# Patient Record
Sex: FEMALE | Race: WHITE | NOT HISPANIC OR LATINO | Employment: UNEMPLOYED | URBAN - METROPOLITAN AREA
[De-identification: names, ages, dates, MRNs, and addresses within clinical notes are randomized per-mention and may not be internally consistent; named-entity substitution may affect disease eponyms.]

---

## 2017-04-11 DIAGNOSIS — Z01.812 PRE-OPERATIVE LABORATORY EXAMINATION: ICD-10-CM

## 2017-04-11 LAB
ANION GAP SERPL CALC-SCNC: 9 MMOL/L (ref 0–11.9)
BASOPHILS # BLD AUTO: 0.4 % (ref 0–1.8)
BASOPHILS # BLD: 0.03 K/UL (ref 0–0.12)
BUN SERPL-MCNC: 14 MG/DL (ref 8–22)
CALCIUM SERPL-MCNC: 9.9 MG/DL (ref 8.5–10.5)
CHLORIDE SERPL-SCNC: 103 MMOL/L (ref 96–112)
CO2 SERPL-SCNC: 24 MMOL/L (ref 20–33)
CREAT SERPL-MCNC: 0.6 MG/DL (ref 0.5–1.4)
EOSINOPHIL # BLD AUTO: 0.1 K/UL (ref 0–0.51)
EOSINOPHIL NFR BLD: 1.2 % (ref 0–6.9)
ERYTHROCYTE [DISTWIDTH] IN BLOOD BY AUTOMATED COUNT: 39 FL (ref 35.9–50)
GFR SERPL CREATININE-BSD FRML MDRD: >60 ML/MIN/1.73 M 2
GLUCOSE SERPL-MCNC: 91 MG/DL (ref 65–99)
HCG SERPL QL: NEGATIVE
HCT VFR BLD AUTO: 45.3 % (ref 37–47)
HGB BLD-MCNC: 15.5 G/DL (ref 12–16)
IMM GRANULOCYTES # BLD AUTO: 0.02 K/UL (ref 0–0.11)
IMM GRANULOCYTES NFR BLD AUTO: 0.2 % (ref 0–0.9)
LYMPHOCYTES # BLD AUTO: 1.9 K/UL (ref 1–4.8)
LYMPHOCYTES NFR BLD: 22.9 % (ref 22–41)
MCH RBC QN AUTO: 29 PG (ref 27–33)
MCHC RBC AUTO-ENTMCNC: 34.2 G/DL (ref 33.6–35)
MCV RBC AUTO: 84.7 FL (ref 81.4–97.8)
MONOCYTES # BLD AUTO: 0.42 K/UL (ref 0–0.85)
MONOCYTES NFR BLD AUTO: 5.1 % (ref 0–13.4)
NEUTROPHILS # BLD AUTO: 5.82 K/UL (ref 2–7.15)
NEUTROPHILS NFR BLD: 70.2 % (ref 44–72)
NRBC # BLD AUTO: 0 K/UL
NRBC BLD AUTO-RTO: 0 /100 WBC
PLATELET # BLD AUTO: 284 K/UL (ref 164–446)
PMV BLD AUTO: 10.4 FL (ref 9–12.9)
POTASSIUM SERPL-SCNC: 3.9 MMOL/L (ref 3.6–5.5)
RBC # BLD AUTO: 5.35 M/UL (ref 4.2–5.4)
SODIUM SERPL-SCNC: 136 MMOL/L (ref 135–145)
WBC # BLD AUTO: 8.3 K/UL (ref 4.8–10.8)

## 2017-04-11 PROCEDURE — 80048 BASIC METABOLIC PNL TOTAL CA: CPT

## 2017-04-11 PROCEDURE — 36415 COLL VENOUS BLD VENIPUNCTURE: CPT

## 2017-04-11 PROCEDURE — 84703 CHORIONIC GONADOTROPIN ASSAY: CPT

## 2017-04-11 PROCEDURE — 85025 COMPLETE CBC W/AUTO DIFF WBC: CPT

## 2017-04-11 RX ORDER — IBUPROFEN 200 MG
600 TABLET ORAL EVERY 6 HOURS PRN
COMMUNITY
End: 2018-12-21

## 2017-04-11 RX ORDER — ACETAMINOPHEN 500 MG
1500 TABLET ORAL 3 TIMES DAILY PRN
COMMUNITY
End: 2018-12-21
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
## 2017-04-21 ENCOUNTER — HOSPITAL ENCOUNTER (OUTPATIENT)
Facility: MEDICAL CENTER | Age: 27
End: 2017-04-22
Attending: OBSTETRICS & GYNECOLOGY | Admitting: OBSTETRICS & GYNECOLOGY
Payer: MEDICAID

## 2017-04-21 PROBLEM — R10.2 ADNEXAL TENDERNESS, RIGHT: Status: ACTIVE | Noted: 2017-04-21

## 2017-04-21 LAB — HCG SERPL QL: NEGATIVE

## 2017-04-21 PROCEDURE — 501838 HCHG SUTURE GENERAL: Performed by: OBSTETRICS & GYNECOLOGY

## 2017-04-21 PROCEDURE — G0378 HOSPITAL OBSERVATION PER HR: HCPCS

## 2017-04-21 PROCEDURE — 700101 HCHG RX REV CODE 250

## 2017-04-21 PROCEDURE — 500888 HCHG PACK, MINOR BASIN: Performed by: OBSTETRICS & GYNECOLOGY

## 2017-04-21 PROCEDURE — 500854 HCHG NEEDLE, INSUFFLATION FOR STEP: Performed by: OBSTETRICS & GYNECOLOGY

## 2017-04-21 PROCEDURE — 502704 HCHG DEVICE, LIGASURE IMPACT: Performed by: OBSTETRICS & GYNECOLOGY

## 2017-04-21 PROCEDURE — 501579 HCHG TROCAR, STEP 5MM: Performed by: OBSTETRICS & GYNECOLOGY

## 2017-04-21 PROCEDURE — 700111 HCHG RX REV CODE 636 W/ 250 OVERRIDE (IP): Performed by: OBSTETRICS & GYNECOLOGY

## 2017-04-21 PROCEDURE — 160009 HCHG ANES TIME/MIN: Performed by: OBSTETRICS & GYNECOLOGY

## 2017-04-21 PROCEDURE — 110382 HCHG SHELL REV 271: Performed by: OBSTETRICS & GYNECOLOGY

## 2017-04-21 PROCEDURE — 160002 HCHG RECOVERY MINUTES (STAT): Performed by: OBSTETRICS & GYNECOLOGY

## 2017-04-21 PROCEDURE — 502647 HCHG SEALANT-FIBRIN EVICEL 5 ML: Performed by: OBSTETRICS & GYNECOLOGY

## 2017-04-21 PROCEDURE — 160029 HCHG SURGERY MINUTES - 1ST 30 MINS LEVEL 4: Performed by: OBSTETRICS & GYNECOLOGY

## 2017-04-21 PROCEDURE — 501330 HCHG SET, CYSTO IRRIG TUBING: Performed by: OBSTETRICS & GYNECOLOGY

## 2017-04-21 PROCEDURE — 160036 HCHG PACU - EA ADDL 30 MINS PHASE I: Performed by: OBSTETRICS & GYNECOLOGY

## 2017-04-21 PROCEDURE — 700102 HCHG RX REV CODE 250 W/ 637 OVERRIDE(OP)

## 2017-04-21 PROCEDURE — 160035 HCHG PACU - 1ST 60 MINS PHASE I: Performed by: OBSTETRICS & GYNECOLOGY

## 2017-04-21 PROCEDURE — 700102 HCHG RX REV CODE 250 W/ 637 OVERRIDE(OP): Performed by: OBSTETRICS & GYNECOLOGY

## 2017-04-21 PROCEDURE — 502648 HCHG APPLICATOR, EVICEL: Performed by: OBSTETRICS & GYNECOLOGY

## 2017-04-21 PROCEDURE — 502240 HCHG MISC OR SUPPLY RC 0272: Performed by: OBSTETRICS & GYNECOLOGY

## 2017-04-21 PROCEDURE — A4338 INDWELLING CATHETER LATEX: HCPCS | Performed by: OBSTETRICS & GYNECOLOGY

## 2017-04-21 PROCEDURE — 84703 CHORIONIC GONADOTROPIN ASSAY: CPT

## 2017-04-21 PROCEDURE — 700111 HCHG RX REV CODE 636 W/ 250 OVERRIDE (IP)

## 2017-04-21 PROCEDURE — 160048 HCHG OR STATISTICAL LEVEL 1-5: Performed by: OBSTETRICS & GYNECOLOGY

## 2017-04-21 PROCEDURE — 501688 HCHG UTERINE MANIPULATOR RUMI: Performed by: OBSTETRICS & GYNECOLOGY

## 2017-04-21 PROCEDURE — 88307 TISSUE EXAM BY PATHOLOGIST: CPT

## 2017-04-21 PROCEDURE — 500886 HCHG PACK, LAPAROSCOPY: Performed by: OBSTETRICS & GYNECOLOGY

## 2017-04-21 PROCEDURE — 160041 HCHG SURGERY MINUTES - EA ADDL 1 MIN LEVEL 4: Performed by: OBSTETRICS & GYNECOLOGY

## 2017-04-21 PROCEDURE — 501577 HCHG TROCAR, STEP 11MM: Performed by: OBSTETRICS & GYNECOLOGY

## 2017-04-21 PROCEDURE — A4606 OXYGEN PROBE USED W OXIMETER: HCPCS | Performed by: OBSTETRICS & GYNECOLOGY

## 2017-04-21 PROCEDURE — 501411 HCHG SPONGE, BABY LAP W/O RINGS: Performed by: OBSTETRICS & GYNECOLOGY

## 2017-04-21 PROCEDURE — A9270 NON-COVERED ITEM OR SERVICE: HCPCS

## 2017-04-21 PROCEDURE — A9270 NON-COVERED ITEM OR SERVICE: HCPCS | Performed by: OBSTETRICS & GYNECOLOGY

## 2017-04-21 PROCEDURE — 110371 HCHG SHELL REV 272: Performed by: OBSTETRICS & GYNECOLOGY

## 2017-04-21 RX ORDER — ONDANSETRON 2 MG/ML
INJECTION INTRAMUSCULAR; INTRAVENOUS
Status: COMPLETED
Start: 2017-04-21 | End: 2017-04-21

## 2017-04-21 RX ORDER — SIMETHICONE 80 MG
80 TABLET,CHEWABLE ORAL EVERY 8 HOURS PRN
Status: DISCONTINUED | OUTPATIENT
Start: 2017-04-21 | End: 2017-04-22 | Stop reason: HOSPADM

## 2017-04-21 RX ORDER — MIDAZOLAM HYDROCHLORIDE 1 MG/ML
INJECTION INTRAMUSCULAR; INTRAVENOUS
Status: COMPLETED
Start: 2017-04-21 | End: 2017-04-21

## 2017-04-21 RX ORDER — OXYCODONE HYDROCHLORIDE 5 MG/1
2.5 TABLET ORAL
Status: DISCONTINUED | OUTPATIENT
Start: 2017-04-21 | End: 2017-04-22 | Stop reason: HOSPADM

## 2017-04-21 RX ORDER — MORPHINE SULFATE 4 MG/ML
2 INJECTION, SOLUTION INTRAMUSCULAR; INTRAVENOUS
Status: DISCONTINUED | OUTPATIENT
Start: 2017-04-21 | End: 2017-04-22 | Stop reason: HOSPADM

## 2017-04-21 RX ORDER — ACETAMINOPHEN 650 MG/1
975 SUPPOSITORY RECTAL EVERY 6 HOURS
Status: DISCONTINUED | OUTPATIENT
Start: 2017-04-21 | End: 2017-04-21

## 2017-04-21 RX ORDER — BUPIVACAINE HYDROCHLORIDE AND EPINEPHRINE 2.5; 5 MG/ML; UG/ML
INJECTION, SOLUTION EPIDURAL; INFILTRATION; INTRACAUDAL; PERINEURAL
Status: DISCONTINUED | OUTPATIENT
Start: 2017-04-21 | End: 2017-04-21 | Stop reason: HOSPADM

## 2017-04-21 RX ORDER — OXYCODONE HYDROCHLORIDE 5 MG/1
5 TABLET ORAL
Status: DISCONTINUED | OUTPATIENT
Start: 2017-04-21 | End: 2017-04-22 | Stop reason: HOSPADM

## 2017-04-21 RX ORDER — LIDOCAINE HYDROCHLORIDE 10 MG/ML
INJECTION, SOLUTION INFILTRATION; PERINEURAL
Status: DISPENSED
Start: 2017-04-21 | End: 2017-04-21

## 2017-04-21 RX ORDER — SODIUM CHLORIDE, SODIUM LACTATE, POTASSIUM CHLORIDE, CALCIUM CHLORIDE 600; 310; 30; 20 MG/100ML; MG/100ML; MG/100ML; MG/100ML
1000 INJECTION, SOLUTION INTRAVENOUS ONCE
Status: COMPLETED | OUTPATIENT
Start: 2017-04-21 | End: 2017-04-21

## 2017-04-21 RX ORDER — ONDANSETRON 2 MG/ML
4 INJECTION INTRAMUSCULAR; INTRAVENOUS EVERY 6 HOURS PRN
Status: DISCONTINUED | OUTPATIENT
Start: 2017-04-21 | End: 2017-04-22 | Stop reason: HOSPADM

## 2017-04-21 RX ORDER — OXYCODONE HCL 5 MG/5 ML
SOLUTION, ORAL ORAL
Status: COMPLETED
Start: 2017-04-21 | End: 2017-04-21

## 2017-04-21 RX ORDER — BUPIVACAINE HYDROCHLORIDE AND EPINEPHRINE 2.5; 5 MG/ML; UG/ML
INJECTION, SOLUTION EPIDURAL; INFILTRATION; INTRACAUDAL; PERINEURAL
Status: DISPENSED
Start: 2017-04-21 | End: 2017-04-21

## 2017-04-21 RX ORDER — OXYCODONE AND ACETAMINOPHEN 7.5; 325 MG/1; MG/1
1 TABLET ORAL EVERY 4 HOURS PRN
Status: DISCONTINUED | OUTPATIENT
Start: 2017-04-21 | End: 2017-04-22 | Stop reason: HOSPADM

## 2017-04-21 RX ADMIN — FENTANYL CITRATE 25 MCG: 50 INJECTION, SOLUTION INTRAMUSCULAR; INTRAVENOUS at 14:20

## 2017-04-21 RX ADMIN — MIDAZOLAM 1 MG: 1 INJECTION INTRAMUSCULAR; INTRAVENOUS at 13:00

## 2017-04-21 RX ADMIN — ONDANSETRON 4 MG: 2 INJECTION, SOLUTION INTRAMUSCULAR; INTRAVENOUS at 21:18

## 2017-04-21 RX ADMIN — FENTANYL CITRATE 25 MCG: 50 INJECTION, SOLUTION INTRAMUSCULAR; INTRAVENOUS at 13:31

## 2017-04-21 RX ADMIN — SODIUM CHLORIDE, SODIUM LACTATE, POTASSIUM CHLORIDE, CALCIUM CHLORIDE 1000 ML: 600; 310; 30; 20 INJECTION, SOLUTION INTRAVENOUS at 09:15

## 2017-04-21 RX ADMIN — OXYCODONE HYDROCHLORIDE 10 MG: 5 SOLUTION ORAL at 13:32

## 2017-04-21 RX ADMIN — MORPHINE SULFATE 2 MG: 4 INJECTION INTRAVENOUS at 21:17

## 2017-04-21 RX ADMIN — FENTANYL CITRATE 50 MCG: 50 INJECTION, SOLUTION INTRAMUSCULAR; INTRAVENOUS at 14:50

## 2017-04-21 RX ADMIN — FENTANYL CITRATE 25 MCG: 50 INJECTION, SOLUTION INTRAMUSCULAR; INTRAVENOUS at 15:48

## 2017-04-21 RX ADMIN — ONDANSETRON 4 MG: 2 INJECTION, SOLUTION INTRAMUSCULAR; INTRAVENOUS at 12:50

## 2017-04-21 RX ADMIN — MORPHINE SULFATE 2 MG: 4 INJECTION INTRAVENOUS at 17:01

## 2017-04-21 RX ADMIN — MIDAZOLAM 1 MG: 1 INJECTION INTRAMUSCULAR; INTRAVENOUS at 14:50

## 2017-04-21 ASSESSMENT — PAIN SCALES - GENERAL
PAINLEVEL_OUTOF10: 8
PAINLEVEL_OUTOF10: 6
PAINLEVEL_OUTOF10: 7
PAINLEVEL_OUTOF10: 0
PAINLEVEL_OUTOF10: 6
PAINLEVEL_OUTOF10: 8
PAINLEVEL_OUTOF10: 7
PAINLEVEL_OUTOF10: 5

## 2017-04-21 ASSESSMENT — LIFESTYLE VARIABLES
ALCOHOL_USE: NO
EVER_SMOKED: NEVER
DO YOU DRINK ALCOHOL: NO

## 2017-04-21 NOTE — OR NURSING
1310 received report from Vahid WOLF, plan of care discussed. Pt sleeping, VS stable  1331 pt stating abdomen and throat hurting. Pt medicated for pain - see MAR   1350  at bedside.  1400 handoff to Vahid WOLF

## 2017-04-21 NOTE — PROGRESS NOTES
1400-report from Stefany WOLF, reassumed care of pt.  Pt drowsy, awakens to voice, rates pain 7/10, back to sleep.  1420-pt rates pain 8/10, medicated with fentanyl IV.   1425-pt nauseated 2nd time.  Emesis bag within reach. HOB elevated.  Dr Larry updated regarding pts status, admission order received.  1450-pt moaning, restless, anxious, states she has pain. Medicated with fentanyl IV, midazolam IV  1520-pt sleeping soundly with  at side, no distress noted.  1540-room assignment received, report to Lyn WOLF.     1601-transport at side to take pt to room. , belongings up to room with pt

## 2017-04-21 NOTE — PROGRESS NOTES
1221-received pt from OR with report from Dr Valentin. VSS. Oral airway in place, respirations unlabored.  Abdomen flat, soft. 2 lap incisions noted, covered with bandaids. CDI. Saab catheter in place, draining bright yellow urine.  1240-pt opening eyes, airway dc'd, pt nauseated, vomited approx 50 cc.  HOB elevated. Pt orally suctioned. Medicated with zofran IV.   1300-pt awake, restless, c/o 'have to pee,' reassured she has catheter, does not need to pee. medicated with versed IV.  1310-pt sleeping handoff to emily WOLF

## 2017-04-22 VITALS
HEIGHT: 64 IN | DIASTOLIC BLOOD PRESSURE: 78 MMHG | RESPIRATION RATE: 16 BRPM | SYSTOLIC BLOOD PRESSURE: 114 MMHG | WEIGHT: 156.53 LBS | HEART RATE: 120 BPM | TEMPERATURE: 97.3 F | BODY MASS INDEX: 26.72 KG/M2 | OXYGEN SATURATION: 98 %

## 2017-04-22 PROCEDURE — 700111 HCHG RX REV CODE 636 W/ 250 OVERRIDE (IP): Performed by: OBSTETRICS & GYNECOLOGY

## 2017-04-22 PROCEDURE — 700102 HCHG RX REV CODE 250 W/ 637 OVERRIDE(OP): Performed by: OBSTETRICS & GYNECOLOGY

## 2017-04-22 PROCEDURE — G0378 HOSPITAL OBSERVATION PER HR: HCPCS

## 2017-04-22 PROCEDURE — 700111 HCHG RX REV CODE 636 W/ 250 OVERRIDE (IP)

## 2017-04-22 PROCEDURE — A9270 NON-COVERED ITEM OR SERVICE: HCPCS | Performed by: OBSTETRICS & GYNECOLOGY

## 2017-04-22 RX ORDER — SODIUM CHLORIDE, SODIUM LACTATE, POTASSIUM CHLORIDE, CALCIUM CHLORIDE 600; 310; 30; 20 MG/100ML; MG/100ML; MG/100ML; MG/100ML
INJECTION, SOLUTION INTRAVENOUS
Status: COMPLETED
Start: 2017-04-22 | End: 2017-04-22

## 2017-04-22 RX ORDER — ONDANSETRON 4 MG/1
TABLET, ORALLY DISINTEGRATING ORAL
Status: COMPLETED
Start: 2017-04-22 | End: 2017-04-22

## 2017-04-22 RX ADMIN — ONDANSETRON 4 MG: 4 TABLET, ORALLY DISINTEGRATING ORAL at 12:21

## 2017-04-22 RX ADMIN — OXYCODONE AND ACETAMINOPHEN 1 TABLET: 7.5; 325 TABLET ORAL at 08:46

## 2017-04-22 RX ADMIN — OXYCODONE HYDROCHLORIDE 5 MG: 5 TABLET ORAL at 12:16

## 2017-04-22 RX ADMIN — OXYCODONE AND ACETAMINOPHEN 1 TABLET: 7.5; 325 TABLET ORAL at 04:37

## 2017-04-22 RX ADMIN — SODIUM CHLORIDE, POTASSIUM CHLORIDE, SODIUM LACTATE AND CALCIUM CHLORIDE 1000 ML: 600; 310; 30; 20 INJECTION, SOLUTION INTRAVENOUS at 00:33

## 2017-04-22 RX ADMIN — MORPHINE SULFATE 2 MG: 4 INJECTION INTRAVENOUS at 01:29

## 2017-04-22 ASSESSMENT — LIFESTYLE VARIABLES: EVER_SMOKED: NEVER

## 2017-04-22 ASSESSMENT — PAIN SCALES - GENERAL
PAINLEVEL_OUTOF10: 3
PAINLEVEL_OUTOF10: 2
PAINLEVEL_OUTOF10: 4
PAINLEVEL_OUTOF10: 8

## 2017-04-22 NOTE — PROGRESS NOTES
Called MD Laron Dennis to clarify order of tylenol suppository 975 mg every 6 hours . MD ordered to discontinue Tylenol suppository 975 mg every 6 hours. MD ordered percocet 7.5/325 mg 1 tab every 4-5 hours prn for moderate pain. MD ordered to take pt's  I/O every 4 hours.

## 2017-04-22 NOTE — PROGRESS NOTES
Patient resting quietly in bed, no S/S of distress, AA&Ox4. Abdominal dressing CDI, IV fluids running, 2L O2 NC, medicated for pain per MAR. Denies SOB, chest pain, dizziness. Call light within reach,  pt calls appropriately and does not get out of bed. Bed in lowest position, bed locked, RN and CNA numbers provided, no further needs at this time. No changes from EPIC. Hourly rounding in place.

## 2017-04-22 NOTE — OP REPORT
DATE OF SERVICE:  04/21/2017    PREOPERATIVE DIAGNOSIS:  Chronic pelvic pain.    POSTOPERATIVE DIAGNOSES:  Chronic pelvic pain, adenomyosis, and fibroid   uterus.    OPERATION:  Laparoscopically assisted vaginal hysterectomy, bilateral   salpingectomy with preservation of the ovaries, cystoscopy.    SURGEON:  Sonny Larry MD.    ASSISTANT:  ySbil Lira MD.    ANESTHESIA:  General endotracheal.    ANESTHESIOLOGIST:  Bong Valentin MD.    ESTIMATED BLOOD LOSS:  200 mL.    DRAINS:  Saab to gravity.    ANTIBIOTICS:  Ancef 2 g IV piggyback.    PROPHYLAXIS:  Sequential stockings in place.    FINDINGS:  Exam under anesthesia revealed the uterus to be approximately 8   weeks in size, somewhat boggy with anterior fibroids.  Diagnostic laparoscopy   revealed anterior fibroids.  The right upper quadrant was without gross   pathology.  The appendix was normal.  Cystoscopy revealed bilateral jetting of   fluorescein from the right ureter and the left ureter.  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    DESCRIPTION OF PROCEDURE:  Patient was taken to the operating room and placed   on the operating room table where general endotracheal anesthesia was   administered.  Patient was placed in modified dorsal lithotomy position,   prepped and draped in the usual fashion.  Exam under anesthesia was performed.    A Saab catheter had been placed.  A time-out was called and the patient,   the operation, allergies and her birthdate were identified and confirmed.    Following the time-out, a weighted speculum was placed in the vagina.  The   anterior lip of the cervix was grasped with a single tooth tenaculum and a   Hulka uterine manipulator was placed.  The single tooth tenaculum was removed   and the weighted speculum was removed.  The legs were lowered.  The   subumbilical region was injected.  The umbilicus was extremely dirty and had   been cleansed by the circulating nurse.   Below the umbilicus 0.25% Marcaine   with epinephrine 8 mL was instilled and a semilunar incision was made.    Through the incision, a Veress step needle was inserted into the peritoneal   cavity.  The drop test was positive.  The peritoneum was insufflated with 3   liters of CO2.  The step needle was removed and a #11 step trocar was   introduced.  5 cm above the symphysis pubis in the midline, 5 mL of 0.25%   Marcaine was instilled.  A 5 mm transverse incision was made.  Through this   incision, a step needle was introduced.  The needle was removed and the 5 mm   step trocar was introduced and a Prestige instrument was delivered through the   5 mm trocar.  The right tube and ovary were grasped and elevated and the   right ureter was identified.  The patient had a tubal ligation and the distal   fallopian tube was excised.  The mesosalpinx was cauterized and the right   fallopian tube was delivered through the operating scope.  Attention was   focused on the left fallopian tube.  The mesosalpinx was identified.  The left   ureter was identified.  The left mesosalpinx was then cauterized and the   distal fallopian tube was removed.  The right uterine ovarian ligament was   cauterized and lysed.  The right round ligament was cauterized and lysed.    Hugging the uterus, tissue was cauterized and lysed to the level below the   right uterine vessels.  The same procedure was carried out on the left side.    The left uterine ovarian ligament was cauterized and lysed.  The left round   ligament was cauterized and lysed; hugging the tissue at the corpus tissue was   cauterized and lysed to below the left uterine vessels.  Using sharp   scissors, a bladder flap was dissected.  Separate bleeders were coagulated   with the LigaSure cautery instrument.  The bladder was advanced using sharp   and blunt dissection.  After advancing the bladder, all instruments were   removed and draped across the abdomen with a sterile towel.   The legs were   elevated.  Weighted speculum was placed in the vagina and the manipulator was   removed and the anterior and posterior lips of the cervix were grasped with   Daniel tenaculums.  The cervix was injected with 0.25% Marcaine in a   paracervical block, total of 10 mL.  Using the Bard Nasim #10 blade, the   cervix was circumscribed.  The vagina was advanced using sharp and blunt   dissection.  The posterior cul-de-sac was entered using sharp dissection.  A   long weighted Polo Auvard weighted speculum was placed.  The left   uterosacral ligament was grasped, divided and ligated with 0 Vicryl.  This   suture was kept long.  The right uterosacral ligament was grasped, divided and   ligated with 0 Vicryl.  This suture was kept long.  The left cardinal   ligament was grasped, divided and ligated.  This suture was cut short.  The   same procedure was carried out on the right side.  The left cardinal   uterosacral ligament remaining tissue was grasped, divided and ligated with 0   Vicryl.  The cervix was then circumscribed and excised.  The uterus was   morcellated, the inner corpus _____ decreasing the volume of the uterus.  A   curved Nicki clamp was placed across the remaining tissue on the left side,   this tissue was divided and ligated with 0 Vicryl.  The same procedure was   carried out on the right side.  The right side was delivered in the operative   field.  A curved Nicki clamp was placed across the remaining tissue on the   left side, which was divided and doubly ligated.  There was good hemostasis.    Moist lap tapes x2 were placed in the peritoneal cavity.  They were removed.    The pubocervical tissue was then approximated with a suture of 0 Vicryl   running right to left.  The vagina was closed with 0 Vicryl running left to   right.  The uterosacral sutures were cut.  A sponge stick was placed in the   vagina.  Diagnostic laparoscopy was performed.  There was bleeding from the   cuff, which  was cauterized using the LigaSure instrument.  Evicel was then   placed.  There was good hemostasis.  Under direct vision, the 5 mm trocar was   removed as was the 12 mm trocar after CO2 had been allowed to escape from the   peritoneal cavity.  The rectus fascia was approximated in the midline with 0   Vicryl.  The subumbilical skin incision was closed with 4-0 Vicryl.  The   suprapubic incision was closed with 4-0 Vicryl.  Attention was then focused on   the cystoscopy.  Fluoroscein had been given by Dr. Valentin.  Fluoroscein was   seen to eject from the left ureteral orifice and the right ureteral orifice.    The Saab, which had been removed, was replaced.  The needle and pack count   were correct.  The patient was taken to the recovery room in satisfactory   condition.  Prior to the operation in my private office, we had discussed the   risks, benefits and alternatives to surgery.  We discussed infection,   bleeding, injury to the bowel, bladder, ureter, or pelvic vessels.  Informed   consent had been received.  All the patient's questions had been answered   prior to the surgery.  The patient had stated that she wished to preserve her   ovaries, but to remove her fallopian tubes.  This was performed.  All the   patient's questions had been answered to her satisfaction.  Informed consent   form had been read, discussed and signed in my private office.       ____________________________________     MD KATE GROVE / MAGGIE    DD:  04/21/2017 12:24:37  DT:  04/21/2017 15:05:48    D#:  [id number]  Job#:  [id number]    cc: MEHRAN MCGILL MD

## 2017-04-22 NOTE — PROGRESS NOTES
Pt wanting to go home now, apparently Dr Larry not able to discharge Pt properly, needing for Md to co sign PACU orders.  Placed call to Dr Larry and no words yet.  Awaiting to finalized discharge.

## 2017-04-22 NOTE — PROGRESS NOTES
Corine Baugh, 26 yrs, 1990  MRN: [id number]      Follow up Information  1. Follow up with Sonny Larry MD in 2 weeks  Specialty: Gynecology  Contact info   [address] [facility].   [place] [phone]    Safekeeping Location:    Safekeeping Number:                                                  Discharge Instructions    Discharged to home by car with relative. Discharged via wheelchair, hospital escort: Yes.  Special equipment needed: Not Applicable    Be sure to schedule a follow-up appointment with your primary care doctor or any specialists as instructed.     Discharge Plan:   Diet Plan: Discussed  Activity Level: Discussed  Confirmed Follow up Appointment: Patient to Call and Schedule Appointment  Confirmed Symptoms Management: Discussed  Medication Reconciliation Updated: No (Comments)  Influenza Vaccine Indication: Patient Refuses    I understand that a diet low in cholesterol, fat, and sodium is recommended for good health. Unless I have been given specific instructions below for another diet, I accept this instruction as my diet prescription.   Other diet: Regular diet    Special Instructions: None    · Is patient discharged on Warfarin / Coumadin?   No     · Is patient Post Blood Transfusion?  No    Depression / Suicide Risk    As you are discharged from this [facility] facility, it is important to learn how to keep safe from harming yourself.    Recognize the warning signs:  · Abrupt changes in personality, positive or negative- including increase in energy   · Giving away possessions  · Change in eating patterns- significant weight changes-  positive or negative  · Change in sleeping patterns- unable to sleep or sleeping all the time   · Unwillingness or inability to communicate  · Depression  · Unusual sadness, discouragement and loneliness  · Talk of wanting to die  · Neglect of personal appearance   · Rebelliousness- reckless behavior  · Withdrawal from people/activities they  love  · Confusion- inability to concentrate     If you or a loved one observes any of these behaviors or has concerns about self-harm, here's what you can do:  · Talk about it- your feelings and reasons for harming yourself  · Remove any means that you might use to hurt yourself (examples: pills, rope, extension cords, firearm)  · Get professional help from the community (Mental Health, Substance Abuse, psychological counseling)  · Do not be alone:Call your Safe Contact- someone whom you trust who will be there for you.  · Call your local CRISIS HOTLINE 995-4404 or 671-890-3588  · Call your local Children's Mobile Crisis Response Team [place] (585) 351-2649 or www.[employer]  · Call the toll free National Suicide Prevention Hotlines   · National Suicide Prevention Lifeline 175-523-SKEU (2124)  · National Hope Line Network 800-SUICIDE (152-8817)

## 2017-04-22 NOTE — PROGRESS NOTES
"Dr Larry called back and I informed him not signing the order he did prior to surgery.  Dr Larry stated \" I will not be able to come back there not until Monday\" and reminded him to what document he needs to sign. Will leave message to his office to remind him to sign and discharge order for the Patient.  Able to print out the discharge Instruction through Progress note  Given Home Instructions and advises Pt to call Md's office if she has questions.  Home with all belongings accompanied by her family in stable condition.  "

## 2017-04-22 NOTE — PROGRESS NOTES
Activity Rest and take it easy for the first 24 hrs. A responsible adult is recommended to remain with you during that time. It is normal to feel sleepy. We encouraged you to not to do anything that requires balance, judgement or coordination.    MILD FLU LIKE SYMPTOMS ARE NORMAL. YOU MAY EXPERIENCE GENERALIZED MUSCLE ACHES,THROAT IRRITATIONS, HEADACHE AND /OR SOME NAUSEA.  FOR 24 HOURS DO NOT:      Drive operate machinery or run household appliances.       Drink beer or alcoholic beverages.        Make important decisions or sign legal documents  Diet: To avoid Nausea, may slowly advance diet as tolerated, avoid spicy or greasy foods.    May increase fluids and fibers to avoid constipation  Surgical Dressings/Bathing: Showers only until approved.    FOLLOW UP APPOINTMENT IN 2 WEEKS  NOTHING IN YOUR VAGINA FOR 6 WEEKS  CALL MD WHEN WITH TEMP GREATER THAN 101F  PAIN NOT RELEIVED BY MEDICATIONS. OR PERSISTENT NAUSEA OR VOMITING   CALL 911 IF YOU DEVELOP PROBLEMS WITH BREATHING OR CHEST PAIN  IF UNABLE TO CONTACT YOUR DOCTOR OR SURGICAL CENTER YOU SHOULD GO TO THE NEAREST EMERGENCY ROOM OR URGENT CLINIC.

## 2017-04-22 NOTE — DISCHARGE INSTRUCTIONS
ACTIVITY: Rest and take it easy for the first 24 hours.  A responsible adult is recommended to remain with you during that time.  It is normal to feel sleepy.  We encourage you to not do anything that requires balance, judgment or coordination.    MILD FLU-LIKE SYMPTOMS ARE NORMAL. YOU MAY EXPERIENCE GENERALIZED MUSCLE ACHES, THROAT IRRITATION, HEADACHE AND/OR SOME NAUSEA.    FOR 24 HOURS DO NOT:  Drive, operate machinery or run household appliances.  Drink beer or alcoholic beverages.   Make important decisions or sign legal documents.    SPECIAL INSTRUCTIONS: SEE HANDOUT    DIET: To avoid nausea, slowly advance diet as tolerated, avoiding spicy or greasy foods for the first day.  Add more substantial food to your diet according to your physician's instructions.  Babies can be fed formula or breast milk as soon as they are hungry.  INCREASE FLUIDS AND FIBER TO AVOID CONSTIPATION.    SURGICAL DRESSING/BATHING: SHOWERS ONLY UNTIL MD APPROVES    FOLLOW-UP APPOINTMENT:  A follow-up appointment should be arranged with your doctor in 2 weeks; call to schedule.    You should CALL YOUR PHYSICIAN if you develop:  Fever greater than 101 degrees F.  Pain not relieved by medication, or persistent nausea or vomiting.  Excessive bleeding (blood soaking through dressing) or unexpected drainage from the wound.  Extreme redness or swelling around the incision site, drainage of pus or foul smelling drainage.  Inability to urinate or empty your bladder within 8 hours.  Problems with breathing or chest pain.    You should call 911 if you develop problems with breathing or chest pain.  If you are unable to contact your doctor or surgical center, you should go to the nearest emergency room or urgent care center.  Physician's telephone #: [phone]    If any questions arise, call your doctor.  If your doctor is not available, please feel free to call the Surgical Center at [phone].  The Center is open Monday through Friday from  7AM to 7PM.  You can also call the HEALTH HOTLINE open 24 hours/day, 7 days/week and speak to a nurse at [phone], or toll free at [phone].    A registered nurse may call you a few days after your surgery to see how you are doing after your procedure.    MEDICATIONS: Resume taking daily medication.  Take prescribed pain medication with food.  If no medication is prescribed, you may take non-aspirin pain medication if needed.  PAIN MEDICATION CAN BE VERY CONSTIPATING.  Take a stool softener or laxative such as senokot, pericolace, or milk of magnesia if needed.    Prescription given for NORCO.  Last pain medication given at 0820.    Your physician has prescribed pain medication that includes Acetaminophen (Tylenol), do not take additional Acetaminophen (Tylenol) while taking the prescribed medication.    Depression / Suicide Risk    As you are discharged from this [facility] facility, it is important to learn how to keep safe from harming yourself.    Recognize the warning signs:  · Abrupt changes in personality, positive or negative- including increase in energy   · Giving away possessions  · Change in eating patterns- significant weight changes-  positive or negative  · Change in sleeping patterns- unable to sleep or sleeping all the time   · Unwillingness or inability to communicate  · Depression  · Unusual sadness, discouragement and loneliness  · Talk of wanting to die  · Neglect of personal appearance   · Rebelliousness- reckless behavior  · Withdrawal from people/activities they love  · Confusion- inability to concentrate     If you or a loved one observes any of these behaviors or has concerns about self-harm, here's what you can do:  · Talk about it- your feelings and reasons for harming yourself  · Remove any means that you might use to hurt yourself (examples: pills, rope, extension cords, firearm)  · Get professional help from the community (Mental Health, Substance Abuse, psychological  counseling)  · Do not be alone:Call your Safe Contact- someone whom you trust who will be there for you.  · Call your local CRISIS HOTLINE 178-2338 or 177-444-9328  · Call your local Children's Mobile Crisis Response Team [place] (317) 386-6695 or www.[employer]  · Call the toll free National Suicide Prevention Hotlines   · National Suicide Prevention Lifeline 879-396-WVUV (5920)  National Hope Line Network 800-SUICIDE (433-5034)Discharge Instructions    Discharged to home by car with relative. Discharged via wheelchair, hospital escort: Yes.  Special equipment needed: Not Applicable    Be sure to schedule a follow-up appointment with your primary care doctor or any specialists as instructed.     Discharge Plan:   Diet Plan: Discussed  Activity Level: Discussed  Confirmed Follow up Appointment: Patient to Call and Schedule Appointment  Confirmed Symptoms Management: Discussed  Medication Reconciliation Updated: No (Comments)  Influenza Vaccine Indication: Patient Refuses    I understand that a diet low in cholesterol, fat, and sodium is recommended for good health. Unless I have been given specific instructions below for another diet, I accept this instruction as my diet prescription.   Other diet: regular    Special Instructions: None    Is patient discharged on Warfarin / Coumadin?   No     Is patient Post Blood Transfusion?  No    Depression / Suicide Risk    As you are discharged from this [facility] facility, it is important to learn how to keep safe from harming yourself.    Recognize the warning signs:  Abrupt changes in personality, positive or negative- including increase in energy   Giving away possessions  Change in eating patterns- significant weight changes-  positive or negative  Change in sleeping patterns- unable to sleep or sleeping all the time   Unwillingness or inability to communicate  Depression  Unusual sadness, discouragement and loneliness  Talk of wanting to die  Neglect of  personal appearance   Rebelliousness- reckless behavior  Withdrawal from people/activities they love  Confusion- inability to concentrate     If you or a loved one observes any of these behaviors or has concerns about self-harm, here's what you can do:  Talk about it- your feelings and reasons for harming yourself  Remove any means that you might use to hurt yourself (examples: pills, rope, extension cords, firearm)  Get professional help from the community (Mental Health, Substance Abuse, psychological counseling)  Do not be alone:Call your Safe Contact- someone whom you trust who will be there for you.  Call your local CRISIS HOTLINE 664-1735 or 106-277-8143  Call your local Children's Mobile Crisis Response Team [place] (283) 532-7954 or www.[employer]  Call the toll free National Suicide Prevention Hotlines   National Suicide Prevention Lifeline 833-258-WFMY (2180)  National Hope Line Network 800-SUICIDE (951-6485)    ·

## 2017-04-22 NOTE — PROGRESS NOTES
Seen by Dr Larry with Ok to dc home today,  Given verbal home Instructions by Md and handed Rx to Patient  IVF to [facility].  Will discharge today as soon as ride is here.

## 2017-04-22 NOTE — PROGRESS NOTES
Assessment completed. WDL. Vital signs stable. AA&O4. Abdominal dressing in place. IV fluids running. Call light within reach. Pt denies any other issues at this time. Pt encouraged to call for any needs.

## 2017-04-22 NOTE — CARE PLAN
Problem: Safety  Goal: Will remain free from falls  Outcome: PROGRESSING AS EXPECTED  Fall precautions in place: treaded slipper socks on, mobility signs posted, hourly rounding, bed in lowest position, belongings and call light are within reach, family at bedside, and near nurses station.    Problem: Pain Management  Goal: Pain level will decrease to patient’s comfort goal  Outcome: PROGRESSING AS EXPECTED  Pain managed well with PRN medication at this time.

## 2017-04-22 NOTE — CARE PLAN
Problem: Safety  Goal: Will remain free from injury  Outcome: PROGRESSING AS EXPECTED  Treaded slipper socks on, bed in lowest position.    Problem: Infection  Goal: Will remain free from infection  Outcome: PROGRESSING AS EXPECTED  Patient is afebrile. Incision approximated,dressing in place. No drainage noted. No signs and symptoms of infection noted.

## 2017-05-19 NOTE — PROGRESS NOTES
Post op day 1 4/23/17 The patient is doing well post op day 1 LAVH/Bilateral salpingectomy.  Her vital signs are stable.  The incisions are healing well.  Her abdoment is soft and she is passing flatus.  Homes's sign is negative.    Post op day 1 -  LAVH/BS&O  Discharge home on Norco 7.5 mg/325 mg orally every 4-6 hrs. Prn pain.  Sonny Larry M.D.
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
## 2017-11-30 ENCOUNTER — HOSPITAL ENCOUNTER (EMERGENCY)
Facility: MEDICAL CENTER | Age: 27
End: 2017-11-30
Attending: EMERGENCY MEDICINE
Payer: MEDICAID

## 2017-11-30 VITALS
HEART RATE: 85 BPM | BODY MASS INDEX: 26.87 KG/M2 | DIASTOLIC BLOOD PRESSURE: 85 MMHG | TEMPERATURE: 97.3 F | RESPIRATION RATE: 16 BRPM | HEIGHT: 64 IN | OXYGEN SATURATION: 97 % | SYSTOLIC BLOOD PRESSURE: 115 MMHG | WEIGHT: 157.41 LBS

## 2017-11-30 DIAGNOSIS — R11.11 VOMITING WITHOUT NAUSEA, INTRACTABILITY OF VOMITING NOT SPECIFIED, UNSPECIFIED VOMITING TYPE: ICD-10-CM

## 2017-11-30 DIAGNOSIS — R19.7 DIARRHEA, UNSPECIFIED TYPE: ICD-10-CM

## 2017-11-30 DIAGNOSIS — R10.9 ABDOMINAL PAIN, UNSPECIFIED ABDOMINAL LOCATION: ICD-10-CM

## 2017-11-30 LAB
ALBUMIN SERPL BCP-MCNC: 4.5 G/DL (ref 3.2–4.9)
ALBUMIN/GLOB SERPL: 1.5 G/DL
ALP SERPL-CCNC: 91 U/L (ref 30–99)
ALT SERPL-CCNC: 12 U/L (ref 2–50)
ANION GAP SERPL CALC-SCNC: 7 MMOL/L (ref 0–11.9)
AST SERPL-CCNC: 13 U/L (ref 12–45)
BASOPHILS # BLD AUTO: 0.2 % (ref 0–1.8)
BASOPHILS # BLD: 0.02 K/UL (ref 0–0.12)
BILIRUB SERPL-MCNC: 0.6 MG/DL (ref 0.1–1.5)
BUN SERPL-MCNC: 13 MG/DL (ref 8–22)
CALCIUM SERPL-MCNC: 9.7 MG/DL (ref 8.5–10.5)
CHLORIDE SERPL-SCNC: 110 MMOL/L (ref 96–112)
CO2 SERPL-SCNC: 22 MMOL/L (ref 20–33)
CREAT SERPL-MCNC: 0.56 MG/DL (ref 0.5–1.4)
EOSINOPHIL # BLD AUTO: 0.05 K/UL (ref 0–0.51)
EOSINOPHIL NFR BLD: 0.6 % (ref 0–6.9)
ERYTHROCYTE [DISTWIDTH] IN BLOOD BY AUTOMATED COUNT: 38.3 FL (ref 35.9–50)
GFR SERPL CREATININE-BSD FRML MDRD: >60 ML/MIN/1.73 M 2
GLOBULIN SER CALC-MCNC: 3.1 G/DL (ref 1.9–3.5)
GLUCOSE SERPL-MCNC: 94 MG/DL (ref 65–99)
HCT VFR BLD AUTO: 44 % (ref 37–47)
HGB BLD-MCNC: 15.2 G/DL (ref 12–16)
IMM GRANULOCYTES # BLD AUTO: 0.02 K/UL (ref 0–0.11)
IMM GRANULOCYTES NFR BLD AUTO: 0.2 % (ref 0–0.9)
LIPASE SERPL-CCNC: 14 U/L (ref 11–82)
LYMPHOCYTES # BLD AUTO: 1.27 K/UL (ref 1–4.8)
LYMPHOCYTES NFR BLD: 15.4 % (ref 22–41)
MCH RBC QN AUTO: 28.4 PG (ref 27–33)
MCHC RBC AUTO-ENTMCNC: 34.5 G/DL (ref 33.6–35)
MCV RBC AUTO: 82.1 FL (ref 81.4–97.8)
MONOCYTES # BLD AUTO: 0.43 K/UL (ref 0–0.85)
MONOCYTES NFR BLD AUTO: 5.2 % (ref 0–13.4)
NEUTROPHILS # BLD AUTO: 6.45 K/UL (ref 2–7.15)
NEUTROPHILS NFR BLD: 78.4 % (ref 44–72)
NRBC # BLD AUTO: 0 K/UL
NRBC BLD AUTO-RTO: 0 /100 WBC
PLATELET # BLD AUTO: 242 K/UL (ref 164–446)
PMV BLD AUTO: 9.9 FL (ref 9–12.9)
POTASSIUM SERPL-SCNC: 3.4 MMOL/L (ref 3.6–5.5)
PROT SERPL-MCNC: 7.6 G/DL (ref 6–8.2)
RBC # BLD AUTO: 5.36 M/UL (ref 4.2–5.4)
SODIUM SERPL-SCNC: 139 MMOL/L (ref 135–145)
WBC # BLD AUTO: 8.2 K/UL (ref 4.8–10.8)

## 2017-11-30 PROCEDURE — 700105 HCHG RX REV CODE 258: Performed by: EMERGENCY MEDICINE

## 2017-11-30 PROCEDURE — 700111 HCHG RX REV CODE 636 W/ 250 OVERRIDE (IP): Performed by: EMERGENCY MEDICINE

## 2017-11-30 PROCEDURE — 36415 COLL VENOUS BLD VENIPUNCTURE: CPT

## 2017-11-30 PROCEDURE — 99285 EMERGENCY DEPT VISIT HI MDM: CPT

## 2017-11-30 PROCEDURE — 96374 THER/PROPH/DIAG INJ IV PUSH: CPT

## 2017-11-30 PROCEDURE — 80053 COMPREHEN METABOLIC PANEL: CPT

## 2017-11-30 PROCEDURE — 83690 ASSAY OF LIPASE: CPT

## 2017-11-30 PROCEDURE — 85025 COMPLETE CBC W/AUTO DIFF WBC: CPT

## 2017-11-30 RX ORDER — ONDANSETRON 2 MG/ML
4 INJECTION INTRAMUSCULAR; INTRAVENOUS ONCE
Status: COMPLETED | OUTPATIENT
Start: 2017-11-30 | End: 2017-11-30

## 2017-11-30 RX ORDER — DICYCLOMINE HCL 20 MG
20 TABLET ORAL 4 TIMES DAILY PRN
Qty: 20 TAB | Refills: 0 | Status: SHIPPED | OUTPATIENT
Start: 2017-11-30 | End: 2018-06-25

## 2017-11-30 RX ORDER — ONDANSETRON 4 MG/1
4 TABLET, ORALLY DISINTEGRATING ORAL EVERY 8 HOURS PRN
Qty: 20 TAB | Refills: 0 | Status: SHIPPED | OUTPATIENT
Start: 2017-11-30 | End: 2018-06-25

## 2017-11-30 RX ORDER — SODIUM CHLORIDE 9 MG/ML
1000 INJECTION, SOLUTION INTRAVENOUS ONCE
Status: COMPLETED | OUTPATIENT
Start: 2017-11-30 | End: 2017-11-30

## 2017-11-30 RX ORDER — CIPROFLOXACIN 500 MG/1
500 TABLET, FILM COATED ORAL 2 TIMES DAILY
Qty: 6 TAB | Refills: 0 | Status: SHIPPED | OUTPATIENT
Start: 2017-11-30 | End: 2017-12-03

## 2017-11-30 RX ADMIN — ONDANSETRON 4 MG: 2 INJECTION INTRAMUSCULAR; INTRAVENOUS at 09:45

## 2017-11-30 RX ADMIN — SODIUM CHLORIDE 1000 ML: 9 INJECTION, SOLUTION INTRAVENOUS at 09:45

## 2017-11-30 ASSESSMENT — PAIN SCALES - GENERAL: PAINLEVEL_OUTOF10: 8

## 2017-11-30 ASSESSMENT — ENCOUNTER SYMPTOMS
BACK PAIN: 1
ABDOMINAL PAIN: 1
FEVER: 0
VOMITING: 1
DIARRHEA: 1
CHILLS: 0
NAUSEA: 1

## 2017-11-30 NOTE — ED NOTES
Chief Complaint   Patient presents with   • Abdominal Pain     Pt reports since Sat/ family all had similar symptoms but pt not getting better. pt reports night sweats/chills.    • Diarrhea   • N/V   • Chills     Explained to pt triage process, made pt aware to tell this RN of any changes/concerns, pt verbalized understanding of process and instructions given. Pt to ER lobby.

## 2017-11-30 NOTE — ED PROVIDER NOTES
ED Provider Note    Scribed for Sergio Jones M.D. by Kaleb Murdock. 11/30/2017, 9:12 AM.    Primary care provider: Uri Lomas M.D.  Means of arrival: walk in  History obtained from: patient  History limited by: none    CHIEF COMPLAINT  Chief Complaint   Patient presents with   • Abdominal Pain     Pt reports since Sat/ family all had similar symptoms but pt not getting better. pt reports night sweats/chills.    • Diarrhea   • N/V   • Chills       HPI  Corine Baugh is a 27 y.o. female who presents to the Emergency Department complaining of constant and worsening abdominal pain for the last 5 days. Patient describes her pain as 8/10 severity. She reports associated nausea, vomiting, and diarrhea, dark stool, back pain. Patient states that her family was sick with similar symptoms, but recovered 2 days ago. She presents today because her vomiting suddenly increased in frequency and is now only vomiting stomach acid. Patient repost a medical history of endometriosis, hysterectomy. She denies fever, chills, cigarette use.    REVIEW OF SYSTEMS  Review of Systems   Constitutional: Negative for chills and fever.   Gastrointestinal: Positive for abdominal pain, diarrhea, nausea and vomiting.        Positive dark stool,    Musculoskeletal: Positive for back pain.   All other systems reviewed and are negative.  C.    PAST MEDICAL HISTORY   has a past medical history of Asthma; Bowel habit changes; Depression; Gynecological disorder (2017); Heart burn; Hemorrhagic disorder (CMS-HCC); Herpes genitalia; Migraine aura without headache; Pain (2017); Seizure disorder (CMS-HCC); and Snoring.    SURGICAL HISTORY   has a past surgical history that includes gyn surgery (2014); vaginal hysterectomy scope total (Bilateral, 4/21/2017); and cystoscopy (4/21/2017).    SOCIAL HISTORY  Social History   Substance Use Topics   • Smoking status: Former Smoker     Packs/day: 0.10     Years: 0.50   • Smokeless tobacco: None noted  "  • Alcohol use Yes      Comment: 0-1 per month      History   Drug Use No     Comment: denies       FAMILY HISTORY  None noted    CURRENT MEDICATIONS  No current facility-administered medications for this encounter.     Current Outpatient Prescriptions:   •  ibuprofen (MOTRIN) 200 MG Tab, Take 600 mg by mouth every 6 hours as needed., Disp: , Rfl:   •  acetaminophen (TYLENOL) 500 MG Tab, Take 1,500 mg by mouth 3 times a day as needed., Disp: , Rfl:   •  Multiple Vitamins-Minerals (MULTI-VITAMIN GUMMIES PO), Take  by mouth as needed., Disp: , Rfl:   •  sumatriptan (IMITREX) 100 MG tablet, Take 100 mg by mouth Once PRN for Migraine., Disp: , Rfl:   •  ranitidine (ZANTAC) 75 MG tablet, Take 75 mg by mouth 2 times a day., Disp: , Rfl:   •  acetaminophen/caffeine/butalbital 325-40-50 mg (FIORICET) -40 MG Tab, Take 1 Tab by mouth every four hours as needed for Headache., Disp: , Rfl:   •  acyclovir (ZOVIRAX) 800 MG Tab, Take 800 mg by mouth 2 times a day as needed., Disp: , Rfl:   •  acyclovir (ZOVIRAX) 400 MG tablet, Take 400 mg by mouth 2 times a day., Disp: , Rfl:   •  mometasone (ASMANEX) 220 MCG/INH inhaler, Inhale 2 Puffs by mouth every day., Disp: , Rfl:   •  albuterol (VENTOLIN HFA) 108 (90 BASE) MCG/ACT Aero Soln inhalation aerosol, Inhale 2 Puffs by mouth every 6 hours as needed for Shortness of Breath., Disp: , Rfl:     ALLERGIES  Allergies   Allergen Reactions   • Demerol      \"I go crazy.\"   • Biaxin [Clarithromycin] Hives   • Bee    • Betadine [Povidone Iodine]      Told by MD not to use   • Food Nausea and Swelling     Avocado, banana, eggplant, mushroom, lavander  Lip swelling, mouth tingling itchy throat    • Iodine      Told by MD not to use   • Latex Rash     Spreads beyond contact site   • Macrobid [Kdc:Red Dye+Yellow Dye+Nitrofurantoin+Brilliant Blue Fcf] Swelling   • Shellfish Allergy Anaphylaxis   • Singulair      Felt \"mean\"       PHYSICAL EXAM  VITAL SIGNS: /88   Pulse 90   Temp " "36.2 °C (97.2 °F)   Resp 16   Ht 1.626 m (5' 4\")   Wt 71.4 kg (157 lb 6.5 oz)   LMP 04/19/2011   SpO2 95%   BMI 27.02 kg/m²     Constitutional:  No acute distress  HENT:  Dry mucous membranes  Eyes: No conjunctivitis or icterus  Neck:  trachea is midline, no palpable thyroid  Lymphatic:  No cervical lymphadenopathy  Cardiovascular:  Regular rate and rhythm, no murmurs  Thorax & Lungs:  Normal breath sounds, no rhonchi  Abdomen:  Soft, Diffuse tenderness.   Skin:.  no rash  Back:  Non-tender, no CVA tenderness  Extremities:   no edema  Vascular:  symmetric radial pulse  Neurologic:  Normal gross motor    LABS  Labs Reviewed   CBC WITH DIFFERENTIAL - Abnormal; Notable for the following:        Result Value    Neutrophils-Polys 78.40 (*)     Lymphocytes 15.40 (*)     All other components within normal limits   COMP METABOLIC PANEL - Abnormal; Notable for the following:     Potassium 3.4 (*)     All other components within normal limits   LIPASE   ESTIMATED GFR   All labs reviewed by me.    COURSE & MEDICAL DECISION MAKING  Pertinent Labs & Imaging studies reviewed. (See chart for details)    9:12 AM - Patient seen and examined at bedside. Patient will be treated with NS 1000 ml for dehydration, Zofran 4 mg. Ordered Estimated GFR, CBC with differential, CMP, Lipase to evaluate her symptoms. The differential diagnoses include but are not limited to: possible colitis and will evaluate for dehydration.     10:19 AM - Recheck: Patient re-evaluated at beside. Patient reports feeling improved with only a headache currently. Patient's lab results discussed. Discussed patient's condition and treatment plan. Patient will be discharged with instructions and provided with strict return precautions. Patient will be sent home with a prescription for Zofran, Bentyl, Cipro. Advised to follow up with a primary care physician that I will provide information for. Instructed to return to Emergency Department immediately if any new " or worsening symptoms.    Medical Decision Making:  Patient's workup is negative she feels improved. She is discharged with medications and return precautions    The patient will return for new or worsening symptoms and is stable at the time of discharge.      DISPOSITION:  Patient will be discharged home in stable condition.    FOLLOW UP:  KENISHA HA  [address] [address]  [phone]          OUTPATIENT MEDICATIONS:  New Prescriptions    CIPROFLOXACIN (CIPRO) 500 MG TAB    Take 1 Tab by mouth 2 times a day for 3 days.    DICYCLOMINE (BENTYL) 20 MG TAB    Take 1 Tab by mouth 4 times a day as needed (abdominal pain).    ONDANSETRON (ZOFRAN ODT) 4 MG TABLET DISPERSIBLE    Take 1 Tab by mouth every 8 hours as needed.         FINAL IMPRESSION  1. Abdominal pain, unspecified abdominal location    2. Vomiting without nausea, intractability of vomiting not specified, unspecified vomiting type    3. Diarrhea, unspecified type          I, Kaleb Murdock (Scribe), am scribing for, and in the presence of, Sergio Jones M.D..    Electronically signed by: Kaleb Murdock (Scribe), 11/30/2017    ISergio M.D. personally performed the services described in this documentation, as scribed by Kaleb Murdock in my presence, and it is both accurate and complete.    The note accurately reflects work and decisions made by me.  Sergio Jones  11/30/2017  3:01 PM

## 2017-11-30 NOTE — DISCHARGE INSTRUCTIONS
Abdominal Pain, Women  Unclear what causing her abdominal pain today. Return if any worsening pain. I have given a referral follow-up call next week for appointment  Abdominal (stomach, pelvic, or belly) pain can be caused by many things. It is important to tell your doctor:  · The location of the pain.  · Does it come and go or is it present all the time?  · Are there things that start the pain (eating certain foods, exercise)?  · Are there other symptoms associated with the pain (fever, nausea, vomiting, diarrhea)?  All of this is helpful to know when trying to find the cause of the pain.  CAUSES   · Stomach: virus or bacteria infection, or ulcer.  · Intestine: appendicitis (inflamed appendix), regional ileitis (Crohn's disease), ulcerative colitis (inflamed colon), irritable bowel syndrome, diverticulitis (inflamed diverticulum of the colon), or cancer of the stomach or intestine.  · Gallbladder disease or stones in the gallbladder.  · Kidney disease, kidney stones, or infection.  · Pancreas infection or cancer.  · Fibromyalgia (pain disorder).  · Diseases of the female organs:  · Uterus: fibroid (non-cancerous) tumors or infection.  · Fallopian tubes: infection or tubal pregnancy.  · Ovary: cysts or tumors.  · Pelvic adhesions (scar tissue).  · Endometriosis (uterus lining tissue growing in the pelvis and on the pelvic organs).  · Pelvic congestion syndrome (female organs filling up with blood just before the menstrual period).  · Pain with the menstrual period.  · Pain with ovulation (producing an egg).  · Pain with an IUD (intrauterine device, birth control) in the uterus.  · Cancer of the female organs.  · Functional pain (pain not caused by a disease, may improve without treatment).  · Psychological pain.  · Depression.  DIAGNOSIS   Your doctor will decide the seriousness of your pain by doing an examination.  · Blood tests.  · X-rays.  · Ultrasound.  · CT scan (computed tomography, special type of  X-ray).  · MRI (magnetic resonance imaging).  · Cultures, for infection.  · Barium enema (dye inserted in the large intestine, to better view it with X-rays).  · Colonoscopy (looking in intestine with a lighted tube).  · Laparoscopy (minor surgery, looking in abdomen with a lighted tube).  · Major abdominal exploratory surgery (looking in abdomen with a large incision).  TREATMENT   The treatment will depend on the cause of the pain.   · Many cases can be observed and treated at home.  · Over-the-counter medicines recommended by your caregiver.  · Prescription medicine.  · Antibiotics, for infection.  · Birth control pills, for painful periods or for ovulation pain.  · Hormone treatment, for endometriosis.  · Nerve blocking injections.  · Physical therapy.  · Antidepressants.  · Counseling with a psychologist or psychiatrist.  · Minor or major surgery.  HOME CARE INSTRUCTIONS   · Do not take laxatives, unless directed by your caregiver.  · Take over-the-counter pain medicine only if ordered by your caregiver. Do not take aspirin because it can cause an upset stomach or bleeding.  · Try a clear liquid diet (broth or water) as ordered by your caregiver. Slowly move to a bland diet, as tolerated, if the pain is related to the stomach or intestine.  · Have a thermometer and take your temperature several times a day, and record it.  · Bed rest and sleep, if it helps the pain.  · Avoid sexual intercourse, if it causes pain.  · Avoid stressful situations.  · Keep your follow-up appointments and tests, as your caregiver orders.  · If the pain does not go away with medicine or surgery, you may try:  · Acupuncture.  · Relaxation exercises (yoga, meditation).  · Group therapy.  · Counseling.  SEEK MEDICAL CARE IF:   · You notice certain foods cause stomach pain.  · Your home care treatment is not helping your pain.  · You need stronger pain medicine.  · You want your IUD removed.  · You feel faint or lightheaded.  · You  develop nausea and vomiting.  · You develop a rash.  · You are having side effects or an allergy to your medicine.  SEEK IMMEDIATE MEDICAL CARE IF:   · Your pain does not go away or gets worse.  · You have a fever.  · Your pain is felt only in portions of the abdomen. The right side could possibly be appendicitis. The left lower portion of the abdomen could be colitis or diverticulitis.  · You are passing blood in your stools (bright red or black tarry stools, with or without vomiting).  · You have blood in your urine.  · You develop chills, with or without a fever.  · You pass out.  MAKE SURE YOU:   · Understand these instructions.  · Will watch your condition.  · Will get help right away if you are not doing well or get worse.  Document Released: 10/14/2008 Document Revised: 03/11/2013 Document Reviewed: 11/04/2010  [employer]® Patient Information ©2014 [employer].

## 2018-01-22 ENCOUNTER — OFFICE VISIT (OUTPATIENT)
Dept: MEDICAL GROUP | Facility: MEDICAL CENTER | Age: 28
End: 2018-01-22
Attending: NURSE PRACTITIONER
Payer: MEDICAID

## 2018-01-22 VITALS
OXYGEN SATURATION: 97 % | WEIGHT: 157.2 LBS | DIASTOLIC BLOOD PRESSURE: 62 MMHG | TEMPERATURE: 97.1 F | BODY MASS INDEX: 26.19 KG/M2 | SYSTOLIC BLOOD PRESSURE: 112 MMHG | HEART RATE: 90 BPM | HEIGHT: 65 IN | RESPIRATION RATE: 16 BRPM

## 2018-01-22 DIAGNOSIS — J35.1 HYPERTROPHY OF TONSILS: ICD-10-CM

## 2018-01-22 DIAGNOSIS — G43.109 MIGRAINE WITH AURA AND WITHOUT STATUS MIGRAINOSUS, NOT INTRACTABLE: ICD-10-CM

## 2018-01-22 DIAGNOSIS — Z23 NEED FOR VACCINATION: ICD-10-CM

## 2018-01-22 DIAGNOSIS — Z87.892 HISTORY OF ANAPHYLAXIS: ICD-10-CM

## 2018-01-22 DIAGNOSIS — R06.83 SNORING: ICD-10-CM

## 2018-01-22 DIAGNOSIS — K52.9 CHRONIC DIARRHEA OF UNKNOWN ORIGIN: ICD-10-CM

## 2018-01-22 DIAGNOSIS — F31.9 BIPOLAR DEPRESSION (HCC): ICD-10-CM

## 2018-01-22 DIAGNOSIS — R10.2 PELVIC PAIN: ICD-10-CM

## 2018-01-22 DIAGNOSIS — E66.3 OVERWEIGHT (BMI 25.0-29.9): ICD-10-CM

## 2018-01-22 PROCEDURE — 99204 OFFICE O/P NEW MOD 45 MIN: CPT | Performed by: NURSE PRACTITIONER

## 2018-01-22 PROCEDURE — 90715 TDAP VACCINE 7 YRS/> IM: CPT

## 2018-01-22 PROCEDURE — 90471 IMMUNIZATION ADMIN: CPT

## 2018-01-22 PROCEDURE — 90686 IIV4 VACC NO PRSV 0.5 ML IM: CPT

## 2018-01-22 PROCEDURE — 99203 OFFICE O/P NEW LOW 30 MIN: CPT | Mod: 25 | Performed by: NURSE PRACTITIONER

## 2018-01-22 RX ORDER — SUMATRIPTAN 100 MG/1
100 TABLET, FILM COATED ORAL
Qty: 10 TAB | Refills: 3 | Status: SHIPPED | OUTPATIENT
Start: 2018-01-22 | End: 2018-06-25

## 2018-01-22 RX ORDER — EPINEPHRINE 0.3 MG/.3ML
0.3 INJECTION SUBCUTANEOUS ONCE
Qty: 0.3 ML | Refills: 1 | Status: SHIPPED | OUTPATIENT
Start: 2018-01-22 | End: 2018-01-22

## 2018-01-22 NOTE — PROGRESS NOTES
"  Chief Complaint   Patient presents with   • Establish Care     Corine Baugh is a 27 y.o. female here to establish care  HPI:    No problem-specific Assessment & Plan notes found for this encounter.    New-onset pelvic pain s/p hysterectomy. She had the hysterectomy in May for endometriosis, adenomyosis and fibroids and was pain-free for two months. However, the pain returned in July and is getting progressively worse. It is now happening daily. The pain is located in her lower back and lower abdomen. It is a 6/10 cramping, worse at night. No fevers.     She also diarrhea, multiple times per day since the surgery. It worsens with fried food, fast food and spicy food. Not taking any meds. This was never a problem in the past. Has never seen GI    H/o migraines. Takes imitrex about 1-3 times per month when she \"can't see\" and knows her migraines will be severe. Otherwise she takes fioricet daily. She requests refills for both. Has never seen pain specialist. Have never had injections for the migraines    She also has a h/o bipolar depression. States she has been under the care of a psychiatrist since she was 5. Was previously taking lamictal 100mg BID but stopped taking it a few years ago. Does not currently see psychiatry nor a therapist.     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 Her epipen  in .     Current medicines (including changes today)  Current Outpatient Prescriptions   Medication Sig Dispense Refill   • EPINEPHrine (EPIPEN) 0.3 MG/0.3ML Solution Auto-injector solution for injection 0.3 mL by Intramuscular route Once for 1 dose. 0.3 mL 1   • sumatriptan (IMITREX) 100 MG tablet Take 1 Tab by mouth Once PRN for Migraine for up to 1 dose. 10 Tab 3   • dicyclomine (BENTYL) 20 MG Tab Take 1 Tab by mouth 4 times a day as needed (abdominal pain). 20 Tab 0   • ondansetron " (ZOFRAN ODT) 4 MG TABLET DISPERSIBLE Take 1 Tab by mouth every 8 hours as needed. 20 Tab 0   • ibuprofen (MOTRIN) 200 MG Tab Take 600 mg by mouth every 6 hours as needed.     • acetaminophen (TYLENOL) 500 MG Tab Take 1,500 mg by mouth 3 times a day as needed.     • Multiple Vitamins-Minerals (MULTI-VITAMIN GUMMIES PO) Take  by mouth as needed.     • ranitidine (ZANTAC) 75 MG tablet Take 75 mg by mouth 2 times a day.     • acetaminophen/caffeine/butalbital 325-40-50 mg (FIORICET) -40 MG Tab Take 1 Tab by mouth every four hours as needed for Headache.     • acyclovir (ZOVIRAX) 800 MG Tab Take 800 mg by mouth 2 times a day as needed.     • acyclovir (ZOVIRAX) 400 MG tablet Take 400 mg by mouth 2 times a day.     • mometasone (ASMANEX) 220 MCG/INH inhaler Inhale 2 Puffs by mouth every day.     • albuterol (VENTOLIN HFA) 108 (90 BASE) MCG/ACT Aero Soln inhalation aerosol Inhale 2 Puffs by mouth every 6 hours as needed for Shortness of Breath.       No current facility-administered medications for this visit.      She  has a past medical history of Asthma; Bowel habit changes; Depression; Gynecological disorder (2017); Heart burn; Herpes genitalia; Migraine aura without headache; Pain (2017); Seizure disorder (CMS-HCC); and Snoring. She also has no past medical history of Anxiety; Arthritis; Blood transfusion; Cancer (CMS-HCC); Clotting disorder (CMS-HCC); Diabetes; Heart attack; Hypertension; Kidney disease; Seizure (CMS-HCC); Thyroid disease; or Tuberculosis.  She  has a past surgical history that includes gyn surgery (2014); vaginal hysterectomy scope total (Bilateral, 4/21/2017); and cystoscopy (4/21/2017).  Social History   Substance Use Topics   • Smoking status: Former Smoker     Packs/day: 0.10     Years: 0.50   • Smokeless tobacco: Never Used   • Alcohol use Yes      Comment: 1 glass wine every 2 weeks     Social History     Social History Narrative   • No narrative on file     History reviewed. No  "pertinent family history.  Family Status   Relation Status   • Mother Alive    pt was adopted does not know any thing about her birth parents.   • Father    • Sister    • Brother Alive         ROS:   As above in HPI. All other systems reviewed and are negative      Objective:     Blood pressure 112/62, pulse 90, temperature 36.2 °C (97.1 °F), resp. rate 16, height 1.638 m (5' 4.5\"), weight 71.3 kg (157 lb 3.2 oz), last menstrual period 2011, SpO2 97 %. Body mass index is 26.57 kg/m².  Physical Exam:    Alert, oriented in no acute distress.  Eye contact is good, speech goal directed, affect bright.  HEENT: EOMI, PERRL, conjunctiva non-injected, sclera non-icteric.  Erica pinnae, external auditory canals, TM pearly gray with normal light reflex bilaterally.  Oral mucous membranes pink and moist with no lesions.  Neck supple with no cervical lymphadenopathy, JVD, palpable thyroid nodules   Lungs: clear to auscultation bilaterally with good excursion.  CV: regular rate and rhythm.  Abdomen soft, non-distended, with normal bowel sounds. Tenderness to deep palpation in all quadrants. No CVAT, no rebound tenderness  Lower extremities color normal, vascularity normal, no edema, temperature normal  Skin: no rashes or lesions in visible areas.  MSK: full ROM in 4 extremities. Normal gait. No joint swelling/deformity.       Assessment and Plan:   The following treatment plan was discussed   1. Chronic diarrhea of unknown origin  REFERRAL TO GASTROENTEROLOGY   2. Snoring  REFERRAL TO ENT   3. Hypertrophy of tonsils  REFERRAL TO ENT   4. Pelvic pain  US-ABDOMEN COMPLETE SURVEY    AMYLASE    LIPASE    COMP METABOLIC PANEL   5. Bipolar depression (CMS-HCC)  REFERRAL TO PSYCHIATRY   6. Overweight (BMI 25.0-29.9)  CBC WITH DIFFERENTIAL    LIPID PANEL    HEMOGLOBIN A1C    TSH WITH REFLEX TO FT4   7. History of anaphylaxis     8. Need for vaccination  Flu Quad Inj >3 Year Pre-Filled PF    TDAP VACCINE =>8YO IM    " Quantiferon Gold TB (PPD)   9. Migraine with aura and without status migrainosus, not intractable  REFERRAL TO PAIN CLINIC         Records requested.  Followup: Return in about 2 weeks (around 2/5/2018) for lab f/u.

## 2018-01-29 ENCOUNTER — TELEPHONE (OUTPATIENT)
Dept: MEDICAL GROUP | Facility: MEDICAL CENTER | Age: 28
End: 2018-01-29

## 2018-01-29 DIAGNOSIS — R10.2 PELVIC PAIN: ICD-10-CM

## 2018-01-29 NOTE — TELEPHONE ENCOUNTER
Pt lvm stating that the pain management referral has her going to [facility]. She stated that she called medicaid and they told her [facility] takes her insurance for pain management and she called them to confirm that they do as well. She would like and updated referral placed in the system, her cb # is [phone] (home)

## 2018-02-07 ENCOUNTER — TELEPHONE (OUTPATIENT)
Dept: MEDICAL GROUP | Facility: MEDICAL CENTER | Age: 28
End: 2018-02-07

## 2018-02-07 NOTE — TELEPHONE ENCOUNTER
Called Varinder from radiology to confirm abdominal US - I am looking for gallstones. L/M with this information

## 2018-02-07 NOTE — TELEPHONE ENCOUNTER
Varinder from [facility] radiology called and needed clarification for the abdomen US that was ordered, he stated that according to chart notes pt is having pelvic pain and he wanted to know if it should be a pelvic US instead- he would like a cb @ [phone]

## 2018-02-12 ENCOUNTER — HOSPITAL ENCOUNTER (OUTPATIENT)
Dept: RADIOLOGY | Facility: MEDICAL CENTER | Age: 28
End: 2018-02-12
Attending: NURSE PRACTITIONER
Payer: MEDICAID

## 2018-02-12 ENCOUNTER — TELEPHONE (OUTPATIENT)
Dept: MEDICAL GROUP | Facility: MEDICAL CENTER | Age: 28
End: 2018-02-12

## 2018-02-12 DIAGNOSIS — R10.2 PELVIC PAIN: ICD-10-CM

## 2018-02-12 DIAGNOSIS — S03.00XD TMJ (DISLOCATION OF TEMPOROMANDIBULAR JOINT), SUBSEQUENT ENCOUNTER: ICD-10-CM

## 2018-02-12 PROCEDURE — 76700 US EXAM ABDOM COMPLETE: CPT

## 2018-02-12 NOTE — TELEPHONE ENCOUNTER
VOICEMAIL  1. Caller Name: Corine                       Call Back Number: [phone] (home)       2. Message: lv she would like her lab results     3. Patient approves office to leave a detailed voicemail/MyChart message: N\A

## 2018-02-12 NOTE — TELEPHONE ENCOUNTER
Please schedule Corine mjaor f/u to discuss her lab results (mildly elevated cholesterol). Discussed nutrition: decrease fatty food, fried foods, red meats and fast food. Questions answered.  She is also requesting a referral for maxillofacial surgeon for h/o severe TMJ. States she wore head gear for 9 years and urged by her orthodontist to have it done. She is finally ready and requests referral. Referral generated today.

## 2018-02-12 NOTE — TELEPHONE ENCOUNTER
Mom requests over the phone lab results, she stsarted a new job and is having a hard time getting time off work

## 2018-02-12 NOTE — TELEPHONE ENCOUNTER
Spoke with Corine. Her cholesterol is mildly elevated. Discussed nutrition eduction: decreasing fatty foods, fried foods, red meat, fast food. Questions answered.

## 2018-02-20 ENCOUNTER — TELEPHONE (OUTPATIENT)
Dept: MEDICAL GROUP | Facility: MEDICAL CENTER | Age: 28
End: 2018-02-20

## 2018-02-20 NOTE — TELEPHONE ENCOUNTER
Pt called and stated she would like to speak with Marilyn regarding a referral her cb number [phone] (home)

## 2018-05-02 ENCOUNTER — PATIENT MESSAGE (OUTPATIENT)
Dept: MEDICAL GROUP | Facility: MEDICAL CENTER | Age: 28
End: 2018-05-02

## 2018-05-02 RX ORDER — POLYMYXIN B SULFATE AND TRIMETHOPRIM 1; 10000 MG/ML; [USP'U]/ML
1 SOLUTION OPHTHALMIC EVERY 4 HOURS
Qty: 10 ML | Refills: 1 | Status: SHIPPED | OUTPATIENT
Start: 2018-05-02 | End: 2018-06-25

## 2018-06-25 DIAGNOSIS — Z01.812 PRE-OPERATIVE LABORATORY EXAMINATION: ICD-10-CM

## 2018-06-25 LAB
ANION GAP SERPL CALC-SCNC: 9 MMOL/L (ref 0–11.9)
BUN SERPL-MCNC: 14 MG/DL (ref 8–22)
CALCIUM SERPL-MCNC: 9.9 MG/DL (ref 8.5–10.5)
CHLORIDE SERPL-SCNC: 105 MMOL/L (ref 96–112)
CO2 SERPL-SCNC: 25 MMOL/L (ref 20–33)
CREAT SERPL-MCNC: 0.76 MG/DL (ref 0.5–1.4)
ERYTHROCYTE [DISTWIDTH] IN BLOOD BY AUTOMATED COUNT: 40.2 FL (ref 35.9–50)
GLUCOSE SERPL-MCNC: 89 MG/DL (ref 65–99)
HCT VFR BLD AUTO: 45.9 % (ref 37–47)
HGB BLD-MCNC: 15 G/DL (ref 12–16)
MCH RBC QN AUTO: 28.3 PG (ref 27–33)
MCHC RBC AUTO-ENTMCNC: 32.7 G/DL (ref 33.6–35)
MCV RBC AUTO: 86.6 FL (ref 81.4–97.8)
PLATELET # BLD AUTO: 278 K/UL (ref 164–446)
PMV BLD AUTO: 10 FL (ref 9–12.9)
POTASSIUM SERPL-SCNC: 3.7 MMOL/L (ref 3.6–5.5)
RBC # BLD AUTO: 5.3 M/UL (ref 4.2–5.4)
SODIUM SERPL-SCNC: 139 MMOL/L (ref 135–145)
WBC # BLD AUTO: 9.5 K/UL (ref 4.8–10.8)

## 2018-06-25 PROCEDURE — 85027 COMPLETE CBC AUTOMATED: CPT

## 2018-06-25 PROCEDURE — 36415 COLL VENOUS BLD VENIPUNCTURE: CPT

## 2018-06-25 PROCEDURE — 80048 BASIC METABOLIC PNL TOTAL CA: CPT

## 2018-06-27 ENCOUNTER — OFFICE VISIT (OUTPATIENT)
Dept: NEUROLOGY | Facility: MEDICAL CENTER | Age: 28
End: 2018-06-27
Payer: MEDICAID

## 2018-06-27 VITALS
HEIGHT: 64 IN | HEART RATE: 80 BPM | OXYGEN SATURATION: 96 % | BODY MASS INDEX: 26.65 KG/M2 | SYSTOLIC BLOOD PRESSURE: 114 MMHG | TEMPERATURE: 97 F | WEIGHT: 156.09 LBS | DIASTOLIC BLOOD PRESSURE: 70 MMHG | RESPIRATION RATE: 18 BRPM

## 2018-06-27 PROCEDURE — 99204 OFFICE O/P NEW MOD 45 MIN: CPT | Performed by: NURSE PRACTITIONER

## 2018-06-27 RX ORDER — ZOLMITRIPTAN 5 MG/1
TABLET, ORALLY DISINTEGRATING ORAL
Qty: 9 TAB | Refills: 5 | Status: SHIPPED | OUTPATIENT
Start: 2018-06-27 | End: 2018-12-21

## 2018-06-27 RX ORDER — TOPIRAMATE 25 MG/1
TABLET ORAL
Qty: 90 TAB | Refills: 2 | Status: SHIPPED | OUTPATIENT
Start: 2018-06-27 | End: 2018-12-21

## 2018-06-27 RX ORDER — SUMATRIPTAN 100 MG/1
100 TABLET, FILM COATED ORAL
Status: ON HOLD | COMMUNITY
End: 2018-07-09

## 2018-06-27 ASSESSMENT — ENCOUNTER SYMPTOMS
NAUSEA: 0
DOUBLE VISION: 0
DIARRHEA: 0
SEIZURES: 0
ABDOMINAL PAIN: 1
SINUS PAIN: 1
SORE THROAT: 0
CONSTITUTIONAL NEGATIVE: 1
HEADACHES: 1
NERVOUS/ANXIOUS: 0
COUGH: 0
DEPRESSION: 0
VOMITING: 1
MUSCULOSKELETAL NEGATIVE: 1
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
## 2018-07-09 ENCOUNTER — HOSPITAL ENCOUNTER (OUTPATIENT)
Facility: MEDICAL CENTER | Age: 28
End: 2018-07-09
Attending: SPECIALIST | Admitting: SPECIALIST
Payer: MEDICAID

## 2018-07-09 VITALS
HEART RATE: 85 BPM | OXYGEN SATURATION: 97 % | TEMPERATURE: 98.1 F | BODY MASS INDEX: 27.06 KG/M2 | HEIGHT: 64 IN | WEIGHT: 158.51 LBS | RESPIRATION RATE: 18 BRPM

## 2018-07-09 DIAGNOSIS — G89.18 POST-OP PAIN: Primary | ICD-10-CM

## 2018-07-09 PROBLEM — N94.10 FEMALE DYSPAREUNIA: Status: ACTIVE | Noted: 2018-07-09

## 2018-07-09 PROBLEM — R10.2 PELVIC PAIN IN FEMALE: Status: ACTIVE | Noted: 2018-07-09

## 2018-07-09 PROCEDURE — A9270 NON-COVERED ITEM OR SERVICE: HCPCS

## 2018-07-09 PROCEDURE — 160035 HCHG PACU - 1ST 60 MINS PHASE I: Performed by: SPECIALIST

## 2018-07-09 PROCEDURE — 500886 HCHG PACK, LAPAROSCOPY: Performed by: SPECIALIST

## 2018-07-09 PROCEDURE — 160041 HCHG SURGERY MINUTES - EA ADDL 1 MIN LEVEL 4: Performed by: SPECIALIST

## 2018-07-09 PROCEDURE — 160009 HCHG ANES TIME/MIN: Performed by: SPECIALIST

## 2018-07-09 PROCEDURE — A4338 INDWELLING CATHETER LATEX: HCPCS | Performed by: SPECIALIST

## 2018-07-09 PROCEDURE — 700111 HCHG RX REV CODE 636 W/ 250 OVERRIDE (IP)

## 2018-07-09 PROCEDURE — 160029 HCHG SURGERY MINUTES - 1ST 30 MINS LEVEL 4: Performed by: SPECIALIST

## 2018-07-09 PROCEDURE — 700102 HCHG RX REV CODE 250 W/ 637 OVERRIDE(OP)

## 2018-07-09 PROCEDURE — 500594 HCHG GELPORT: Performed by: SPECIALIST

## 2018-07-09 PROCEDURE — 160048 HCHG OR STATISTICAL LEVEL 1-5: Performed by: SPECIALIST

## 2018-07-09 PROCEDURE — 501579 HCHG TROCAR, STEP 5MM: Performed by: SPECIALIST

## 2018-07-09 PROCEDURE — 500854 HCHG NEEDLE, INSUFFLATION FOR STEP: Performed by: SPECIALIST

## 2018-07-09 PROCEDURE — 700101 HCHG RX REV CODE 250

## 2018-07-09 PROCEDURE — 160036 HCHG PACU - EA ADDL 30 MINS PHASE I: Performed by: SPECIALIST

## 2018-07-09 PROCEDURE — 160002 HCHG RECOVERY MINUTES (STAT): Performed by: SPECIALIST

## 2018-07-09 PROCEDURE — 501577 HCHG TROCAR, STEP 11MM: Performed by: SPECIALIST

## 2018-07-09 PROCEDURE — 501838 HCHG SUTURE GENERAL: Performed by: SPECIALIST

## 2018-07-09 PROCEDURE — 500002 HCHG ADHESIVE, DERMABOND: Performed by: SPECIALIST

## 2018-07-09 RX ORDER — ONDANSETRON 2 MG/ML
INJECTION INTRAMUSCULAR; INTRAVENOUS
Status: COMPLETED
Start: 2018-07-09 | End: 2018-07-09

## 2018-07-09 RX ORDER — OXYCODONE HYDROCHLORIDE AND ACETAMINOPHEN 5; 325 MG/1; MG/1
1 TABLET ORAL EVERY 6 HOURS PRN
Qty: 28 TAB | Refills: 0 | Status: SHIPPED | OUTPATIENT
Start: 2018-07-09 | End: 2018-07-16

## 2018-07-09 RX ORDER — IBUPROFEN 800 MG/1
800 TABLET ORAL EVERY 8 HOURS PRN
Qty: 30 TAB | Refills: 0 | Status: SHIPPED | OUTPATIENT
Start: 2018-07-09 | End: 2018-12-21 | Stop reason: SDUPTHER

## 2018-07-09 RX ORDER — ONDANSETRON 2 MG/ML
INJECTION INTRAMUSCULAR; INTRAVENOUS
Status: DISCONTINUED
Start: 2018-07-09 | End: 2018-07-09 | Stop reason: HOSPADM

## 2018-07-09 RX ORDER — SCOLOPAMINE TRANSDERMAL SYSTEM 1 MG/1
PATCH, EXTENDED RELEASE TRANSDERMAL
Status: DISCONTINUED
Start: 2018-07-09 | End: 2018-07-09 | Stop reason: HOSPADM

## 2018-07-09 RX ORDER — ONDANSETRON 2 MG/ML
4 INJECTION INTRAMUSCULAR; INTRAVENOUS EVERY 6 HOURS PRN
Status: DISCONTINUED | OUTPATIENT
Start: 2018-07-09 | End: 2018-07-09 | Stop reason: HOSPADM

## 2018-07-09 RX ORDER — IBUPROFEN 400 MG/1
800 TABLET ORAL EVERY 8 HOURS PRN
Status: DISCONTINUED | OUTPATIENT
Start: 2018-07-09 | End: 2018-07-09 | Stop reason: HOSPADM

## 2018-07-09 RX ORDER — SODIUM CHLORIDE, SODIUM LACTATE, POTASSIUM CHLORIDE, CALCIUM CHLORIDE 600; 310; 30; 20 MG/100ML; MG/100ML; MG/100ML; MG/100ML
INJECTION, SOLUTION INTRAVENOUS CONTINUOUS
Status: DISCONTINUED | OUTPATIENT
Start: 2018-07-09 | End: 2018-07-09 | Stop reason: HOSPADM

## 2018-07-09 RX ORDER — ONDANSETRON 2 MG/ML
4 INJECTION INTRAMUSCULAR; INTRAVENOUS ONCE
Status: COMPLETED | OUTPATIENT
Start: 2018-07-09 | End: 2018-07-09

## 2018-07-09 RX ORDER — SIMETHICONE 80 MG
80 TABLET,CHEWABLE ORAL EVERY 8 HOURS PRN
Status: DISCONTINUED | OUTPATIENT
Start: 2018-07-09 | End: 2018-07-09 | Stop reason: HOSPADM

## 2018-07-09 RX ORDER — OXYCODONE HCL 5 MG/5 ML
SOLUTION, ORAL ORAL
Status: COMPLETED
Start: 2018-07-09 | End: 2018-07-09

## 2018-07-09 RX ORDER — OXYCODONE HYDROCHLORIDE AND ACETAMINOPHEN 5; 325 MG/1; MG/1
1-2 TABLET ORAL EVERY 4 HOURS PRN
Status: DISCONTINUED | OUTPATIENT
Start: 2018-07-09 | End: 2018-07-09 | Stop reason: HOSPADM

## 2018-07-09 RX ORDER — SCOLOPAMINE TRANSDERMAL SYSTEM 1 MG/1
1 PATCH, EXTENDED RELEASE TRANSDERMAL ONCE
Status: DISCONTINUED | OUTPATIENT
Start: 2018-07-09 | End: 2018-07-09 | Stop reason: HOSPADM

## 2018-07-09 RX ADMIN — SCOPOLAMINE 1 PATCH: 1 PATCH, EXTENDED RELEASE TRANSDERMAL at 10:00

## 2018-07-09 RX ADMIN — ONDANSETRON 4 MG: 2 INJECTION INTRAMUSCULAR; INTRAVENOUS at 10:00

## 2018-07-09 RX ADMIN — SCOLOPAMINE TRANSDERMAL SYSTEM 1 PATCH: 1 PATCH, EXTENDED RELEASE TRANSDERMAL at 10:00

## 2018-07-09 RX ADMIN — SODIUM CHLORIDE, SODIUM LACTATE, POTASSIUM CHLORIDE, CALCIUM CHLORIDE: 600; 310; 30; 20 INJECTION, SOLUTION INTRAVENOUS at 09:40

## 2018-07-09 RX ADMIN — OXYCODONE HYDROCHLORIDE 10 MG: 5 SOLUTION ORAL at 13:18

## 2018-07-09 RX ADMIN — ONDANSETRON 4 MG: 2 INJECTION INTRAMUSCULAR; INTRAVENOUS at 13:28

## 2018-07-09 ASSESSMENT — PAIN SCALES - GENERAL
PAINLEVEL_OUTOF10: 3
PAINLEVEL_OUTOF10: 2
PAINLEVEL_OUTOF10: 0
PAINLEVEL_OUTOF10: 0
PAINLEVEL_OUTOF10: 4
PAINLEVEL_OUTOF10: 0

## 2018-07-09 NOTE — OP REPORT
DATE OF SERVICE:  07/09/2018    PREOPERATIVE DIAGNOSES:  Pelvic pain and dyspareunia following previous   laparoscopic assisted vaginal hysterectomy, performed last year, and   postoperative intraperitoneal pelvic adhesions following previous surgeries   suspected.    POSTOPERATIVE DIAGNOSES:  1.  Pelvic pain and dyspareunia following previous laparoscopic-assisted   vaginal hysterectomy.  2.  Intraperitoneal pelvic adhesions.    SURGEON:  Francisco Cordon MD    ANESTHESIA:  General endotracheal tube anesthesia.    ANESTHESIOLOGIST:  Riky Bundy MD    FINDINGS:  Speculum exam under anesthesia was performed and reveals absence of   the cervix and the vaginal cuff is well visualized during speculum exam under   anesthesia and was found to be intact and nicely healed.  There are no vulvar   or vaginal lesions and there is no vaginal discharge.  At laparoscopy,   absence of the uterus is noted.  Also, both fallopian tubes are found to be   absent.  There are adhesions of the omentum to the left side of the pelvis   just adjacent to the left aspect of the vagina.  Also, there are adhesions of   the omentum to the left side of the cul-de-sac as well.  The cul-de-sac other   than for this adhesion is normal.  Both ovaries are normal and both ovarian   fossae are normal.  The appendix is seen and is normal.  The liver edge is   seen and is normal.    SPECIMENS:  None.    COMPLICATIONS:  None.    ESTIMATED BLOOD LOSS:  Less than 20 mL.    DESCRIPTION OF PROCEDURE:  After appropriate consents have been obtained, the   patient was taken to the operating room and given general anesthesia.  She is   prepped and draped in the dorsal lithotomy position and the bladder is emptied   with a catheter.  Speculum exam is performed and reveals absence of the   cervix and the vaginal cuff was well visualized and found to be nicely healed   and intact.  There are no vulvar or vaginal lesions and there is no vaginal   discharge.  A  sponge stick was placed in the vaginal vault and left in place   and the speculum was then removed.  Next, the 's gloves were changed   and attention was directed to the abdomen where a small (approximately 1 cm)   horizontal infraumbilical incision made with a scalpel.  A Veress needle was   advanced through this incision into the peritoneal cavity and proper placement   in the peritoneal cavity was verified with palmar hanging drop technique.    The peritoneal cavity was then insufflated with approximately 2-3 liters of   carbon dioxide gas.  The Veress needle was removed and a 5 mm port was   introduced through the infraumbilical incision into the peritoneal cavity   utilizing the VersaStep trocar system.  The central portion of this port was   removed and a 5 mm 0-degree laparoscope was inserted through the remaining   sleeve and proper entry and the peritoneal cavity was verified visually with   laparoscope.  The patient was placed in Trendelenburg position.  A 5 mm   suprapubic port was placed under direct laparoscopic visualization, utilizing   the VersaStep trocar system.  A blunt probe was placed through this port.  The   patient was placed in Trendelenburg position.  The vaginal cuff is   manipulated with the vaginal sponge stick and the vaginal cuff is correctly   identified.  There are adhesions of the omentum to the left aspect of the   vaginal cuff (adhesions of the omentum to the left side of the vaginal cuff)   and these adhesions were thoroughly cauterized with the monopolar cautery and   cut with endoscopic scissors.  Also there is adhesion of the omentum to the   left side of the cul-de-sac and this adhesion was thoroughly cauterized with   monopolar cautery and lysed.  Findings are as noted above.  Excellent   hemostasis was observed.  The patient was taken out of Trendelenburg position.    Air is allowed to escape the peritoneal cavity as the laparoscope was   removed.  Both ports were  removed.  Skin incisions were reapproximated with   placement of multiple interrupted buried sutures of 4-0 Monocryl placed in the   dermis.  The vaginal sponge stick was removed.  The procedure was terminated   with final lap and needle counts reported to be correct x2 at the end of the   procedure.  Patient tolerated the procedure well and sent to postanesthesia   recovery in stable condition.       ____________________________________     LOULOU DAVILA MD    MED / N[facility]    DD:  07/09/2018 13:01:13  DT:  07/09/2018 13:12:45    D#:  [id number]  Job#:  [id number]    cc: AG JALLOH MD

## 2018-07-09 NOTE — DISCHARGE INSTRUCTIONS
ACTIVITY: Rest and take it easy for the first 24 hours.  A responsible adult is recommended to remain with you during that time.  It is normal to feel sleepy.  We encourage you to not do anything that requires balance, judgment or coordination.    MILD FLU-LIKE SYMPTOMS ARE NORMAL. YOU MAY EXPERIENCE GENERALIZED MUSCLE ACHES, THROAT IRRITATION, HEADACHE AND/OR SOME NAUSEA.    FOR 24 HOURS DO NOT:  Drive, operate machinery or run household appliances.  Drink beer or alcoholic beverages.   Make important decisions or sign legal documents.    SPECIAL INSTRUCTIONS: follow printed post-operative sheet    DIET: To avoid nausea, slowly advance diet as tolerated, avoiding spicy or greasy foods for the first day.  Add more substantial food to your diet according to your physician's instructions. INCREASE FLUIDS AND FIBER TO AVOID CONSTIPATION.      FOLLOW-UP APPOINTMENT:  A follow-up appointment should be arranged with your doctor in 2 weeks; call to schedule.    You should CALL YOUR PHYSICIAN if you develop:  Fever greater than 101 degrees F.  Pain not relieved by medication, or persistent nausea or vomiting.  Excessive bleeding (blood soaking through dressing) or unexpected drainage from the wound.  Extreme redness or swelling around the incision site, drainage of pus or foul smelling drainage.  Inability to urinate or empty your bladder within 8 hours.  Problems with breathing or chest pain.    You should call 911 if you develop problems with breathing or chest pain.  If you are unable to contact your doctor or surgical center, you should go to the nearest emergency room or urgent care center.  Physician's telephone #: [phone]    If any questions arise, call your doctor.  If your doctor is not available, please feel free to call the Surgical Center at [phone].  The Center is open Monday through Friday from 7AM to 7PM.  You can also call the HEALTH HOTLINE open 24 hours/day, 7 days/week and speak to a nurse at  [phone], or toll free at [phone].    A registered nurse may call you a few days after your surgery to see how you are doing after your procedure.    MEDICATIONS: Resume taking daily medication.  Take prescribed pain medication with food.  If no medication is prescribed, you may take non-aspirin pain medication if needed.  PAIN MEDICATION CAN BE VERY CONSTIPATING.  Take a stool softener or laxative such as senokot, pericolace, or milk of magnesia if needed.    Prescription given for Percocet and Motrin.  Last pain medication given at ____________.    If your physician has prescribed pain medication that includes Acetaminophen (Tylenol), do not take additional Acetaminophen (Tylenol) while taking the prescribed medication.    · Ensure safe storage of your medications in their original containers and out of the reach of others.  · Take unused medications to a Drug Enforcement Administration public disposal location (https://apps.Localocracyion.Walmoo.gov/pubdispsearch/)      Depression / Suicide Risk    As you are discharged from this [facility] facility, it is important to learn how to keep safe from harming yourself.    Recognize the warning signs:  · Abrupt changes in personality, positive or negative- including increase in energy   · Giving away possessions  · Change in eating patterns- significant weight changes-  positive or negative  · Change in sleeping patterns- unable to sleep or sleeping all the time   · Unwillingness or inability to communicate  · Depression  · Unusual sadness, discouragement and loneliness  · Talk of wanting to die  · Neglect of personal appearance   · Rebelliousness- reckless behavior  · Withdrawal from people/activities they love  · Confusion- inability to concentrate     If you or a loved one observes any of these behaviors or has concerns about self-harm, here's what you can do:  · Talk about it- your feelings and reasons for harming yourself  · Remove any means that you  might use to hurt yourself (examples: pills, rope, extension cords, firearm)  · Get professional help from the community (Mental Health, Substance Abuse, psychological counseling)  · Do not be alone:Call your Safe Contact- someone whom you trust who will be there for you.  · Call your local CRISIS HOTLINE 277-8425 or 008-098-4470  · Call your local Children's Mobile Crisis Response Team [place] (728) 049-3257 or www.Storypanda  · Call the toll free National Suicide Prevention Hotlines   · National Suicide Prevention Lifeline 622-436-ZQPN (5041)  · National Hope Line Network 800-SUICIDE (830-3136)

## 2018-07-09 NOTE — H&P
Corine Baugh          YOB: 1990  Date of today's surgery: Monday, July 9, 2018  Facility: [facility]    ID: The patient is a very pleasant 28-year-old multipara (para-2, and Corine has had 2 previous vaginal deliveries).    Chief Complaint: The patient complains of pelvic pain including dyspareunia.    History of Present Illness: The patient has been having complains of pelvic pain and dyspareunia.  She says she has a history of endometriosis.  She has had a hysterectomy.  I first saw her when she came to the office on May 21, 2018.  At that time she presented with complaints of pelvic pain and dyspareunia ever since her laparoscopic assisted vaginal hysterectomy.  Speculum exam at that time revealed that the vaginal cuff appeared normal and there was a small amount of vaginal discharge consistent with physiologic discharge and bimanual vaginal exam at that time revealed absence of the uterus and revealed that there was tenderness to palpation of the vaginal cuff and that there was diffuse pelvic tenderness.  Bimanual exam reveals no evidence of any adnexal masses either on the right or the left.  She is scheduled today to have laparoscopy, both diagnostic and if necessary and depending on findings at the time of laparoscopy, operative.  I have discussed with her and explained to her in detail and at length what diagnostic and operative laparoscopy is and what diagnostic and operative laparoscopy involves and I have discussed with her and explained to her in detail and at length the risks and benefits and alternatives of diagnostic and operative laparoscopy.  After our discussions and after answering her questions she told me that she very much wishes for us to proceed with laparoscopy, both diagnostic and if necessary and depending on findings at the time of laparoscopy, operative.    Past Medical History: The patient has chronic headaches and absence seizures and  asthma.    Past Surgical History: The patient had a laparoscopic assisted vaginal hysterectomy and 2017.  She had a laparoscopic bilateral tubal ligation previously.  She has had placement of PE tubes.  She has had tooth extraction.    Medications: The patient takes Topamax as treatment for her headaches.  She takes acyclovir.  She also takes Myrbetriq.     Allergies: The patient says that she is allergic to Betadine and that she is allergic to shellfish and that she is allergic to Demerol and that she is allergic to Biaxin and that she is allergic to latex and that she is allergic to Norco.  She says however that she has no problems with Percocet.    Social History: The patient denies smoking.  She occasionally consumes alcoholic beverages.  She denies the use of recreational drugs.    Review of Systems  General: The patient has occasional sweats chills.  Pulmonary: The patient denies any coughing, wheezing, chest pain, shortness of breath.  Cardiovascular: The patient denies any palpitations, dyspnea, chest pain.  Gastrointestinal: The patient denies any nausea, vomiting, diarrhea, constipation, hematochezia, melena, history of hepatitis, history of jaundice.  Genitourinary: The patient still has pelvic pain and dyspareunia.  Musculoskeletal: The patient says she has diffuse arthralgias and myalgias.   Neurological: The patient has daily headaches.      Physical Exam:   Vital Signs: The patient's vital signs are stable and she is afebrile.  General: The patient appears well developed and well nourished and relaxed and alert and comfortable and in no apparent distress.    HEENT :  Normo-cephalic, atraumatic, pupils equal, round, reactive to light and accommodation, extra ocular motions intact, pharynx clear; there is no thyromegaly. There is no cervical lymphadenopathy.  Chest: Heart regular rate and rhythm, with no murmurs or rubs or gallops; the lungs are clear to auscultation bilaterally.  Abdomen: The abdomen  is soft and flat and non-tender and non-distended. There is no hepatomegaly. There is no splenomegaly.   Pelvic: Speculum exam reveals absence of the cervix and reveals that the vaginal cuff appears normal and reveals no vulvar or vaginal lesions and bimanual exam reveals absence of the uterus and reveals that there is diffuse pelvic tenderness and reveals that there is tenderness to palpation of the vaginal cuff and reveals no evidence of any adnexal masses either on the right or the left.  Extremities: No clubbing or cyanosis or edema.   Neurological: non-focal.     Assessment:   Pelvic pain and dyspareunia following previous laparoscopic assisted vaginal hysterectomy, performed last year, and postoperative intraperitoneal pelvic adhesions following previous surgery is suspected.    Plan:   We will proceed today with laparoscopy, both diagnostic and if necessary and depending on findings at the time of laparoscopy, operative.  Please see above.            ________________________  Francisco Cordon M.D.

## 2018-07-09 NOTE — OR NURSING
1245 Arrives per cart to PACU, accompanied by OR staff. Patient is sleepy, oral airway in place, O2 on per nasal cannula, monitors applied.  Handoff report received from anesthesia and OR nursing personnel.     1257 Patient waking up some, oral airway removed intact. O2 remains on at 2 LPM.    1318 Oral pain medication given see mar. Pt states that she is ok taking oxycodone despite her allergy.  c/o being cold, Nikolas hugger warming system applied.    1323 Pt  brought to bedside, update given.  c/o some nausea, will give Zofran as ordered.  O2 DC'd, will continue to monitor SPO2 levels.    1350 Had 50cc emesis of slightly pink-tinged liquid.  Also, belched up a lot of air.    1357 Taking ice chips po without further nausea.  Up to bathroom with assist, voided good amount, dressing for discharge.    1418 Discharge instructions reviewed with patient and her . They verbalized understanding. Patient discharged per wheelchair with belongings and discharge instructions.  in accompaniment.

## 2018-07-10 RX ORDER — RIZATRIPTAN BENZOATE 10 MG/1
TABLET, ORALLY DISINTEGRATING ORAL
Qty: 9 TAB | Refills: 5 | Status: SHIPPED | OUTPATIENT
Start: 2018-07-10 | End: 2018-12-21

## 2018-07-16 ENCOUNTER — HOSPITAL ENCOUNTER (OUTPATIENT)
Dept: RADIOLOGY | Facility: MEDICAL CENTER | Age: 28
End: 2018-07-16
Attending: PHYSICIAN ASSISTANT
Payer: MEDICAID

## 2018-07-16 DIAGNOSIS — M54.2 CERVICALGIA: ICD-10-CM

## 2018-07-16 PROCEDURE — 72141 MRI NECK SPINE W/O DYE: CPT

## 2018-07-16 PROCEDURE — 72050 X-RAY EXAM NECK SPINE 4/5VWS: CPT

## 2018-08-06 ENCOUNTER — HOSPITAL ENCOUNTER (EMERGENCY)
Facility: MEDICAL CENTER | Age: 28
End: 2018-08-06
Attending: EMERGENCY MEDICINE
Payer: MEDICAID

## 2018-08-06 VITALS
RESPIRATION RATE: 16 BRPM | SYSTOLIC BLOOD PRESSURE: 114 MMHG | TEMPERATURE: 97.6 F | DIASTOLIC BLOOD PRESSURE: 81 MMHG | OXYGEN SATURATION: 99 % | BODY MASS INDEX: 26.84 KG/M2 | WEIGHT: 157.19 LBS | HEART RATE: 80 BPM | HEIGHT: 64 IN

## 2018-08-06 DIAGNOSIS — G43.709 CHRONIC MIGRAINE WITHOUT AURA WITHOUT STATUS MIGRAINOSUS, NOT INTRACTABLE: ICD-10-CM

## 2018-08-06 PROCEDURE — 96372 THER/PROPH/DIAG INJ SC/IM: CPT

## 2018-08-06 PROCEDURE — 99284 EMERGENCY DEPT VISIT MOD MDM: CPT

## 2018-08-06 PROCEDURE — 700111 HCHG RX REV CODE 636 W/ 250 OVERRIDE (IP): Performed by: EMERGENCY MEDICINE

## 2018-08-06 RX ORDER — DIPHENHYDRAMINE HYDROCHLORIDE 50 MG/ML
25 INJECTION INTRAMUSCULAR; INTRAVENOUS ONCE
Status: COMPLETED | OUTPATIENT
Start: 2018-08-06 | End: 2018-08-06

## 2018-08-06 RX ORDER — KETOROLAC TROMETHAMINE 30 MG/ML
30 INJECTION, SOLUTION INTRAMUSCULAR; INTRAVENOUS ONCE
Status: COMPLETED | OUTPATIENT
Start: 2018-08-06 | End: 2018-08-06

## 2018-08-06 RX ORDER — DEXAMETHASONE SODIUM PHOSPHATE 10 MG/ML
10 INJECTION, SOLUTION INTRAMUSCULAR; INTRAVENOUS ONCE
Status: COMPLETED | OUTPATIENT
Start: 2018-08-06 | End: 2018-08-06

## 2018-08-06 RX ADMIN — DIPHENHYDRAMINE HYDROCHLORIDE 25 MG: 50 INJECTION, SOLUTION INTRAMUSCULAR; INTRAVENOUS at 14:40

## 2018-08-06 RX ADMIN — PROCHLORPERAZINE EDISYLATE 10 MG: 5 INJECTION INTRAMUSCULAR; INTRAVENOUS at 14:43

## 2018-08-06 RX ADMIN — KETOROLAC TROMETHAMINE 30 MG: 30 INJECTION, SOLUTION INTRAMUSCULAR at 14:42

## 2018-08-06 RX ADMIN — DEXAMETHASONE SODIUM PHOSPHATE 10 MG: 10 INJECTION, SOLUTION INTRAMUSCULAR; INTRAVENOUS at 14:39

## 2018-08-06 ASSESSMENT — LIFESTYLE VARIABLES: DO YOU DRINK ALCOHOL: NO

## 2018-08-06 NOTE — ED PROVIDER NOTES
ED Provider Note    Scribed for Arya Mullen M.D. by Lauren Plasencia. 8/6/2018  2:07 PM    CHIEF COMPLAINT  Chief Complaint   Patient presents with   • Migraine   • Fatigue       HPI  Corine Baugh is a 28 y.o. female who presents to the Emergency Room for evaluation of a migraine onset two days ago with waking up. She took Imitrex within 5 minutes of the onset of her symptoms with no relief. Her migraine is consistent with previous migraine headaches with the exception of the duration,   Which is longer, and the fact that she usually has much more visual disturbance than this, and has had less than typical. . Patient has associated fatigue, lightheadedness, light and sound sensitivity, nausea, vomiting and stiffness to her head and shoulders. She denies vision changes. Additionally, patient reports recent increase in her Topamax from three days ago. Patient also reports concern that her symptoms may be related to recent  Minor infection to her C section  Incision,   Motor the increase in her Topamax, which was prescribed to her to help with her headaches.      REVIEW OF SYSTEMS  See HPI for further details. E.       PAST MEDICAL HISTORY   has a past medical history of Asthma; Bowel habit changes; Depression; Gynecological disorder (2017); Heart burn; Herpes genitalia; Migraine aura without headache; Pain (2017); Seizure disorder (HCC); Snoring; Urinary bladder disorder; and Urinary incontinence.      SOCIAL HISTORY  Social History     Social History Main Topics   • Smoking status: Former Smoker     Packs/day: 0.10     Years: 1.00     Types: Cigarettes     Quit date: 1/1/2010   • Smokeless tobacco: Never Used   • Alcohol use Yes      Comment: 1 glass wine every 2 weeks   • Drug use: No      Comment: denies   • Sexual activity: No       SURGICAL HISTORY   has a past surgical history that includes gyn surgery (2014); vaginal hysterectomy scope total (Bilateral, 4/21/2017); cystoscopy (4/21/2017); other;  "pelviscopy (N/A, 7/9/2018); exam under anesthesia (N/A, 7/9/2018); and laparoscopic lysis of adhesions (Left, 7/9/2018).      CURRENT MEDICATIONS  Home Medications     Reviewed by Nancy Craig R.N. (Registered Nurse) on 08/06/18 at 1023  Med List Status: Partial   Medication Last Dose Status   acetaminophen (TYLENOL) 500 MG Tab prn Active   acyclovir (ZOVIRAX) 400 MG tablet  Active   albuterol (VENTOLIN HFA) 108 (90 BASE) MCG/ACT Aero Soln inhalation aerosol prn Active   ibuprofen (MOTRIN) 200 MG Tab  Active   ibuprofen (MOTRIN) 800 MG Tab  Active   Mirabegron ER (MYRBETRIQ) 25 MG TABLET SR 24 HR 6/26/2018 Active   rizatriptan (MAXALT-MLT) 10 MG disintegrating tablet  Active   topiramate (TOPAMAX) 25 MG Tab taking Active   zolmitriptan (ZOMIG-ZMT) 5 MG disintegrating tablet  Active                ALLERGIES  Allergies   Allergen Reactions   • Demerol      \"I go crazy.\"   • Biaxin [Clarithromycin] Hives   • Bee    • Betadine [Povidone Iodine]      Told by MD not to use   • Food Nausea and Swelling     Avocado, banana, eggplant, mushroom, lavander  Lip swelling, mouth tingling itchy throat    • Iodine      Told by MD not to use   • Latex Rash     Spreads beyond contact site   • Macrobid [Kdc:Red Dye+Yellow Dye+Nitrofurantoin+Brilliant Blue Fcf] Swelling   • Norco [Hydrocodone-Acetaminophen] Vomiting     No vicodin-percocet ok   • Shellfish Allergy Anaphylaxis   • Singulair      Felt \"mean\"       PHYSICAL EXAM  VITAL SIGNS: /83   Pulse 83   Temp 36.4 °C (97.6 °F) (Temporal)   Resp 16   Ht 1.626 m (5' 4\")   Wt 71.3 kg (157 lb 3 oz)   LMP 04/04/2017   SpO2 99%   BMI 26.98 kg/m²   Pulse ox interpretation: I interpret this pulse ox as normal.  Constitutional: Alert in no apparent distress.  HENT: Normocephalic, Atraumatic, Bilateral external ears normal. Nose normal.   Eyes: Conjunctiva normal, non-icteric.   Heart: Regular rate and rythm, no murmurs.    Lungs: Clear to auscultation bilaterally.  Skin: " Warm, Dry, No erythema, No rash. Well healed low transverse C section incision.    Neurologic: Alert, Grossly non-focal.   Psychiatric: Affect   Anxious, Judgment normal, Mood normal, Appears appropriate and not intoxicated.         COURSE & MEDICAL DECISION MAKING  The patient's VS, Nurses notes reviewed. (See chart for details)    2:07 PM Patient seen and examined at bedside.   This headache fits her typical migraine syndrome, but was lasting somewhat longer, but less severe visual disturbance. Patient will be treated with Toradol 30 mg, Compazine 10 mg, Benadryl 25 mg and Decadron 10 mg for her symptoms. Patient informed that   There is no way to tell if her symptoms are related to her recent dose change in Topamax. She was advised to skip her dose of Topamax tonight and continue taking her usual dose of Topamax tomorrow,  As long as her headache has improved. She agreed to discharge home following intervention.  If she notices a pattern of new or different headaches, that might be attributable to new medications, a single episode is difficult to interpret.  Further, the patient's Topamax was prescribed to help with her  Headaches,  Which may explain the mild unusual visual disturbance, but does not explain the longer than usual duration.  I recommended that she follow up with her prescribing physician. The patient will return for new or worsening symptoms and is stable at the time of discharge.    The patient is referred to a primary physician for blood pressure management, diabetic screening, and for all other preventative health concerns.      DISPOSITION:  Patient will be discharged home in stable condition.      FOLLOW UP:  [facility]  [address] [address]  [phone]    Schedule an appointment as soon as possible for a visit        OUTPATIENT MEDICATIONS:  Discharge Medication List as of 8/6/2018  2:50 PM            FINAL IMPRESSION  1. Chronic migraine without aura  without status migrainosus, not intractable          Lauren STAFFORD (Scribe), am scribing for, and in the presence of, Arya Mullen M.D..  Electronically signed by: Lauren Plasencia (Scribe), 8/6/2018  I, Arya Mullen M.D. personally performed the services described in this documentation, as scribed by Lauren Plasencia in my presence, and it is both accurate and complete.    The note accurately reflects work and decisions made by me.  Arya Mullen  8/6/2018  9:42 PM

## 2018-08-06 NOTE — ED TRIAGE NOTES
"Chief Complaint   Patient presents with   • Migraine   • Fatigue     Pt having a migraine since Saturday. Pt having sensitivity to light, nausea, vomiting and feeling like she is going to pass out. Pt took her Imitrex and normally it takes care of it but this one has lasted 3 days.   Pt recently started Topamax for her headaches and she increased the dose on Friday.   /83   Pulse 83   Temp 36.4 °C (97.6 °F) (Temporal)   Resp 16   Ht 1.626 m (5' 4\")   Wt 71.3 kg (157 lb 3 oz)   LMP 04/04/2017   SpO2 99%   BMI 26.98 kg/m²   Pt placed back in lobby, educated on triage process, and told to inform staff of any change in condition.     "

## 2018-12-21 ENCOUNTER — OFFICE VISIT (OUTPATIENT)
Dept: MEDICAL GROUP | Facility: MEDICAL CENTER | Age: 28
End: 2018-12-21
Attending: INTERNAL MEDICINE
Payer: MEDICAID

## 2018-12-21 VITALS
HEART RATE: 82 BPM | SYSTOLIC BLOOD PRESSURE: 118 MMHG | BODY MASS INDEX: 24.75 KG/M2 | WEIGHT: 145 LBS | HEIGHT: 64 IN | DIASTOLIC BLOOD PRESSURE: 80 MMHG | RESPIRATION RATE: 16 BRPM | TEMPERATURE: 98.2 F

## 2018-12-21 DIAGNOSIS — Z86.19 HISTORY OF HERPES GENITALIS: ICD-10-CM

## 2018-12-21 DIAGNOSIS — S93.422D SPRAIN OF DELTOID LIGAMENT OF LEFT ANKLE, SUBSEQUENT ENCOUNTER: ICD-10-CM

## 2018-12-21 DIAGNOSIS — F31.9 BIPOLAR DEPRESSION (HCC): ICD-10-CM

## 2018-12-21 DIAGNOSIS — G44.221 CHRONIC TENSION-TYPE HEADACHE, INTRACTABLE: ICD-10-CM

## 2018-12-21 DIAGNOSIS — N80.9 ENDOMETRIOSIS: ICD-10-CM

## 2018-12-21 DIAGNOSIS — R06.01 ORTHOPNEA: ICD-10-CM

## 2018-12-21 DIAGNOSIS — F14.11 COCAINE ABUSE IN REMISSION (HCC): ICD-10-CM

## 2018-12-21 DIAGNOSIS — R06.83 SNORING: ICD-10-CM

## 2018-12-21 DIAGNOSIS — F41.9 ANXIETY: ICD-10-CM

## 2018-12-21 DIAGNOSIS — J45.20 MILD INTERMITTENT ASTHMA WITHOUT COMPLICATION: ICD-10-CM

## 2018-12-21 PROBLEM — M26.09 MICROGNATHIA: Status: ACTIVE | Noted: 2018-12-21

## 2018-12-21 PROBLEM — G44.229 CHRONIC TENSION HEADACHE: Status: ACTIVE | Noted: 2018-12-21

## 2018-12-21 PROBLEM — S93.422A SPRAIN OF DELTOID LIGAMENT OF LEFT ANKLE: Status: ACTIVE | Noted: 2018-12-21

## 2018-12-21 PROBLEM — M26.09 MICROGNATHIA: Status: RESOLVED | Noted: 2018-12-21 | Resolved: 2018-12-21

## 2018-12-21 PROBLEM — R10.2 ADNEXAL TENDERNESS, RIGHT: Status: RESOLVED | Noted: 2017-04-21 | Resolved: 2018-12-21

## 2018-12-21 PROBLEM — N94.10 FEMALE DYSPAREUNIA: Status: RESOLVED | Noted: 2018-07-09 | Resolved: 2018-12-21

## 2018-12-21 PROCEDURE — 99203 OFFICE O/P NEW LOW 30 MIN: CPT | Performed by: INTERNAL MEDICINE

## 2018-12-21 PROCEDURE — 99204 OFFICE O/P NEW MOD 45 MIN: CPT | Performed by: INTERNAL MEDICINE

## 2018-12-21 RX ORDER — ACYCLOVIR 400 MG/1
400 TABLET ORAL 2 TIMES DAILY
Qty: 60 TAB | Refills: 5 | Status: SHIPPED | OUTPATIENT
Start: 2018-12-21 | End: 2019-04-19 | Stop reason: SDUPTHER

## 2018-12-21 RX ORDER — BUTALBITAL, ACETAMINOPHEN AND CAFFEINE 50; 325; 40 MG/1; MG/1; MG/1
1 TABLET ORAL EVERY 4 HOURS PRN
COMMUNITY
End: 2019-02-14 | Stop reason: SDUPTHER

## 2018-12-21 RX ORDER — ALBUTEROL SULFATE 90 UG/1
2 AEROSOL, METERED RESPIRATORY (INHALATION) EVERY 6 HOURS PRN
Qty: 8.5 G | Refills: 5 | Status: SHIPPED | OUTPATIENT
Start: 2018-12-21 | End: 2019-04-26 | Stop reason: SDUPTHER

## 2018-12-21 RX ORDER — ACETAMINOPHEN 500 MG
1000 TABLET ORAL 3 TIMES DAILY PRN
Status: SHIPPED | DISCHARGE
Start: 2018-12-21 | End: 2019-06-24 | Stop reason: SDUPTHER

## 2018-12-21 RX ORDER — IBUPROFEN 800 MG/1
800 TABLET ORAL EVERY 8 HOURS PRN
Qty: 90 TAB | Refills: 3 | Status: SHIPPED | OUTPATIENT
Start: 2018-12-21 | End: 2019-05-27 | Stop reason: SDUPTHER

## 2018-12-21 ASSESSMENT — PAIN SCALES - GENERAL: PAINLEVEL: 6=MODERATE PAIN

## 2018-12-22 NOTE — ASSESSMENT & PLAN NOTE
Reports struggling with depression for most of her adult life.  Has been in therapy since she was a child although not recently and she strongly declines a referral today.  Was adopted, both bio parents with drug issues.  Last medication was lamictal 100 mg BID 3 years ago.  Reports it was helpful for her as a teenager but the last time she tried it, it didn't work.  Has not seen a psychiatrist for years.  Reports numerous medication sensitivities and difficulty finding a medication that helped her depression in the past. Cannot remember the names of most of the meds she has tried but states she had a very bad reaction to seroquel.  Is reluctant to start something new but knows she has to at this point.  States her marriage is in trouble due to her depression.  Denies Suicidal ideation.  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  Reports lack of motivation, no sex drive, easy tearfulness, anhedonia.  States her last manic episode was about 5 years ago.

## 2018-12-24 PROBLEM — A60.00 GENITAL HERPES: Status: ACTIVE | Noted: 2018-12-24

## 2018-12-24 PROBLEM — Z86.19 HISTORY OF HERPES GENITALIS: Status: ACTIVE | Noted: 2018-12-24

## 2018-12-24 PROBLEM — F14.11 COCAINE ABUSE IN REMISSION (HCC): Status: ACTIVE | Noted: 2018-12-24

## 2018-12-24 PROBLEM — J45.20 MILD INTERMITTENT ASTHMA: Status: ACTIVE | Noted: 2018-12-24

## 2018-12-24 NOTE — ASSESSMENT & PLAN NOTE
Used intranasal cocaine regularly throughout her late teens/early 20's.  Has been in rehab twice.  Reports sustained sobriety.

## 2018-12-24 NOTE — ASSESSMENT & PLAN NOTE
She is s/p hysterectomy and recent repeat ex lap with removal of more endometriosis tissue through her gyn.  She has chronic pelvic pain.  Is currently taking ibuprofen 800 mg TID and tylenol 1500 mg TID on a nearly daily basis.

## 2018-12-24 NOTE — ASSESSMENT & PLAN NOTE
She reports chronic headaches, is currently followed by [facility].  Takes 1500 mg tylenol TID and 800 mg ibuprofen TID to help control.  Reports being told about medication overuse headaches in the past so she stopped tylenol and ibuprofen for 3 months without improvement in her headaches.

## 2018-12-24 NOTE — PROGRESS NOTES
Corine Baugh is a 28 y.o. female here for worsening depression, ankle sprain, chronic medical issues as below, est care  HPI:  Previous PCP was Marilyn Moore  Bipolar depression ([facility]) ([facility])  Reports struggling with depression for most of her adult life.  Has been in therapy since she was a child although not recently and she strongly declines a referral today.  Was adopted, both bio parents with drug issues.  Last medication was lamictal 100 mg BID 3 years ago.  Reports it was helpful for her as a teenager but the last time she tried it, it didn't work.  Has not seen a psychiatrist for years.  Reports numerous medication sensitivities and difficulty finding a medication that helped her depression in the past. Cannot remember the names of most of the meds she has tried but states she had a very bad reaction to seroquel.  Is reluctant to start something new but knows she has to at this point.  States her marriage is in trouble due to her depression.  Denies Suicidal ideation.  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  Reports lack of motivation, no sex drive, easy tearfulness, anhedonia.  States her last manic episode was about 5 years ago.      Sprain of deltoid ligament of left ankle  Reports falling down the stairs about two weeks ago and spraining her left ankle.  States she tripped while taking her puppy out in the middle of the night.  Has injured this ankle previously.  Initially it was bruised and swollen but she could bear weight on it.  She has been wrapping it with an ACE wrap and reports improvement in the swelling and pain although it is  along the outside part of her ankle.      Snoring  Reports loud snoring when she is sleeping and that her  tells her she stops breathing routinely.  She has a regressed lower jaw and was supposed to have it fixed surgically at age 17 but never  did.  She has seen a maxillofacial surgeon since then but states to get the surgery approved by insurance, they needed proof of medical necessity.  She was supposed to have a sleep study done due to concern for STEFANO that could be corrected if she were to get jaw surgery however states her specialist never placed the referral.  Reports fatigue, daytime sleepiness, chronic headaches.    Endometriosis  She is s/p hysterectomy and recent repeat ex lap with removal of more endometriosis tissue through her gyn.  She has chronic pelvic pain.  Is currently taking ibuprofen 800 mg TID and tylenol 1500 mg TID on a nearly daily basis.    Chronic tension headache  She reports chronic headaches, is currently followed by [facility].  Takes 1500 mg tylenol TID and 800 mg ibuprofen TID to help control.  Reports being told about medication overuse headaches in the past so she stopped tylenol and ibuprofen for 3 months without improvement in her headaches.      History of herpes genitalis  She takes acyclovir 400 mg BID for phrophylaxis.  Denies any recent outbreaks.    Cocaine abuse in remission ([facility])  Used intranasal cocaine regularly throughout her late teens/early 20's.  Has been in rehab twice.  Reports sustained sobriety.    Anxiety  Reports worsening anxiety along with her depression.  Is not currently on any medications or seeing a therapist.      Mild intermittent asthma  Denies recent flares.  Requesting refill of her albuterol inhaler which she uses occasionally.    Current medicines (including changes today)  Current Outpatient Prescriptions   Medication Sig Dispense Refill   • acetaminophen/caffeine/butalbital 325-40-50 mg (FIORICET) -40 MG Tab Take 1 Tab by mouth every four hours as needed for Headache.     • acetaminophen (TYLENOL) 500 MG Tab Take 2 Tabs by mouth 3 times a day as needed.     • albuterol (VENTOLIN HFA) 108 (90 Base) MCG/ACT Aero Soln inhalation aerosol Inhale 2 Puffs by mouth every 6 hours as  "needed for Shortness of Breath. 8.5 g 5   • ibuprofen (MOTRIN) 800 MG Tab Take 1 Tab by mouth every 8 hours as needed for Moderate Pain. 90 Tab 3   • acyclovir (ZOVIRAX) 400 MG tablet Take 1 Tab by mouth 2 times a day. 60 Tab 5     No current facility-administered medications for this visit.      She  has a past medical history of Asthma; Depression; Endometriosis; Heart burn; Herpes genitalia; Migraine aura without headache; and Seizure disorder ([facility]). She also has no past medical history of Seizure ([facility]).  She  has a past surgical history that includes gyn surgery (2014); vaginal hysterectomy scope total (Bilateral, 4/21/2017); cystoscopy (4/21/2017); pelviscopy (N/A, 7/9/2018); exam under anesthesia (N/A, 7/9/2018); laparoscopic lysis of adhesions (Left, 7/9/2018); and dental extraction(s).  Social History   Substance Use Topics   • Smoking status: Former Smoker     Packs/day: 0.10     Years: 1.00     Types: Cigarettes     Quit date: 1/1/2010   • Smokeless tobacco: Never Used   • Alcohol use 1.8 oz/week     3 Glasses of wine per week     Social History     Social History Narrative   • No narrative on file     Family History   Problem Relation Age of Onset   • Psychiatry Mother    • Alcohol/Drug Mother    • Diabetes Father         type 1   • Alcohol/Drug Father    • Cancer Neg Hx          ROS  As above in HPI  All other systems reviewed and are negative     Objective:     Blood pressure 118/80, pulse 82, temperature 36.8 °C (98.2 °F), temperature source Temporal, resp. rate 16, height 1.626 m (5' 4.02\"), weight 65.8 kg (145 lb), last menstrual period 04/04/2017, not currently breastfeeding. Body mass index is 24.88 kg/m².  Physical Exam:    Constitutional: Alert, no distress.  Skin: Warm, dry, good turgor, no rashes in visible areas.  Eye: Equal, round and reactive, conjunctiva clear, lids normal.  ENMT: Lips without lesions, good dentition, oropharynx clear, TM's clear bilaterally,small recessed lower jaw  Neck: " Trachea midline, no masses, no thyromegaly. No cervical or supraclavicular lymphadenopathy.  Respiratory: Unlabored respiratory effort, lungs clear to auscultation, no wheezes, no ronchi.  Cardiovascular: Regular rate and rhythm, no murmurs appreciated, no lower extremity edema.  Abdomen: Soft, non-tender, no masses, no hepatosplenomegaly.  Psych: Alert and oriented x3, depressed mood, flat affect, intermittently tearful throughout interview.  MSK: tender to palpation along lateral aspect/deltoid ligament of left ankle without significant swelling or bruising, able to fully bear weight on left foot, pain with dorsi- and plantar flexion of left ankle        Assessment and Plan:   The following treatment plan was discussed    1. Chronic tension-type headache, intractable  Daily taking quite large doses of ibuprofen and tylenol to control.  We discussed medication overuse headache, safe upper limit dosing of meds.  Advised to reduce tylenol intake to max 3000 mg daily.  She will cont f/u with neurology.    2. Anxiety  Uncontrolled, given complexity of her diagnosis, severe medication reactions in the past, will defer management to psychiatry.  Discussed this in detail with the patient and she is in agreement.  - REFERRAL TO PSYCHIATRY    3. Bipolar depression (HCC)  Uncontrolled, given complexity of her diagnosis, severe medication reactions in the past, will defer management to psychiatry.  Discussed this in detail with the patient and she is in agreement.  Denies SI.  - REFERRAL TO PSYCHIATRY    4. Endometriosis  With chronic pelvic pain, cont GYN follow up.  As discussed above, advised decreased tylenol use.    5. Sprain of deltoid ligament of left ankle, subsequent encounter  Improving.  Provided her with a soft lace up ankle brace today to wear for support when she is on her feet/active.  May take it off at rest.  May discontinue use in several weeks or sooner if symptoms improve  -soft lace up ankle brace  provided in clinic today    6. Orthopnea  She likely has STEFANO related to her jaw anatomy.  Will obtain a sleep study for further evaluation as she may be able to get jaw surgery if we can show it is causing STEFANO.  - REFERRAL TO SLEEP STUDIES    7. Snoring  - REFERRAL TO SLEEP STUDIES    8. History of herpes genitalis  Stable, cont current therapy  - acyclovir (ZOVIRAX) 400 MG tablet; Take 1 Tab by mouth 2 times a day.  Dispense: 60 Tab; Refill: 5    9. Cocaine abuse in remission (HCC)  Stable, cont to monitor    10. Mild intermittent asthma without complication  Stable, cont current meds which were refilled today.  - albuterol (VENTOLIN HFA) 108 (90 Base) MCG/ACT Aero Soln inhalation aerosol; Inhale 2 Puffs by mouth every 6 hours as needed for Shortness of Breath.  Dispense: 8.5 g; Refill: 5        Followup: Return in about 4 months (around 4/21/2019), or if symptoms worsen or fail to improve.

## 2018-12-24 NOTE — ASSESSMENT & PLAN NOTE
Reports worsening anxiety along with her depression.  Is not currently on any medications or seeing a therapist.

## 2018-12-24 NOTE — ASSESSMENT & PLAN NOTE
Reports loud snoring when she is sleeping and that her  tells her she stops breathing routinely.  She has a regressed lower jaw and was supposed to have it fixed surgically at age 17 but never did.  She has seen a maxillofacial surgeon since then but states to get the surgery approved by insurance, they needed proof of medical necessity.  She was supposed to have a sleep study done due to concern for STEFANO that could be corrected if she were to get jaw surgery however states her specialist never placed the referral.  Reports fatigue, daytime sleepiness, chronic headaches.

## 2018-12-24 NOTE — ASSESSMENT & PLAN NOTE
Reports falling down the stairs about two weeks ago and spraining her left ankle.  States she tripped while taking her puppy out in the middle of the night.  Has injured this ankle previously.  Initially it was bruised and swollen but she could bear weight on it.  She has been wrapping it with an ACE wrap and reports improvement in the swelling and pain although it is  along the outside part of her ankle.

## 2019-02-05 ENCOUNTER — APPOINTMENT (OUTPATIENT)
Dept: NEUROLOGY | Facility: MEDICAL CENTER | Age: 29
End: 2019-02-05
Payer: MEDICAID

## 2019-02-13 ENCOUNTER — PATIENT MESSAGE (OUTPATIENT)
Dept: MEDICAL GROUP | Facility: MEDICAL CENTER | Age: 29
End: 2019-02-13

## 2019-02-13 DIAGNOSIS — G43.709 CHRONIC MIGRAINE WITHOUT AURA WITHOUT STATUS MIGRAINOSUS, NOT INTRACTABLE: ICD-10-CM

## 2019-02-14 RX ORDER — BUTALBITAL, ACETAMINOPHEN AND CAFFEINE 50; 325; 40 MG/1; MG/1; MG/1
1 TABLET ORAL 2 TIMES DAILY PRN
Qty: 60 TAB | Refills: 2 | Status: SHIPPED | OUTPATIENT
Start: 2019-02-14 | End: 2019-04-19 | Stop reason: SDUPTHER

## 2019-04-18 ENCOUNTER — PATIENT MESSAGE (OUTPATIENT)
Dept: MEDICAL GROUP | Facility: MEDICAL CENTER | Age: 29
End: 2019-04-18

## 2019-04-18 DIAGNOSIS — G43.709 CHRONIC MIGRAINE WITHOUT AURA WITHOUT STATUS MIGRAINOSUS, NOT INTRACTABLE: ICD-10-CM

## 2019-04-18 DIAGNOSIS — Z86.19 HISTORY OF HERPES GENITALIS: ICD-10-CM

## 2019-04-19 RX ORDER — ACYCLOVIR 400 MG/1
400 TABLET ORAL 2 TIMES DAILY
Qty: 60 TAB | Refills: 5 | Status: SHIPPED | OUTPATIENT
Start: 2019-04-19 | End: 2019-07-03

## 2019-04-19 RX ORDER — BUTALBITAL, ACETAMINOPHEN AND CAFFEINE 50; 325; 40 MG/1; MG/1; MG/1
1 TABLET ORAL 2 TIMES DAILY PRN
Qty: 60 TAB | Refills: 2 | Status: SHIPPED | OUTPATIENT
Start: 2019-04-19 | End: 2019-05-19

## 2019-04-26 DIAGNOSIS — J45.20 MILD INTERMITTENT ASTHMA WITHOUT COMPLICATION: ICD-10-CM

## 2019-04-26 RX ORDER — ALBUTEROL SULFATE 90 UG/1
2 AEROSOL, METERED RESPIRATORY (INHALATION) EVERY 6 HOURS PRN
Qty: 8.5 G | Refills: 3 | Status: SHIPPED | OUTPATIENT
Start: 2019-04-26 | End: 2019-07-03

## 2019-05-28 RX ORDER — IBUPROFEN 800 MG/1
TABLET ORAL
Qty: 90 TAB | Refills: 2 | Status: SHIPPED | OUTPATIENT
Start: 2019-05-28 | End: 2019-07-03

## 2019-06-25 RX ORDER — ACETAMINOPHEN 500 MG
1000 TABLET ORAL 3 TIMES DAILY PRN
Qty: 30 TAB | Refills: 5 | Status: SHIPPED | OUTPATIENT
Start: 2019-06-25 | End: 2019-07-03

## 2019-07-01 ENCOUNTER — OFFICE VISIT (OUTPATIENT)
Dept: MEDICAL GROUP | Facility: MEDICAL CENTER | Age: 29
End: 2019-07-01
Attending: FAMILY MEDICINE
Payer: MEDICAID

## 2019-07-01 ENCOUNTER — PATIENT MESSAGE (OUTPATIENT)
Dept: MEDICAL GROUP | Facility: MEDICAL CENTER | Age: 29
End: 2019-07-01

## 2019-07-01 VITALS
TEMPERATURE: 98 F | HEART RATE: 96 BPM | OXYGEN SATURATION: 100 % | WEIGHT: 168 LBS | DIASTOLIC BLOOD PRESSURE: 78 MMHG | BODY MASS INDEX: 28.68 KG/M2 | RESPIRATION RATE: 16 BRPM | SYSTOLIC BLOOD PRESSURE: 120 MMHG | HEIGHT: 64 IN

## 2019-07-01 DIAGNOSIS — D22.9 MULTIPLE NEVI: ICD-10-CM

## 2019-07-01 DIAGNOSIS — F41.9 ANXIETY: ICD-10-CM

## 2019-07-01 DIAGNOSIS — J45.20 MILD INTERMITTENT ASTHMA WITHOUT COMPLICATION: ICD-10-CM

## 2019-07-01 DIAGNOSIS — F31.9 BIPOLAR DEPRESSION (HCC): ICD-10-CM

## 2019-07-01 PROCEDURE — 99213 OFFICE O/P EST LOW 20 MIN: CPT | Performed by: FAMILY MEDICINE

## 2019-07-01 PROCEDURE — 99214 OFFICE O/P EST MOD 30 MIN: CPT | Performed by: FAMILY MEDICINE

## 2019-07-01 RX ORDER — ALPRAZOLAM 0.5 MG/1
0.5 TABLET ORAL NIGHTLY PRN
Qty: 30 TAB | Refills: 2 | Status: SHIPPED | OUTPATIENT
Start: 2019-07-01 | End: 2019-07-03

## 2019-07-01 RX ORDER — LAMOTRIGINE 150 MG/1
150 TABLET ORAL 2 TIMES DAILY
Qty: 60 TAB | Refills: 6 | Status: SHIPPED | OUTPATIENT
Start: 2019-07-01 | End: 2019-07-03

## 2019-07-01 ASSESSMENT — PATIENT HEALTH QUESTIONNAIRE - PHQ9
SUM OF ALL RESPONSES TO PHQ QUESTIONS 1-9: 11
5. POOR APPETITE OR OVEREATING: 2 - MORE THAN HALF THE DAYS
CLINICAL INTERPRETATION OF PHQ2 SCORE: 2

## 2019-07-01 NOTE — ASSESSMENT & PLAN NOTE
Patient notes increasing feelings of anxiety recently.  She was treated in the past with occasional doses of Xanax does not recall the strength.  Not reporting palpitations or recent chest pain.

## 2019-07-01 NOTE — ASSESSMENT & PLAN NOTE
Patient was diagnosed with intermittent asthma at age 20.  Tobacco.  She notes a persistent cough over the past 2 months.  It is predominantly dry.  She does not notice any significant wheezing.

## 2019-07-01 NOTE — LETTER
[employer] [facility]  Stella Covington M.D.  [address]  [place] [address]  Fax: [phone]   Authorization for Release/Disclosure of   Protected Health Information   Name: RAHEEM PETERSON : 1990 SSN: [id number]   [address] [facility] [place] [address]  [place] [address] Phone:    [phone] (home)    I authorize the entity listed below to release/disclose the PHI below to:   [facility]/Stella Covington M.D. and Samy Martinez M.D.   Provider or Entity Name:     Address   City, State, Zip   Phone:      Fax:     Reason for request: continuity of care   Information to be released:    [  ] LAST COLONOSCOPY,  including any PATH REPORT and follow-up  [  ] LAST FIT/COLOGUARD RESULT [  ] LAST DEXA  [  ] LAST MAMMOGRAM  [  ] LAST PAP  [  ] LAST LABS [  ] RETINA EXAM REPORT  [  ] IMMUNIZATION RECORDS  [  ] Release all info      [  ] Check here and initial the line next to each item to release ALL health information INCLUDING  _____ Care and treatment for drug and / or alcohol abuse  _____ HIV testing, infection status, or AIDS  _____ Genetic Testing    DATES OF SERVICE OR TIME PERIOD TO BE DISCLOSED: _____________  I understand and acknowledge that:  * This Authorization may be revoked at any time by you in writing, except if your health information has already been used or disclosed.  * Your health information that will be used or disclosed as a result of you signing this authorization could be re-disclosed by the recipient. If this occurs, your re-disclosed health information may no longer be protected by State or Federal laws.  * You may refuse to sign this Authorization. Your refusal will not affect your ability to obtain treatment.  * This Authorization becomes effective upon signing and will  on (date) __________.      If no date is indicated, this Authorization will  one (1) year from the signature date.    Name: Raheem Peterson    Signature:   Date:     2019       PLEASE FAX  REQUESTED RECORDS BACK TO: [phone]

## 2019-07-01 NOTE — PROGRESS NOTES
"Subjective:      Corine Baugh is a 29 y.o. female who presents with Nevus (pt recently noticed the presence of 5 new nevus) and Cough (x 4 months)            HPI    ROS       Objective:     /78 (BP Location: Left arm, Patient Position: Sitting, BP Cuff Size: Adult)   Pulse 96   Temp 36.7 °C (98 °F) (Temporal)   Resp 16   Ht 1.626 m (5' 4.02\")   Wt 76.2 kg (168 lb)   LMP 04/04/2017   SpO2 100%   BMI 28.82 kg/m²      Physical Exam            Assessment/Plan:     1. Bipolar depression (HCC)  ***    2. Anxiety  ***    3. Mild intermittent asthma without complication  ***      "

## 2019-07-01 NOTE — ASSESSMENT & PLAN NOTE
Patient reports she has had increasing feelings of dominantly depression and sadness over the past several months.  She was treated in the past with Lamictal and Seroquel but has not had either of those medications for the past 8 years.  She denies suicidal ideation or confusion.  PHQ 9 score today-11

## 2019-07-01 NOTE — ASSESSMENT & PLAN NOTE
Patient would like several new nevi checked.  None of them have bled or dramatically increased in size or changed color.

## 2019-07-01 NOTE — PROGRESS NOTES
Chief Complaint   Patient presents with   • Nevus     pt recently noticed the presence of 5 new nevus   • Cough     x 4 months       HISTORY OF PRESENT ILLNESS: Patient is a 29 y.o. female established patient who presents today to discuss the following problem        Bipolar depression (CMS-HCC) (formerly [facility])  Patient reports she has had increasing feelings of dominantly depression and sadness over the past several months.  She was treated in the past with Lamictal and Seroquel but has not had either of those medications for the past 8 years.  She denies suicidal ideation or confusion.  PHQ 9 score today-11    Anxiety  Patient notes increasing feelings of anxiety recently.  She was treated in the past with occasional doses of Xanax does not recall the strength.  Not reporting palpitations or recent chest pain.    Mild intermittent asthma  Patient was diagnosed with intermittent asthma at age 20.  Tobacco.  She notes a persistent cough over the past 4 months.  It is predominantly dry.  She does not notice any significant wheezing.    Multiple nevi  Patient would like several new nevi checked.  None of them have bled or dramatically increased in size or changed color.    Social history-, homemaker  Patient Active Problem List    Diagnosis Date Noted   • Multiple nevi 07/01/2019   • History of herpes genitalis 12/24/2018   • Cocaine abuse in remission (formerly [facility]) 12/24/2018   • Mild intermittent asthma 12/24/2018   • Chronic tension headache 12/21/2018   • Anxiety 12/21/2018   • Endometriosis 12/21/2018   • Sprain of deltoid ligament of left ankle 12/21/2018   • Orthopnea 12/21/2018   • Snoring 12/21/2018   • Pelvic pain in female 07/09/2018   • Bipolar depression (formerly [facility]) 01/22/2018       Allergies:Demerol; Biaxin [clarithromycin]; Bee; Betadine [povidone iodine]; Food; Iodine; Latex; Macrobid [kdc:red dye+yellow dye+nitrofurantoin+brilliant blue fcf]; Norco [hydrocodone-acetaminophen]; Shellfish allergy; and  Singulair    Current Outpatient Prescriptions   Medication Sig Dispense Refill   • lamotrigine (LAMICTAL) 150 MG tablet Take 1 Tab by mouth 2 times a day. 60 Tab 6   • Fluticasone Furoate (ARNUITY ELLIPTA) 100 MCG/ACT AEROSOL POWDER, BREATH ACTIVATED Inhale 1 Puff by mouth every day. 1 Each 11   • ALPRAZolam (XANAX) 0.5 MG Tab Take 1 Tab by mouth at bedtime as needed for Sleep for up to 30 days. 30 Tab 2   • acetaminophen (TYLENOL) 500 MG Tab Take 2 Tabs by mouth 3 times a day as needed. 30 Tab 5   •  MG Tab TAKE 1 TABLET BY MOUTH EVERY 8 HOURS AS NEEDED FOR MODERATE PAIN 90 Tab 2   • albuterol (VENTOLIN HFA) 108 (90 Base) MCG/ACT Aero Soln inhalation aerosol Inhale 2 Puffs by mouth every 6 hours as needed for Shortness of Breath. 8.5 g 3   • acyclovir (ZOVIRAX) 400 MG tablet Take 1 Tab by mouth 2 times a day. 60 Tab 5     No current facility-administered medications for this visit.        Social History   Substance Use Topics   • Smoking status: Former Smoker     Packs/day: 0.10     Years: 1.00     Types: Cigarettes     Quit date: 1/1/2010   • Smokeless tobacco: Never Used   • Alcohol use 1.8 oz/week     3 Glasses of wine per week       Family History   Problem Relation Age of Onset   • Psychiatry Mother    • Alcohol/Drug Mother    • Diabetes Father         type 1   • Alcohol/Drug Father    • Cancer Neg Hx        ROS:  Review of Systems   Constitutional: Negative for fever, chills, weight loss and malaise/fatigue.   Eyes: Negative for blurred vision.   Cardiovascular: Negative for chest pain, palpitations, orthopnea and leg swelling.   Gastrointestinal: Negative for heartburn, nausea, vomiting and abdominal pain.   Musculoskeletal: Negative for myalgias, back pain and joint pain.   Endo/Heme/Allergies: Does not bruise/bleed easily.               Exam:  /78 (BP Location: Left arm, Patient Position: Sitting, BP Cuff Size: Adult)   Pulse 96   Temp 36.7 °C (98 °F) (Temporal)   Resp 16   Ht 1.626 m (5'  "4.02\")   Wt 76.2 kg (168 lb)   SpO2 100%   General:  Well nourished, well developed female in NAD  Head is grossly normal.  Neck: Supple without JVD or bruit. Thyroid is not enlarged. Trachea is midline.  Pulmonary: Clear to ausculation .  Normal effort. No rales, ronchi, or wheezing.  Cardiovascular: Regular rate and rhythm without murmur.  Abdomen-Abdomen is soft, normal bowel sounds, no masses, guarding, ororganomegaly, or tenderness.  Lower extremities- neg for pretibial edema, redness, tenderness.  Skin-scattered 2 to 5 mm light tan nevi with sharp margins not thickened are noted over the lateral right breast near the nipple, anterior right shoulder, right upper quadrant, right mid lateral thoracic region  Psych-normal affect with good eye contact. Normal grooming. Oriented x4.    Please note that this dictation was created using voice recognition software. I have made every reasonable attempt to correct obvious errors, but I expect that there are errors of grammar and possibly content that I did not discover before finalizing the note.    Assessment/Plan:  1. Bipolar depression (HCC)     2. Anxiety  ALPRAZolam (XANAX) 0.5 MG Tab   3. Mild intermittent asthma without complication     4. Multiple nevi       Plan: 1.  Begin Lamictal 150 mg twice daily starting with single dose at bedtime x3 days  2.  Rx Arnuity 100 mcg 1 puff daily  3.  Limited use of alprazolam 0.5 mg sparingly as needed for anxiety, #30  4.  Recheck in 4 weeks  "

## 2019-07-02 ENCOUNTER — PATIENT MESSAGE (OUTPATIENT)
Dept: MEDICAL GROUP | Facility: MEDICAL CENTER | Age: 29
End: 2019-07-02

## 2019-07-02 NOTE — TELEPHONE ENCOUNTER
From: Corine Baugh  To: Samy Martinez M.D.  Sent: 7/1/2019 6:27 PM PDT  Subject: Prescription Question    I went to  my prescriptions and the steroid inhaler wasnt covered by my insurance or something? The pharmacy said they were going to contact you, i just wanted to make sure that they actually did.

## 2019-07-03 ENCOUNTER — APPOINTMENT (OUTPATIENT)
Dept: RADIOLOGY | Facility: MEDICAL CENTER | Age: 29
End: 2019-07-03
Attending: EMERGENCY MEDICINE
Payer: MEDICAID

## 2019-07-03 ENCOUNTER — HOSPITAL ENCOUNTER (EMERGENCY)
Facility: MEDICAL CENTER | Age: 29
End: 2019-07-03
Attending: EMERGENCY MEDICINE
Payer: MEDICAID

## 2019-07-03 VITALS
TEMPERATURE: 99.1 F | OXYGEN SATURATION: 97 % | BODY MASS INDEX: 29.31 KG/M2 | DIASTOLIC BLOOD PRESSURE: 95 MMHG | WEIGHT: 170.86 LBS | RESPIRATION RATE: 18 BRPM | HEART RATE: 92 BPM | SYSTOLIC BLOOD PRESSURE: 143 MMHG

## 2019-07-03 DIAGNOSIS — J03.90 TONSILLITIS: ICD-10-CM

## 2019-07-03 LAB
ALBUMIN SERPL BCP-MCNC: 4.5 G/DL (ref 3.2–4.9)
ALBUMIN/GLOB SERPL: 1.5 G/DL
ALP SERPL-CCNC: 90 U/L (ref 30–99)
ALT SERPL-CCNC: 22 U/L (ref 2–50)
ANION GAP SERPL CALC-SCNC: 7 MMOL/L (ref 0–11.9)
AST SERPL-CCNC: 20 U/L (ref 12–45)
BASOPHILS # BLD AUTO: 0.3 % (ref 0–1.8)
BASOPHILS # BLD: 0.06 K/UL (ref 0–0.12)
BILIRUB SERPL-MCNC: 0.9 MG/DL (ref 0.1–1.5)
BUN SERPL-MCNC: 13 MG/DL (ref 8–22)
CALCIUM SERPL-MCNC: 9.3 MG/DL (ref 8.4–10.2)
CHLORIDE SERPL-SCNC: 107 MMOL/L (ref 96–112)
CO2 SERPL-SCNC: 23 MMOL/L (ref 20–33)
CREAT SERPL-MCNC: 0.74 MG/DL (ref 0.5–1.4)
EOSINOPHIL # BLD AUTO: 0.08 K/UL (ref 0–0.51)
EOSINOPHIL NFR BLD: 0.4 % (ref 0–6.9)
ERYTHROCYTE [DISTWIDTH] IN BLOOD BY AUTOMATED COUNT: 39.6 FL (ref 35.9–50)
GLOBULIN SER CALC-MCNC: 3.1 G/DL (ref 1.9–3.5)
GLUCOSE SERPL-MCNC: 105 MG/DL (ref 65–99)
HCT VFR BLD AUTO: 46.9 % (ref 37–47)
HGB BLD-MCNC: 15.8 G/DL (ref 12–16)
IMM GRANULOCYTES # BLD AUTO: 0.06 K/UL (ref 0–0.11)
IMM GRANULOCYTES NFR BLD AUTO: 0.3 % (ref 0–0.9)
LYMPHOCYTES # BLD AUTO: 1.27 K/UL (ref 1–4.8)
LYMPHOCYTES NFR BLD: 6.9 % (ref 22–41)
MCH RBC QN AUTO: 28.8 PG (ref 27–33)
MCHC RBC AUTO-ENTMCNC: 33.7 G/DL (ref 33.6–35)
MCV RBC AUTO: 85.6 FL (ref 81.4–97.8)
MONOCYTES # BLD AUTO: 0.99 K/UL (ref 0–0.85)
MONOCYTES NFR BLD AUTO: 5.4 % (ref 0–13.4)
NEUTROPHILS # BLD AUTO: 15.99 K/UL (ref 2–7.15)
NEUTROPHILS NFR BLD: 86.7 % (ref 44–72)
NRBC # BLD AUTO: 0 K/UL
NRBC BLD-RTO: 0 /100 WBC
PLATELET # BLD AUTO: 221 K/UL (ref 164–446)
PMV BLD AUTO: 9.9 FL (ref 9–12.9)
POTASSIUM SERPL-SCNC: 4 MMOL/L (ref 3.6–5.5)
PROT SERPL-MCNC: 7.6 G/DL (ref 6–8.2)
RBC # BLD AUTO: 5.48 M/UL (ref 4.2–5.4)
S PYO AG THROAT QL: NORMAL
SIGNIFICANT IND 70042: NORMAL
SITE SITE: NORMAL
SODIUM SERPL-SCNC: 137 MMOL/L (ref 135–145)
SOURCE SOURCE: NORMAL
WBC # BLD AUTO: 18.5 K/UL (ref 4.8–10.8)

## 2019-07-03 PROCEDURE — 87081 CULTURE SCREEN ONLY: CPT

## 2019-07-03 PROCEDURE — 36415 COLL VENOUS BLD VENIPUNCTURE: CPT

## 2019-07-03 PROCEDURE — 70360 X-RAY EXAM OF NECK: CPT

## 2019-07-03 PROCEDURE — 96374 THER/PROPH/DIAG INJ IV PUSH: CPT

## 2019-07-03 PROCEDURE — 80053 COMPREHEN METABOLIC PANEL: CPT

## 2019-07-03 PROCEDURE — 700111 HCHG RX REV CODE 636 W/ 250 OVERRIDE (IP): Performed by: EMERGENCY MEDICINE

## 2019-07-03 PROCEDURE — 85025 COMPLETE CBC W/AUTO DIFF WBC: CPT

## 2019-07-03 PROCEDURE — 99285 EMERGENCY DEPT VISIT HI MDM: CPT

## 2019-07-03 PROCEDURE — 87880 STREP A ASSAY W/OPTIC: CPT

## 2019-07-03 RX ORDER — DEXAMETHASONE SODIUM PHOSPHATE 4 MG/ML
4 INJECTION, SOLUTION INTRA-ARTICULAR; INTRALESIONAL; INTRAMUSCULAR; INTRAVENOUS; SOFT TISSUE ONCE
Status: COMPLETED | OUTPATIENT
Start: 2019-07-03 | End: 2019-07-03

## 2019-07-03 RX ORDER — LAMOTRIGINE 150 MG/1
150 TABLET ORAL 2 TIMES DAILY
Status: SHIPPED | COMMUNITY
End: 2020-05-07 | Stop reason: SDUPTHER

## 2019-07-03 RX ORDER — ACETAMINOPHEN 500 MG
1000 TABLET ORAL EVERY 6 HOURS PRN
Status: SHIPPED | COMMUNITY
End: 2023-10-16

## 2019-07-03 RX ORDER — ACYCLOVIR 400 MG/1
400 TABLET ORAL 2 TIMES DAILY
Status: SHIPPED | COMMUNITY
End: 2020-01-21

## 2019-07-03 RX ORDER — CEPHALEXIN 500 MG/1
500 CAPSULE ORAL 4 TIMES DAILY
Qty: 28 CAP | Refills: 0 | Status: SHIPPED | OUTPATIENT
Start: 2019-07-03 | End: 2019-07-10

## 2019-07-03 RX ORDER — DEXAMETHASONE SODIUM PHOSPHATE 10 MG/ML
5 INJECTION, SOLUTION INTRAMUSCULAR; INTRAVENOUS ONCE
Status: DISCONTINUED | OUTPATIENT
Start: 2019-07-03 | End: 2019-07-03

## 2019-07-03 RX ORDER — NAPROXEN SODIUM 220 MG
440 TABLET ORAL 2 TIMES DAILY PRN
Status: SHIPPED | COMMUNITY
End: 2020-05-07

## 2019-07-03 RX ORDER — IBUPROFEN 800 MG/1
800 TABLET ORAL EVERY 8 HOURS PRN
Status: SHIPPED | COMMUNITY
End: 2023-10-16

## 2019-07-03 RX ORDER — ALBUTEROL SULFATE 90 UG/1
2 AEROSOL, METERED RESPIRATORY (INHALATION) EVERY 6 HOURS PRN
Status: SHIPPED | COMMUNITY
End: 2020-05-07

## 2019-07-03 RX ORDER — FLUCONAZOLE 150 MG/1
150 TABLET ORAL DAILY
Qty: 1 TAB | Refills: 0 | Status: SHIPPED | OUTPATIENT
Start: 2019-07-03 | End: 2019-08-12

## 2019-07-03 RX ADMIN — DEXAMETHASONE SODIUM PHOSPHATE 4 MG: 4 INJECTION, SOLUTION INTRA-ARTICULAR; INTRALESIONAL; INTRAMUSCULAR; INTRAVENOUS; SOFT TISSUE at 10:11

## 2019-07-03 ASSESSMENT — PAIN SCALES - WONG BAKER: WONGBAKER_NUMERICALRESPONSE: HURTS A WHOLE LOT

## 2019-07-03 NOTE — ED NOTES
D/c pt home,2  rx given . Pt aware of f/u instructions , aware to return for any changes or concerns. No further questions upon d/c home from ed

## 2019-07-03 NOTE — ED TRIAGE NOTES
Pt amb to triage c/o sore throat and cough x2wks; pt seen at pcp x2d ago; pt states s/s worsening.

## 2019-07-03 NOTE — ED NOTES
Medication Reconciliation updated and complete per pt at bedside  Allergies have been verified and updated   No oral ABX within the last 14 days  Pt Home Pharmacy:[facility]

## 2019-07-03 NOTE — ED PROVIDER NOTES
"ED Provider Note    CHIEF COMPLAINT  Chief Complaint   Patient presents with   • Sore Throat       HPI  Corine Baugh is a 29 y.o. female who presents sore throat she has had for the past 2 weeks with swelling in throat she had before.  She did have a cold and she still has a cough that started 4 months ago never really cleared it is nonproductive.  She had increasing sore throat on Monday 2 days prior to presentation saw  was prescribed her some steroid and albuterol that she was unable to get it filled due to insurance reason she has increased pain.  She states she is short of breath because she cannot breathe through her mouth.  She comes in for evaluation.  She does have pain with swallowing has gagging.  No documented fever chills all other systems are negative    REVIEW OF SYSTEMS  See HPI for further details. All other systems are negative.      PAST MEDICAL HISTORY  Past Medical History:   Diagnosis Date   • Asthma     prn inhalers   • Depression     anxiety bipolar depression   • Endometriosis    • Heart burn    • Herpes genitalia     last breakout 12/2016   • Migraine aura without headache    • Seizure disorder (HCC)     Pt statets \"absent seizures\" last 2016       FAMILY HISTORY  Family History   Problem Relation Age of Onset   • Psychiatry Mother    • Alcohol/Drug Mother    • Diabetes Father         type 1   • Alcohol/Drug Father    • Cancer Neg Hx        SOCIAL HISTORY  Social History     Social History   • Marital status:      Spouse name: N/A   • Number of children: N/A   • Years of education: N/A     Social History Main Topics   • Smoking status: Former Smoker     Packs/day: 0.10     Years: 1.00     Types: Cigarettes     Quit date: 1/1/2010   • Smokeless tobacco: Never Used   • Alcohol use 1.8 oz/week     3 Glasses of wine per week   • Drug use: No      Comment: hisory of intranasal cocaine x 3 years, last use 2009   • Sexual activity: Yes     Partners: Male     Birth control/ " "protection: Surgical     Other Topics Concern   • Not on file     Social History Narrative   • No narrative on file       SURGICAL HISTORY  Past Surgical History:   Procedure Laterality Date   • PELVISCOPY N/A 7/9/2018    Procedure: PELVISCOPY/ DIAGNOSTIC;  Surgeon: Francisco Cordon M.D.;  Location: SURGERY SAME DAY [facility] ORS;  Service: Gynecology   • EXAM UNDER ANESTHESIA N/A 7/9/2018    Procedure: EXAM UNDER ANESTHESIA;  Surgeon: Francisco Cordon M.D.;  Location: SURGERY SAME DAY [facility] ORS;  Service: Gynecology   • LAPAROSCOPIC LYSIS OF ADHESIONS Left 7/9/2018    Procedure: LAPAROSCOPIC LYSIS OF ADHESIONS;  Surgeon: Francisco Cordon M.D.;  Location: SURGERY SAME DAY [facility] ORS;  Service: Gynecology   • VAGINAL HYSTERECTOMY SCOPE TOTAL Bilateral 4/21/2017    Procedure: VAGINAL HYSTERECTOMY SCOPE TOTAL W/BILATERAL SALPINGECTOMY;  Surgeon: Sonny Larry M.D.;  Location: SURGERY SAME DAY [facility] ORS;  Service:    • CYSTOSCOPY  4/21/2017    Procedure: CYSTOSCOPY;  Surgeon: Sonny Larry M.D.;  Location: SURGERY SAME DAY [facility] ORS;  Service:    • GYN SURGERY  2014    tubal   • DENTAL EXTRACTION(S)         CURRENT MEDICATIONS  Home Medications    **Home medications have not yet been reviewed for this encounter**         ALLERGIES  Allergies   Allergen Reactions   • Demerol      \"I go crazy.\"   • Biaxin [Clarithromycin] Hives   • Bee    • Betadine [Povidone Iodine]      Told by MD not to use   • Food Nausea and Swelling     Avocado, banana, eggplant, mushroom, lavander  Lip swelling, mouth tingling itchy throat    • Iodine      Told by MD not to use   • Latex Rash     Spreads beyond contact site   • Macrobid [Kdc:Red Dye+Yellow Dye+Nitrofurantoin+Brilliant Blue Fcf] Swelling   • Norco [Hydrocodone-Acetaminophen] Vomiting     No vicodin-percocet ok   • Shellfish Allergy Anaphylaxis   • Singulair      Felt \"mean\"       PHYSICAL EXAM  VITAL SIGNS: /95   Pulse (!) 125   Temp 37.1 °C (98.8 °F)   " Resp 16   Wt 77.5 kg (170 lb 13.7 oz)   LMP 04/04/2017   SpO2 97%   BMI 29.31 kg/m²  Room air O2: 97    Constitutional :  Well developed, Well nourished, No acute distress, Non-toxic appearance.   HENT: Slight erythema of the posterior tissues behind the tonsils no pharyngitis tonsillar enlargement symmetric no exudate  Eyes: No conjunctivitis  Neck: Normal range of motion, No tenderness, Supple, No stridor.   Lymphatic: Tender bilateral enlarged superior symmetric anterior cervical adenopathy  Cardiovascular: Normal heart rate, Normal rhythm, No murmurs, No rubs, No gallops.   Thorax & Lungs: Clear to auscultation  Skin: Warm, Dry, No erythema, No rash.   Extremities: Intact distal pulses, No edema, No tenderness, No cyanosis, No clubbing.       RADIOLOGY/PROCEDURES  DX-NECK FOR SOFT TISSUE   Final Result         No abnormality detected.        Results for orders placed or performed during the hospital encounter of 07/03/19   CBC WITH DIFFERENTIAL   Result Value Ref Range    WBC 18.5 (H) 4.8 - 10.8 K/uL    RBC 5.48 (H) 4.20 - 5.40 M/uL    Hemoglobin 15.8 12.0 - 16.0 g/dL    Hematocrit 46.9 37.0 - 47.0 %    MCV 85.6 81.4 - 97.8 fL    MCH 28.8 27.0 - 33.0 pg    MCHC 33.7 33.6 - 35.0 g/dL    RDW 39.6 35.9 - 50.0 fL    Platelet Count 221 164 - 446 K/uL    MPV 9.9 9.0 - 12.9 fL    Neutrophils-Polys 86.70 (H) 44.00 - 72.00 %    Lymphocytes 6.90 (L) 22.00 - 41.00 %    Monocytes 5.40 0.00 - 13.40 %    Eosinophils 0.40 0.00 - 6.90 %    Basophils 0.30 0.00 - 1.80 %    Immature Granulocytes 0.30 0.00 - 0.90 %    Nucleated RBC 0.00 /100 WBC    Neutrophils (Absolute) 15.99 (H) 2.00 - 7.15 K/uL    Lymphs (Absolute) 1.27 1.00 - 4.80 K/uL    Monos (Absolute) 0.99 (H) 0.00 - 0.85 K/uL    Eos (Absolute) 0.08 0.00 - 0.51 K/uL    Baso (Absolute) 0.06 0.00 - 0.12 K/uL    Immature Granulocytes (abs) 0.06 0.00 - 0.11 K/uL    NRBC (Absolute) 0.00 K/uL   COMP METABOLIC PANEL   Result Value Ref Range    Sodium 137 135 - 145 mmol/L     Potassium 4.0 3.6 - 5.5 mmol/L    Chloride 107 96 - 112 mmol/L    Co2 23 20 - 33 mmol/L    Anion Gap 7.0 0.0 - 11.9    Glucose 105 (H) 65 - 99 mg/dL    Bun 13 8 - 22 mg/dL    Creatinine 0.74 0.50 - 1.40 mg/dL    Calcium 9.3 8.4 - 10.2 mg/dL    AST(SGOT) 20 12 - 45 U/L    ALT(SGPT) 22 2 - 50 U/L    Alkaline Phosphatase 90 30 - 99 U/L    Total Bilirubin 0.9 0.1 - 1.5 mg/dL    Albumin 4.5 3.2 - 4.9 g/dL    Total Protein 7.6 6.0 - 8.2 g/dL    Globulin 3.1 1.9 - 3.5 g/dL    A-G Ratio 1.5 g/dL   RAPID STREP, CULT IF INDICATED (CULTURE IF NEGATIVE)   Result Value Ref Range    Significant Indicator NEG     Source CXBSI     Site THROAT     Rapid Strep Screen       Negative for Group A streptococcus.  A negative result may be obtained if the specimen is  inadequate or antigen concentration is below the  sensitivity of the test. This negative test will be followed  up with a culture as requested.     ESTIMATED GFR   Result Value Ref Range    GFR If African American >60 >60 mL/min/1.73 m 2    GFR If Non African American >60 >60 mL/min/1.73 m 2          COURSE & MEDICAL DECISION MAKING  Pertinent Labs & Imaging studies reviewed. (See chart for details)  The patient has a and erythema posterior to the tonsils and adjacent to.  There is slight tonsillar infection.  The rapid strep is negative but her white count is significantly elevated with left shift she also has a significant lymphadenopathy that symmetric.  No trismus drooling or stridor.  I do suspect possible bacterial infection still so I am a place her on Keflex.  Initially I was going to place her on a azithromycin but she is allergic to clarithromycin therefore medication changes made.  She is given Decadron dose in the ER.  I have given her a prescription for Diflucan.  She is given return precautions and follow-up    FINAL IMPRESSION  1.   1. Tonsillitis        2.   3.      Electronically signed by: Sergio Jones, 7/3/2019

## 2019-07-05 LAB
S PYO SPEC QL CULT: NORMAL
SIGNIFICANT IND 70042: NORMAL
SITE SITE: NORMAL
SOURCE SOURCE: NORMAL

## 2019-07-08 ENCOUNTER — OFFICE VISIT (OUTPATIENT)
Dept: MEDICAL GROUP | Facility: MEDICAL CENTER | Age: 29
End: 2019-07-08
Attending: INTERNAL MEDICINE
Payer: MEDICAID

## 2019-07-08 VITALS
HEART RATE: 108 BPM | DIASTOLIC BLOOD PRESSURE: 82 MMHG | HEIGHT: 64 IN | BODY MASS INDEX: 28.68 KG/M2 | SYSTOLIC BLOOD PRESSURE: 124 MMHG | OXYGEN SATURATION: 98 % | RESPIRATION RATE: 16 BRPM | TEMPERATURE: 98 F | WEIGHT: 168 LBS

## 2019-07-08 DIAGNOSIS — R05.9 COUGH: ICD-10-CM

## 2019-07-08 DIAGNOSIS — R06.83 SNORING: ICD-10-CM

## 2019-07-08 DIAGNOSIS — M26.04 MANDIBULAR MICROGNATHIA: ICD-10-CM

## 2019-07-08 PROCEDURE — 99213 OFFICE O/P EST LOW 20 MIN: CPT | Performed by: FAMILY MEDICINE

## 2019-07-08 PROCEDURE — 99212 OFFICE O/P EST SF 10 MIN: CPT | Performed by: INTERNAL MEDICINE

## 2019-07-08 RX ORDER — BENZONATATE 200 MG/1
200 CAPSULE ORAL 3 TIMES DAILY PRN
Qty: 15 CAP | Refills: 0 | Status: SHIPPED | OUTPATIENT
Start: 2019-07-08 | End: 2020-05-07

## 2019-07-08 RX ORDER — PREDNISONE 10 MG/1
TABLET ORAL
Qty: 30 TAB | Refills: 0 | Status: SHIPPED
Start: 2019-07-08 | End: 2020-05-07

## 2019-07-08 NOTE — PROGRESS NOTES
"Subjective:      Corine Baugh is a 29 y.o. female who presents with No chief complaint on file.            HPI 1.  Cough-patient is developed a very severe cough since we saw her last week.  She presented to [facility] emergency room on the evening of 7/3.  They noted a elevated white blood cell count with a left shift at 18,000.  Unremarkable neck x-ray.  They also noted reddened enlarged tonsils (patient has a history of chronic tonsillar hypertrophy).  They started her on Keflex which she has not really felt has made much difference.  In the past 5 days patient has noted marked increase in the frequency of cough mostly dry other than bringing up some dry mucus first thing in the morning.  She also had a temperature of 104.7 on 7/4/2019.  She continues to take Keflex at this time along with Arnuity once daily and albuterol rescue inhaler several times per day also without significant benefit.  2.  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  She had difficulty getting that arranged at that time but is actively interested in tonsillectomy now.  She notes increasing severity of snoring and dental malocclusion.    ROS emesis with gagging, negative for diarrhea.  No dysuria       Objective:     /82 (BP Location: Left arm, Patient Position: Sitting)   Pulse (!) 108   Temp 36.7 °C (98 °F) (Temporal)   Resp 16   Ht 1.626 m (5' 4.02\")   Wt 76.2 kg (168 lb)   LMP 04/04/2017   SpO2 98%   BMI 28.82 kg/m²      Physical Exam  General- alert,cooperative patient in no acute distress  Ears- normal tms without redness, perforation. Canals unremarkable  Nares- clear, pink, moist mucosa without bleeding. No purulent nasal DC  Orophx.- lips normal. Clear, pink, moist mucosa without redness or exudate. Tongue is midline.  Tonsils are generous and slightly pink without exudate  Chest-Normal to auscultation and percussion, movement is " symmetric. Nontender to palpation.  Skin-Skin is clear without rash, edema, redness, or cyanosis.              Assessment/Plan:     1. Cough      2. Snoring      3. Mandibular micrognathia      Plan: 1.  Prednisone 50 mg taper  2.  May suspend Arnuity temporarily  3.  ENT referral  4.  Rx Tessalon 200 mg 3 times daily.  Cough  4.  Follow-up as needed

## 2019-07-09 PROBLEM — Z15.89 HLA B27 (HLA B27 POSITIVE): Status: ACTIVE | Noted: 2019-07-09

## 2019-07-10 ENCOUNTER — PATIENT MESSAGE (OUTPATIENT)
Dept: MEDICAL GROUP | Facility: MEDICAL CENTER | Age: 29
End: 2019-07-10

## 2019-07-10 RX ORDER — ERYTHROMYCIN 5 MG/G
OINTMENT OPHTHALMIC
Qty: 1 TUBE | Refills: 0 | Status: SHIPPED | OUTPATIENT
Start: 2019-07-10 | End: 2020-05-07

## 2019-07-23 RX ORDER — SUMATRIPTAN 100 MG/1
100 TABLET, FILM COATED ORAL
Qty: 10 TAB | Refills: 2 | Status: SHIPPED | OUTPATIENT
Start: 2019-07-23 | End: 2021-01-12

## 2019-08-09 ENCOUNTER — PATIENT MESSAGE (OUTPATIENT)
Dept: MEDICAL GROUP | Facility: MEDICAL CENTER | Age: 29
End: 2019-08-09

## 2019-08-12 RX ORDER — FLUCONAZOLE 150 MG/1
150 TABLET ORAL DAILY
Qty: 2 TAB | Refills: 0 | Status: SHIPPED | OUTPATIENT
Start: 2019-08-12 | End: 2020-05-07

## 2019-08-13 ENCOUNTER — PATIENT MESSAGE (OUTPATIENT)
Dept: MEDICAL GROUP | Facility: MEDICAL CENTER | Age: 29
End: 2019-08-13

## 2019-08-13 DIAGNOSIS — R05.9 COUGH: ICD-10-CM

## 2019-08-13 DIAGNOSIS — R06.83 SNORING: ICD-10-CM

## 2019-08-13 DIAGNOSIS — J45.20 MILD INTERMITTENT ASTHMA WITHOUT COMPLICATION: ICD-10-CM

## 2019-09-25 ENCOUNTER — PATIENT MESSAGE (OUTPATIENT)
Dept: MEDICAL GROUP | Facility: MEDICAL CENTER | Age: 29
End: 2019-09-25

## 2019-09-26 NOTE — TELEPHONE ENCOUNTER
From: Corine Baugh  To: Samy Martinez M.D.  Sent: 9/25/2019 8:26 PM PDT  Subject: Non-Urgent Medical Question    Do i need to be seen to get an antibiotic or am i able to get one sent in? My gynecologist is booked out months right now and I have bacterial vaginosis, i had it once before and its the same symptoms.

## 2019-10-24 ENCOUNTER — OFFICE VISIT (OUTPATIENT)
Dept: URGENT CARE | Facility: CLINIC | Age: 29
End: 2019-10-24
Payer: MEDICAID

## 2019-10-24 VITALS
BODY MASS INDEX: 29.06 KG/M2 | HEART RATE: 101 BPM | WEIGHT: 170.2 LBS | RESPIRATION RATE: 16 BRPM | SYSTOLIC BLOOD PRESSURE: 128 MMHG | TEMPERATURE: 97.5 F | DIASTOLIC BLOOD PRESSURE: 86 MMHG | HEIGHT: 64 IN | OXYGEN SATURATION: 100 %

## 2019-10-24 DIAGNOSIS — J45.20 MILD INTERMITTENT ASTHMA WITHOUT COMPLICATION: ICD-10-CM

## 2019-10-24 DIAGNOSIS — J06.9 VIRAL URI WITH COUGH: ICD-10-CM

## 2019-10-24 PROCEDURE — 99202 OFFICE O/P NEW SF 15 MIN: CPT | Performed by: NURSE PRACTITIONER

## 2019-10-24 ASSESSMENT — ENCOUNTER SYMPTOMS
SPUTUM PRODUCTION: 1
CHILLS: 0
ORTHOPNEA: 0
FEVER: 0
HEADACHES: 0
COUGH: 1
NAUSEA: 0
SHORTNESS OF BREATH: 0
EYE DISCHARGE: 0
SORE THROAT: 0
MYALGIAS: 0
DIARRHEA: 0
WHEEZING: 0

## 2019-10-25 NOTE — PROGRESS NOTES
Subjective:      Corine Baugh is a 29 y.o. female who presents with Cough (started this am,daughter diagnosed with pneumonia)            HPI New. Patient is 29 year old female with cough that started this morning. She denies fever, chills, sore throat, nasal congestion. She has no vomiting or diarrhea. Very worried as daughter had pneumonia this week (back to school today). She does have mild asthma but denies shortness of breath or wheezing. She has not taken any medications for this  Avocado; Banana; Bee; Betadine [povidone iodine]; Clarithromycin; Demerol; Eggplant; Iodine; Lavandin oil; Macrobid [nitrofurantoin]; Mushroom extract complex; Shellfish allergy; Hydrocodone; Latex; and Montelukast [singulair]  Current Outpatient Medications on File Prior to Visit   Medication Sig Dispense Refill   • ibuprofen (MOTRIN) 800 MG Tab TAKE 1 TABLET BY MOUTH EVERY 8 HOURS AS NEEDED FOR MODERATE PAIN 90 Tab 2   • fluconazole (DIFLUCAN) 150 MG tablet Take 1 Tab by mouth every day. 2 Tab 0   • sumatriptan (IMITREX) 100 MG tablet Take 1 Tab by mouth Once PRN for Migraine. 10 Tab 2   • erythromycin 5 MG/GM Ointment Apply 0.5 inch ribbon to left eye three times a day for 7 days or until symptoms resolve 1 Tube 0   • predniSONE (DELTASONE) 10 MG Tab 5 p.o. daily x2 days, 4 p.o. daily x2 days, 3 p.o. daily x2 days, 2 p.o. daily x2 days, 1 p.o. daily x2 days 30 Tab 0   • benzonatate (TESSALON) 200 MG capsule Take 1 Cap by mouth 3 times a day as needed for Cough. 15 Cap 0   • acetaminophen (TYLENOL) 500 MG Tab Take 1,000 mg by mouth every 6 hours as needed.     • acyclovir (ZOVIRAX) 400 MG tablet Take 400 mg by mouth 2 times a day.     • ibuprofen (MOTRIN) 800 MG Tab Take 800 mg by mouth every 8 hours as needed.     • lamotrigine (LAMICTAL) 150 MG tablet Take 150 mg by mouth 2 Times a Day.     • albuterol 108 (90 Base) MCG/ACT Aero Soln inhalation aerosol Inhale 2 Puffs by mouth every 6 hours as needed for Shortness of  Breath.     • naproxen (ALEVE) 220 MG tablet Take 440 mg by mouth 2 times a day as needed (pain).       No current facility-administered medications on file prior to visit.      Breast Cancer-related family history is not on file.  Social History     Socioeconomic History   • Marital status:      Spouse name: Not on file   • Number of children: Not on file   • Years of education: Not on file   • Highest education level: Not on file   Occupational History   • Not on file   Social Needs   • Financial resource strain: Not on file   • Food insecurity:     Worry: Not on file     Inability: Not on file   • Transportation needs:     Medical: Not on file     Non-medical: Not on file   Tobacco Use   • Smoking status: Former Smoker     Packs/day: 0.10     Years: 1.00     Pack years: 0.10     Types: Cigarettes     Last attempt to quit: 2010     Years since quittin.8   • Smokeless tobacco: Never Used   Substance and Sexual Activity   • Alcohol use: Yes     Alcohol/week: 1.8 oz     Types: 3 Glasses of wine per week   • Drug use: No     Types: Cocaine     Comment: hisory of intranasal cocaine x 3 years, last use    • Sexual activity: Yes     Partners: Male     Birth control/protection: Surgical   Lifestyle   • Physical activity:     Days per week: Not on file     Minutes per session: Not on file   • Stress: Not on file   Relationships   • Social connections:     Talks on phone: Not on file     Gets together: Not on file     Attends Confucianism service: Not on file     Active member of club or organization: Not on file     Attends meetings of clubs or organizations: Not on file     Relationship status: Not on file   • Intimate partner violence:     Fear of current or ex partner: Not on file     Emotionally abused: Not on file     Physically abused: Not on file     Forced sexual activity: Not on file   Other Topics Concern   • Not on file   Social History Narrative   • Not on file         Review of Systems  "  Constitutional: Positive for malaise/fatigue. Negative for chills and fever.   HENT: Negative for congestion and sore throat.    Eyes: Negative for discharge.   Respiratory: Positive for cough and sputum production. Negative for shortness of breath and wheezing.    Cardiovascular: Negative for chest pain and orthopnea.   Gastrointestinal: Negative for diarrhea and nausea.   Musculoskeletal: Negative for myalgias.   Neurological: Negative for headaches.   Endo/Heme/Allergies: Negative for environmental allergies.          Objective:     /86   Pulse (!) 101   Temp 36.4 °C (97.5 °F) (Temporal)   Resp 16   Ht 1.626 m (5' 4.02\")   Wt 77.2 kg (170 lb 3.2 oz)   LMP 04/04/2017   SpO2 100%   BMI 29.20 kg/m²      Physical Exam   Constitutional: She is oriented to person, place, and time. She appears well-developed and well-nourished. No distress.   HENT:   Head: Normocephalic and atraumatic.   Right Ear: External ear and ear canal normal. Tympanic membrane is not injected and not perforated. No middle ear effusion.   Left Ear: External ear and ear canal normal. Tympanic membrane is not injected and not perforated.  No middle ear effusion.   Nose: Mucosal edema present.   Mouth/Throat: No oropharyngeal exudate or posterior oropharyngeal erythema.   Eyes: Conjunctivae are normal. Right eye exhibits no discharge. Left eye exhibits no discharge.   Neck: Normal range of motion. Neck supple.   Cardiovascular: Normal rate, regular rhythm and normal heart sounds.   No murmur heard.  Pulmonary/Chest: Effort normal and breath sounds normal. No respiratory distress. She has no wheezes. She has no rales.   Musculoskeletal: Normal range of motion.   Normal movement of all 4 extremities.   Lymphadenopathy:     She has no cervical adenopathy.        Right: No supraclavicular adenopathy present.        Left: No supraclavicular adenopathy present.   Neurological: She is alert and oriented to person, place, and time. Gait " normal.   Skin: Skin is warm and dry.   Psychiatric: She has a normal mood and affect. Her behavior is normal. Thought content normal.   Nursing note and vitals reviewed.              Assessment/Plan:     1. Viral URI with cough     2. Mild intermittent asthma without complication       Viral illness at this time with no indication for antibiotics. Reviewed with patient expected course of illness and also reviewed OTC medications that may be used for symptom relief. Follow up 7-10 days if not improving.  Reassured that she will not likely catch pneumonia from her daughter.  Watch for symptoms of wheezing with her diagnosis of asthma.

## 2019-10-28 ENCOUNTER — PATIENT MESSAGE (OUTPATIENT)
Dept: MEDICAL GROUP | Facility: MEDICAL CENTER | Age: 29
End: 2019-10-28

## 2019-10-29 NOTE — TELEPHONE ENCOUNTER
From: Corine Baugh  To: Samy Martinez M.D.  Sent: 10/28/2019 8:32 PM PDT  Subject: Prescription Question    I tried to refill my Xanax and my 400mg acyclovir prescription and it said i needed approval from my dr? Is there a way you can send or resend these to the [facility] in [employer] on [address] please? Thank you.

## 2019-11-22 ENCOUNTER — PATIENT MESSAGE (OUTPATIENT)
Dept: MEDICAL GROUP | Facility: MEDICAL CENTER | Age: 29
End: 2019-11-22

## 2019-12-30 ENCOUNTER — APPOINTMENT (OUTPATIENT)
Dept: NEUROLOGY | Facility: MEDICAL CENTER | Age: 29
End: 2019-12-30

## 2020-02-03 DIAGNOSIS — F41.9 ANXIETY: ICD-10-CM

## 2020-02-03 RX ORDER — ACYCLOVIR 400 MG/1
400 TABLET ORAL 2 TIMES DAILY
Qty: 60 TAB | Refills: 4 | Status: SHIPPED | OUTPATIENT
Start: 2020-02-03 | End: 2021-02-16

## 2020-02-03 RX ORDER — ALPRAZOLAM 0.5 MG/1
0.5 TABLET ORAL NIGHTLY PRN
Qty: 30 TAB | Refills: 0 | Status: SHIPPED | OUTPATIENT
Start: 2020-02-03 | End: 2020-03-04

## 2020-02-03 NOTE — TELEPHONE ENCOUNTER
Received request via: Patient    Was the patient seen in the last year in this department? Yes      Does the patient have an active prescription (recently filled or refills available) for medication(s) requested? No

## 2020-03-23 ENCOUNTER — PATIENT MESSAGE (OUTPATIENT)
Dept: MEDICAL GROUP | Facility: MEDICAL CENTER | Age: 30
End: 2020-03-23

## 2020-03-24 ENCOUNTER — PATIENT MESSAGE (OUTPATIENT)
Dept: MEDICAL GROUP | Facility: MEDICAL CENTER | Age: 30
End: 2020-03-24

## 2020-03-24 RX ORDER — TIZANIDINE 4 MG/1
4 TABLET ORAL EVERY 6 HOURS PRN
Qty: 30 TAB | Refills: 3 | Status: SHIPPED | OUTPATIENT
Start: 2020-03-24 | End: 2021-01-12

## 2020-03-24 NOTE — TELEPHONE ENCOUNTER
From: Corine Baugh  To: Samy Martinez M.D.  Sent: 3/24/2020 4:13 PM PDT  Subject: Prescription Question    Thank you so much! Will it be sent today? I have to have my   so want to give him an estimate on when the pharmacy should get it.       ----- Message -----   From:Samy Martinez M.D.   Sent:3/24/2020 4:04 PM PDT   To:Corine Baugh   Subject:RE: Prescription Question    Corine, will send a prescription for a muscle relaxant called Zanaflex. These medicines can cause drowsiness. Dr. Villasenor      ----- Message -----   From:Corine Baugh   Sent:3/23/2020 6:15 PM PDT   To:Samy Martinez M.D.   Subject:Prescription Question    Super long shot but with everything going on I figured I would try since i cant leave with my kids, but I pinched a nerve in my right shoulder, I've been alternating heat and ice, taken Tylenol and ibuprofen but can still barely move. Is there a way a i can get a muscle relaxer or something else that could possibly help? [facility]

## 2020-03-26 NOTE — TELEPHONE ENCOUNTER
From: Corine Baugh  To: Samy Martinez M.D.  Sent: 3/24/2020 5:29 PM PDT  Subject: Prescription Question    Hi, what pharmacy did you send it to? [facility]       ----- Message -----   From:Samy Martinez M.D.   Sent:3/24/2020 4:26 PM PDT   To:Corine Baugh   Subject:RE: Prescription Question    Corine, the prescription has already been sent to your pharmacy electronically so 90 minutes should be enough time.      ----- Message -----   From:Corine Baugh   Sent:3/24/2020 4:13 PM PDT   To:Samy Martinez M.D.   Subject:Prescription Question    Thank you so much! Will it be sent today? I have to have my   so want to give him an estimate on when the pharmacy should get it.       ----- Message -----   From:Samy Martinez M.D.   Sent:3/24/2020 4:04 PM PDT   To:Corine Baugh   Subject:RE: Prescription Question    Corine, will send a prescription for a muscle relaxant called Zanaflex. These medicines can cause drowsiness. Dr. Villasenor      ----- Message -----   From:Corine Baugh   Sent:3/23/2020 6:15 PM PDT   To:Samy Martinez M.D.   Subject:Prescription Question    Super long shot but with everything going on I figured I would try since i cant leave with my kids, but I pinched a nerve in my right shoulder, I've been alternating heat and ice, taken Tylenol and ibuprofen but can still barely move. Is there a way a i can get a muscle relaxer or something else that could possibly help? [facility]

## 2020-03-30 ENCOUNTER — PATIENT MESSAGE (OUTPATIENT)
Dept: MEDICAL GROUP | Facility: MEDICAL CENTER | Age: 30
End: 2020-03-30

## 2020-03-31 ENCOUNTER — PATIENT MESSAGE (OUTPATIENT)
Dept: MEDICAL GROUP | Facility: MEDICAL CENTER | Age: 30
End: 2020-03-31

## 2020-03-31 DIAGNOSIS — N39.46 MIXED STRESS AND URGE URINARY INCONTINENCE: ICD-10-CM

## 2020-03-31 RX ORDER — TOLTERODINE TARTRATE 2 MG/1
2 TABLET, EXTENDED RELEASE ORAL 2 TIMES DAILY
Qty: 60 TAB | Refills: 3 | Status: SHIPPED | OUTPATIENT
Start: 2020-03-31 | End: 2020-05-07

## 2020-03-31 NOTE — TELEPHONE ENCOUNTER
From: Corine Baugh  To: Samy Martinez M.D.  Sent: 3/31/2020 9:34 AM PDT  Subject: Prescription Question    Im on health plan of [place] but NOT Medicaid, i pay for it      ----- Message -----   From:Samy Martinez M.D.   Sent:3/31/2020 9:27 AM PDT   To:Corine Baugh   Subject:RE: Prescription Question    Corine, which insurance coverage are you on currently, to help us select a med? Dr Villasenor      ----- Message -----   From:Corine Baugh   Sent:3/30/2020 4:51 PM PDT   To:Samy Martinez M.D.   Subject:Prescription Question    I used to take a medication for bladder control for my endometriosis, I had a few months sample for myrbetric, but insurance would not cover it, and it was $500+ a month out of pocket. Is there another medication that does the same thing? I cannot afford surgery, especially one that isn't guaranteed to work, but my bladder control is horrible.

## 2020-05-07 ENCOUNTER — HOSPITAL ENCOUNTER (OUTPATIENT)
Facility: MEDICAL CENTER | Age: 30
End: 2020-05-07
Attending: INTERNAL MEDICINE
Payer: COMMERCIAL

## 2020-05-07 ENCOUNTER — OFFICE VISIT (OUTPATIENT)
Dept: MEDICAL GROUP | Facility: MEDICAL CENTER | Age: 30
End: 2020-05-07
Payer: COMMERCIAL

## 2020-05-07 VITALS
RESPIRATION RATE: 16 BRPM | TEMPERATURE: 98.1 F | SYSTOLIC BLOOD PRESSURE: 120 MMHG | HEART RATE: 91 BPM | OXYGEN SATURATION: 97 % | DIASTOLIC BLOOD PRESSURE: 62 MMHG | WEIGHT: 169.09 LBS | BODY MASS INDEX: 28.87 KG/M2 | HEIGHT: 64 IN

## 2020-05-07 DIAGNOSIS — J45.20 MILD INTERMITTENT ASTHMA WITHOUT COMPLICATION: ICD-10-CM

## 2020-05-07 DIAGNOSIS — Z11.3 SCREEN FOR STD (SEXUALLY TRANSMITTED DISEASE): ICD-10-CM

## 2020-05-07 DIAGNOSIS — Z13.1 DIABETES MELLITUS SCREENING: ICD-10-CM

## 2020-05-07 DIAGNOSIS — R51.9 CHRONIC INTRACTABLE HEADACHE, UNSPECIFIED HEADACHE TYPE: ICD-10-CM

## 2020-05-07 DIAGNOSIS — F41.9 ANXIETY: ICD-10-CM

## 2020-05-07 DIAGNOSIS — F31.9 BIPOLAR DEPRESSION (HCC): ICD-10-CM

## 2020-05-07 DIAGNOSIS — N80.9 ENDOMETRIOSIS: ICD-10-CM

## 2020-05-07 DIAGNOSIS — Z79.899 CONTROLLED SUBSTANCE AGREEMENT SIGNED: ICD-10-CM

## 2020-05-07 DIAGNOSIS — R32 URINARY INCONTINENCE, UNSPECIFIED TYPE: ICD-10-CM

## 2020-05-07 DIAGNOSIS — T78.02XS ANAPHYLACTIC SHOCK DUE TO SHELLFISH, SEQUELA: ICD-10-CM

## 2020-05-07 DIAGNOSIS — G89.29 CHRONIC INTRACTABLE HEADACHE, UNSPECIFIED HEADACHE TYPE: ICD-10-CM

## 2020-05-07 DIAGNOSIS — Z13.6 SCREENING FOR CARDIOVASCULAR CONDITION: ICD-10-CM

## 2020-05-07 DIAGNOSIS — Z86.19 HISTORY OF HERPES GENITALIS: ICD-10-CM

## 2020-05-07 PROBLEM — G43.709 CHRONIC MIGRAINE WITHOUT AURA WITHOUT STATUS MIGRAINOSUS, NOT INTRACTABLE: Status: ACTIVE | Noted: 2020-05-07

## 2020-05-07 PROBLEM — T78.02XA ANAPHYLACTIC SHOCK DUE TO SHELLFISH: Status: ACTIVE | Noted: 2020-05-07

## 2020-05-07 LAB
FORWARD REASON: SPWHY: NORMAL
FORWARDED TO LAB: SPWHR: NORMAL
SPECIMEN SENT: SPWT1: NORMAL

## 2020-05-07 PROCEDURE — 99215 OFFICE O/P EST HI 40 MIN: CPT | Performed by: INTERNAL MEDICINE

## 2020-05-07 RX ORDER — ALBUTEROL SULFATE 90 UG/1
2 AEROSOL, METERED RESPIRATORY (INHALATION) EVERY 4 HOURS PRN
Qty: 1 INHALER | Refills: 1 | Status: SHIPPED | OUTPATIENT
Start: 2020-05-07 | End: 2021-02-01 | Stop reason: SDUPTHER

## 2020-05-07 RX ORDER — ALPRAZOLAM 0.5 MG/1
0.5 TABLET ORAL NIGHTLY PRN
Qty: 30 TAB | Refills: 0 | Status: SHIPPED | OUTPATIENT
Start: 2020-05-07 | End: 2020-06-06

## 2020-05-07 RX ORDER — BUTALBITAL, ACETAMINOPHEN AND CAFFEINE 50; 325; 40 MG/1; MG/1; MG/1
1 TABLET ORAL 2 TIMES DAILY PRN
Qty: 60 TAB | Refills: 0 | Status: SHIPPED | OUTPATIENT
Start: 2020-05-07 | End: 2020-06-06

## 2020-05-07 RX ORDER — EPINEPHRINE 0.3 MG/.3ML
0.3 INJECTION SUBCUTANEOUS ONCE
Qty: 0.3 ML | Refills: 1 | Status: SHIPPED | OUTPATIENT
Start: 2020-05-07 | End: 2020-05-07

## 2020-05-07 RX ORDER — ALPRAZOLAM 0.5 MG/1
0.5 TABLET ORAL NIGHTLY PRN
Status: SHIPPED | COMMUNITY
End: 2020-05-07 | Stop reason: SDUPTHER

## 2020-05-07 RX ORDER — LAMOTRIGINE 150 MG/1
150 TABLET ORAL 2 TIMES DAILY
Qty: 180 TAB | Refills: 1 | Status: SHIPPED | OUTPATIENT
Start: 2020-05-07 | End: 2020-09-10 | Stop reason: SDUPTHER

## 2020-05-07 ASSESSMENT — FIBROSIS 4 INDEX: FIB4 SCORE: 0.54

## 2020-05-07 NOTE — PROGRESS NOTES
New Patient to Establish    Reason to establish: New patient to establish    Corine Baugh is a 29 y.o. female who presents today with the following:    CC:   Chief Complaint   Patient presents with   • Establish Care   • Medication Refill       HPI:     Urinary incontinence  Chronic urinary incontinence for several months      Anxiety  Chronic anxiety, stable.   Intermittent severe anxiety attack taking xanax prn        Bipolar depression (HCC)  Chronic stable taking lamotrigine.   Referral to psychiatry      Mild intermittent asthma  Stable on albuterol prn      Chronic intractable headache  Chronic intractable headache, was seeing neurology  Taking fiorocet prn      Endometriosis  Chronic endometriosis  S/p hysterectomy  Taking ibuprofen, tylenol prn   Referral to gyn      History of herpes genitalis  Stable on acyclovir for prophylaxis          Current Outpatient Medications:   •  lamotrigine (LAMICTAL) 150 MG tablet, Take 1 Tab by mouth 2 Times a Day for 90 days., Disp: 180 Tab, Rfl: 1  •  ALPRAZolam (XANAX) 0.5 MG Tab, Take 1 Tab by mouth at bedtime as needed for Anxiety (panic) for up to 30 days., Disp: 30 Tab, Rfl: 0  •  EPINEPHrine (EPIPEN 2-HOSSEIN) 0.3 MG/0.3ML Solution Auto-injector solution for injection, 0.3 mL by Intramuscular route Once for 1 dose., Disp: 0.3 mL, Rfl: 1  •  acetaminophen/caffeine/butalbital 325-40-50 mg (FIORICET) -40 MG Tab, Take 1 Tab by mouth 2 times a day as needed for Headache or Migraine for up to 30 days., Disp: 60 Tab, Rfl: 0  •  albuterol 108 (90 Base) MCG/ACT Aero Soln inhalation aerosol, Inhale 2 Puffs by mouth every four hours as needed for Shortness of Breath., Disp: 1 Inhaler, Rfl: 1  •  tizanidine (ZANAFLEX) 4 MG Tab, Take 1 Tab by mouth every 6 hours as needed., Disp: 30 Tab, Rfl: 3  •  acyclovir (ZOVIRAX) 400 MG tablet, Take 1 Tab by mouth 2 times a day. TAKE 1 TABLET BY MOUTH TWICE A DAY, Disp: 60 Tab, Rfl: 4  •  acetaminophen (TYLENOL) 500 MG Tab, Take  "1,000 mg by mouth every 6 hours as needed., Disp: , Rfl:   •  ibuprofen (MOTRIN) 800 MG Tab, Take 800 mg by mouth every 8 hours as needed., Disp: , Rfl:   •  sumatriptan (IMITREX) 100 MG tablet, Take 1 Tab by mouth Once PRN for Migraine. (Patient not taking: Reported on 5/7/2020), Disp: 10 Tab, Rfl: 2    Allergies, past medical history, past surgical history, medications, family history, social history reviewed and updated.    ROS     Constitutional: Denies fevers or chills  Eyes: Denies changes in vision  Ears/Nose/Throat/Mouth: Denies nasal congestion or sore throat   Cardiovascular: Denies chest pain or palpitations   Respiratory: Denies shortness of breath , Denies cough  Gastrointestinal/Hepatic: Denies abd pain, nausea, vomiting   Genitourinary: Denies dysuria or frequency  Musculoskeletal/Rheum: Denies joint pain and swelling   Neurological: intermittent chronic headache  Psychiatric: intermittent anxiety, hx of bipolar   Endocrine: Denies hx of diabetes or thyroid dysfunction  Heme/Oncology/Lymph Nodes: Denies weight changes or enlarged LNs.    Physical Exam  /62   Pulse 91   Temp 36.7 °C (98.1 °F)   Resp 16   Ht 1.626 m (5' 4\")   Wt 76.7 kg (169 lb 1.5 oz)   LMP 04/04/2017   SpO2 97%   BMI 29.02 kg/m²   General: Normal appearance.  Well developed, well nourished, no acute distress.  HEENT: Normocephalic.  Extraocular motion intact. Pupils are equally round, reactive to light and accommodation, conjunctiva clear, no scleral icterus.  Ears: normal shape and contour, ear canals clear, tympanic membranes intact. Hearing intact.  Oropharynx clear, no erythema, edema or exudate noted.  NECK: Thyroid is not enlarged. No JVD.  No carotid bruits. No masses.  Cardiovascular: Regular rhythm and rate. No murmur/rubs/gallops.   Respiratory: Normal respiratory effort, clear to auscultation bilaterally. No wheezing/rales/rhonchi.    Abdomen: Bowel sounds present, soft, nontender, nondistended, no rebound, no " guarding. No hepatosplenomegaly.  : No suprapubic tenderness. No CVA tenderness.   EXT: no LE edema b/l. No cyanosis.  No clubbing.  Lymph: No cervical, supraclavicular or axillary lymph nodes are palpable  Skin: Warm and dry.  No suspicious lesions or rashes.   Neurologic: No focal deficits.    Psych: AAOx3,  Normal mood and affect, normal judgment and insight, memory within normal limits        Assessment and Plan    1. Bipolar depression (HCC)  - lamotrigine (LAMICTAL) 150 MG tablet; Take 1 Tab by mouth 2 Times a Day for 90 days.  Dispense: 180 Tab; Refill: 1  - REFERRAL TO PSYCHIATRY  - TSH WITH REFLEX TO FT4; Future  - URINE DRUG SCREEN; Future    2. Anxiety  - ALPRAZolam (XANAX) 0.5 MG Tab; Take 1 Tab by mouth at bedtime as needed for Anxiety (panic) for up to 30 days.  Dispense: 30 Tab; Refill: 0  - REFERRAL TO PSYCHIATRY  - URINE DRUG SCREEN; Future  - Controlled Substance Treatment Agreement    3. Chronic intractable headache, unspecified headache type  - acetaminophen/caffeine/butalbital 325-40-50 mg (FIORICET) -40 MG Tab; Take 1 Tab by mouth 2 times a day as needed for Headache or Migraine for up to 30 days.  Dispense: 60 Tab; Refill: 0  - URINE DRUG SCREEN; Future  - REFERRAL TO NEUROLOGY    4. Controlled substance agreement signed  - URINE DRUG SCREEN; Future  - Controlled Substance Treatment Agreement  A Controlled Substance Agreement has been signed within the last year. A urine drug screen has been done in the past year.        The patient reports that anxiety, headache Is controlled with the current treatment plan  They were counseled on other means to help with anxiety, headache  This patient is continuing to use a controlled substance (CS) on a long term basis.  The patient is thoroughly aware of the goals of treatment with the CS  The patient is aware that yearly and random urine drug screens are required.  The patient has been instructed to take the CS only as prescribed.  The patient is  prohibited from sharing the CS with any other person.  The patient is instructed to inform the provider if any other CS is taken, of any alcohol or cannabis or other recreational drug use, any treatment for side effects of the CS or complications, if they have CS active rx in other states  The patient has evidence for a reason for the CS  The treatment plan has been discussed with the patient  The  report has been reviewed    5. Endometriosis  - REFERRAL TO OB/GYN  - CBC WITH DIFFERENTIAL; Future    6. Urinary incontinence, unspecified type  - REFERRAL TO OB/GYN  - URINALYSIS,CULTURE IF INDICATED; Future    7. History of herpes genitalis  Acyclovir    8. Anaphylactic shock due to shellfish, sequela  - EPINEPHrine (EPIPEN 2-HOSSEIN) 0.3 MG/0.3ML Solution Auto-injector solution for injection; 0.3 mL by Intramuscular route Once for 1 dose.  Dispense: 0.3 mL; Refill: 1    9. Mild intermittent asthma without complication  - albuterol 108 (90 Base) MCG/ACT Aero Soln inhalation aerosol; Inhale 2 Puffs by mouth every four hours as needed for Shortness of Breath.  Dispense: 1 Inhaler; Refill: 1    10. Diabetes mellitus screening  - Comp Metabolic Panel; Future    11. Screening for cardiovascular condition  - Lipid Profile; Future    12. Screen for STD (sexually transmitted disease)  - Chlamydia/GC PCR Urine Or Swab; Future  - HIV AG/AB COMBO ASSAY SCREENING; Future  - T.PALLIDUM AB EIA; Future  - HEP B SURFACE ANTIGEN; Future  - HEP B SURFACE AB; Future  - HEP C VIRUS ANTIBODY; Future    Patient was seen for 40 minutes face to face of which > 50% of appointment time was spent on counseling and coordination of care regarding the above.      Follow-up:Return if symptoms worsen or fail to improve.    This note was created using voice recognition software. There may be unintended errors in spelling, grammar or content.

## 2020-07-13 DIAGNOSIS — F41.9 ANXIETY: ICD-10-CM

## 2020-07-13 DIAGNOSIS — G89.29 CHRONIC INTRACTABLE HEADACHE, UNSPECIFIED HEADACHE TYPE: ICD-10-CM

## 2020-07-13 DIAGNOSIS — R51.9 CHRONIC INTRACTABLE HEADACHE, UNSPECIFIED HEADACHE TYPE: ICD-10-CM

## 2020-07-14 RX ORDER — ALPRAZOLAM 0.5 MG/1
TABLET ORAL
Qty: 30 TAB | Refills: 0 | Status: SHIPPED | OUTPATIENT
Start: 2020-07-14 | End: 2020-10-07 | Stop reason: SDUPTHER

## 2020-07-14 RX ORDER — BUTALBITAL, ACETAMINOPHEN AND CAFFEINE 50; 325; 40 MG/1; MG/1; MG/1
TABLET ORAL
Qty: 60 TAB | Refills: 0 | Status: SHIPPED | OUTPATIENT
Start: 2020-07-14 | End: 2020-09-10 | Stop reason: SDUPTHER

## 2020-07-14 NOTE — TELEPHONE ENCOUNTER
Received request via: Pharmacy    Was the patient seen in the last year in this department? Yes    Does the patient have an active prescription (recently filled or refills available) for medication(s) requested? No. Both set to  in [place]

## 2020-08-07 ENCOUNTER — PATIENT MESSAGE (OUTPATIENT)
Dept: MEDICAL GROUP | Facility: MEDICAL CENTER | Age: 30
End: 2020-08-07

## 2020-08-11 ENCOUNTER — PATIENT MESSAGE (OUTPATIENT)
Dept: MEDICAL GROUP | Facility: MEDICAL CENTER | Age: 30
End: 2020-08-11

## 2020-08-13 NOTE — TELEPHONE ENCOUNTER
From: Corine Baugh  To: Samy Martinez M.D.  Sent: 8/11/2020 8:56 PM PDT  Subject: Prescription Question    Is a virtual visit at all possible? I dont have anyone to watch my kids.       ----- Message -----   From:Samy Martinez M.D.   Sent:8/11/2020 6:01 PM PDT   To:Corine Baugh   Subject:RE: Prescription Question    Corine, to hopefully give you the best guidance I will need to see you in the office to examine how severe the scalp condition has become. Please schedule an appointment. Dr. Villasenor      ----- Message -----   From:Corine Baugh   Sent:8/7/2020 10:35 PM PDT   To:Samy Martinez M.D.   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 The pharmacist suggested I reach out to my dr for something stronger. I had this when I was a kid then it stopped for a few years. If there is some sort of pill or prescription shampoo that can help I would really appreciate it! My pharmacy is the [facility] on [facility] inside the [facility]. Thank you so much

## 2020-08-13 NOTE — PROGRESS NOTES
We could try it, as long as you can send me reasonable video of your scalp. Plan B would be to bring the kids, but I leave that up to you! Dr Villasenor

## 2020-09-10 ENCOUNTER — TELEMEDICINE (OUTPATIENT)
Dept: MEDICAL GROUP | Facility: MEDICAL CENTER | Age: 30
End: 2020-09-10
Attending: FAMILY MEDICINE
Payer: MEDICAID

## 2020-09-10 VITALS — WEIGHT: 170 LBS | TEMPERATURE: 98.8 F | BODY MASS INDEX: 28.32 KG/M2 | HEIGHT: 65 IN

## 2020-09-10 DIAGNOSIS — F31.9 BIPOLAR DEPRESSION (HCC): ICD-10-CM

## 2020-09-10 DIAGNOSIS — G89.29 CHRONIC INTRACTABLE HEADACHE, UNSPECIFIED HEADACHE TYPE: ICD-10-CM

## 2020-09-10 DIAGNOSIS — R51.9 CHRONIC INTRACTABLE HEADACHE, UNSPECIFIED HEADACHE TYPE: ICD-10-CM

## 2020-09-10 DIAGNOSIS — F41.9 ANXIETY: ICD-10-CM

## 2020-09-10 PROCEDURE — 99213 OFFICE O/P EST LOW 20 MIN: CPT | Performed by: FAMILY MEDICINE

## 2020-09-10 RX ORDER — BUTALBITAL, ACETAMINOPHEN AND CAFFEINE 50; 325; 40 MG/1; MG/1; MG/1
TABLET ORAL
Qty: 60 TAB | Refills: 0 | Status: SHIPPED | OUTPATIENT
Start: 2020-09-10 | End: 2020-12-21

## 2020-09-10 RX ORDER — LAMOTRIGINE 150 MG/1
150 TABLET ORAL 2 TIMES DAILY
Qty: 60 TAB | Refills: 5 | Status: SHIPPED | OUTPATIENT
Start: 2020-09-10 | End: 2020-10-10

## 2020-09-10 ASSESSMENT — FIBROSIS 4 INDEX: FIB4 SCORE: 0.56

## 2020-09-11 NOTE — PROGRESS NOTES
Telemedicine Video Visit: Established Patient   This Remote Face to Face encounter was conducted via Zoom. Given the importance of social distancing and other strategies recommended to reduce the risk of COVID-19 transmission, I am providing medical care to this patient via audio/video visit in place of an in person visit at the request of the patient. Verbal consent to telehealth, risks, benefits, and consequences were discussed. Patient retains the right to withdraw at any time. All existing confidentiality protections apply. The patient has access to all transmitted medical information. No dissemination of any patient images or information to other entities without further written consent.  Subjective:     Chief Complaint   Patient presents with   • Establish Care   • Otalgia   • Pharyngitis       Corine Baugh is a 30 y.o. female presenting for evaluation and management of:    1.  Anxiety-patient reports that she continues to get good relief on days where she has severe anxiety by taking a 0.5 mg Xanax tablet.  Last prescription was filled on 7/14/2020 and she reports she still has about 15 to 17 pills left.  She denies confusion, palpitations.  2.  Bipolar mood disorder-patient was diagnosed at age 17.  She is currently taking Lamictal 150 mg twice daily with good results.  Earlier she had reported depression and decreased motivation which has significantly lessened on the medication.  She denies any suicidal ideation.  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  3.  Migraine headaches-patient reports that she will take 0-2 Fioricet per day for severe recurrent headaches.  She denies constipation, near syncope  ROS   Denies any recent fevers or chills. No nausea or vomiting. No chest pains or shortness of breath.     Allergies   Allergen Reactions   • Avocado Nausea and Swelling     mouth tingling itchy throat    • Banana Nausea and Swelling     mouth tingling itchy throat    • Bee Anaphylaxis   •  "Betadine [Povidone Iodine] Anaphylaxis     Told by MD not to use   • Clarithromycin Hives   • Demerol      \"I go crazy.\"   • Eggplant Nausea and Swelling     mouth tingling itchy throat    • Iodine Anaphylaxis     Told by MD not to use   • Lavandin Oil Swelling     Lip swelling     • Macrobid [Nitrofurantoin] Swelling   • Mushroom Extract Complex Nausea and Swelling     mouth tingling itchy throat    • Shellfish Allergy Anaphylaxis   • Hydrocodone Vomiting     No vicodin-percocet ok     • Latex Rash     Spreads beyond contact site   • Montelukast [Singulair] Unspecified     Felt \"mean\"       Current medicines (including changes today)  Current Outpatient Medications   Medication Sig Dispense Refill   • lamotrigine (LAMICTAL) 150 MG tablet Take 1 Tab by mouth 2 Times a Day for 30 days. 60 Tab 5   • acetaminophen/caffeine/butalbital 325-40-50 mg (FIORICET) -40 MG Tab TAKE 1 TABLET BY MOUTH TWICE A DAY AS NEEDED FOR HEADACHE OR MIGRAINE FOR UP TO 30 DAYS. G43.709 60 Tab 0   • albuterol 108 (90 Base) MCG/ACT Aero Soln inhalation aerosol Inhale 2 Puffs by mouth every four hours as needed for Shortness of Breath. 1 Inhaler 1   • tizanidine (ZANAFLEX) 4 MG Tab Take 1 Tab by mouth every 6 hours as needed. 30 Tab 3   • acyclovir (ZOVIRAX) 400 MG tablet Take 1 Tab by mouth 2 times a day. TAKE 1 TABLET BY MOUTH TWICE A DAY 60 Tab 4   • sumatriptan (IMITREX) 100 MG tablet Take 1 Tab by mouth Once PRN for Migraine. (Patient not taking: Reported on 5/7/2020) 10 Tab 2   • acetaminophen (TYLENOL) 500 MG Tab Take 1,000 mg by mouth every 6 hours as needed.     • ibuprofen (MOTRIN) 800 MG Tab Take 800 mg by mouth every 8 hours as needed.       No current facility-administered medications for this visit.        Patient Active Problem List    Diagnosis Date Noted   • Urinary incontinence 05/07/2020   • Chronic intractable headache 05/07/2020   • Anaphylactic shock due to shellfish 05/07/2020   • HLA B27 (HLA B27 positive) " "07/09/2019   • Multiple nevi 07/01/2019   • History of herpes genitalis 12/24/2018   • Cocaine abuse in remission (HCC) 12/24/2018   • Mild intermittent asthma 12/24/2018   • Chronic tension headache 12/21/2018   • Anxiety 12/21/2018   • Endometriosis 12/21/2018   • Sprain of deltoid ligament of left ankle 12/21/2018   • Orthopnea 12/21/2018   • Snoring 12/21/2018   • Pelvic pain in female 07/09/2018   • Bipolar depression ([facility]) 01/22/2018       Family History   Problem Relation Age of Onset   • Psychiatric Illness Mother    • Alcohol/Drug Mother    • Diabetes Father         type 1   • Alcohol/Drug Father    • Cancer Neg Hx        She  has a past medical history of Asthma, Depression, Endometriosis, Heart burn, Herpes genitalia, Migraine 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 She also has no past medical history of Seizure ([facility]).  She  has a past surgical history that includes gyn surgery (2014); vaginal hysterectomy scope total (Bilateral, 4/21/2017); cystoscopy (4/21/2017); pelviscopy (N/A, 7/9/2018); exam under anesthesia (N/A, 7/9/2018); laparoscopic lysis of adhesions (Left, 7/9/2018); and dental extraction(s).       Objective:   Vitals obtained by patient:  Temp 37.1 °C (98.8 °F) (Temporal)   Ht 1.638 m (5' 4.5\")   Wt 77.1 kg (170 lb)   LMP 04/04/2017   BMI 28.73 kg/m²     Physical Exam: Pulse 100  Constitutional: Alert, no distress, well-groomed.  Skin: No rashes in visible areas.  Eye: Round. Conjunctiva clear, lids normal. No icterus.   ENMT: Lips pink without lesions, good dentition, moist mucous membranes. Phonation normal.  Neck: No masses, no thyromegaly. Moves freely without pain.  CV: Pulse as reported by patient  Respiratory: Unlabored respiratory effort, no cough or audible wheeze  Psych: Alert and oriented x3, normal affect and mood.       Assessment and Plan:   The following treatment plan was discussed:     1. Bipolar depression (HCC)  - lamotrigine (LAMICTAL) 150 MG tablet; Take " 1 Tab by mouth 2 Times a Day for 30 days.  Dispense: 60 Tab; Refill: 5    2. Chronic intractable headache, unspecified headache type  - acetaminophen/caffeine/butalbital 325-40-50 mg (FIORICET) -40 MG Tab; TAKE 1 TABLET BY MOUTH TWICE A DAY AS NEEDED FOR HEADACHE OR MIGRAINE FOR UP TO 30 DAYS. G43.709  Dispense: 60 Tab; Refill: 0    3. Anxiety        Follow-up: 1.  Fioricet renewed  2.  Lamictal renewed  3.  Patient will continue to work through her current supply of Xanax 0.5 mg as needed  4.  Revisit with me in 2 months-video visit acceptable    Face to Face Video Visit:   I spent 20 minutes with patient/guardian and I conducted this visit with audio and video present.  Samy Martinez M.D.

## 2020-10-07 DIAGNOSIS — F41.9 ANXIETY: ICD-10-CM

## 2020-10-07 RX ORDER — EPINEPHRINE 0.3 MG/.3ML
INJECTION SUBCUTANEOUS
COMMUNITY
Start: 2020-09-01 | End: 2021-02-11 | Stop reason: SDUPTHER

## 2020-10-08 RX ORDER — ALPRAZOLAM 0.5 MG/1
TABLET ORAL
Qty: 30 TAB | Refills: 0 | Status: SHIPPED | OUTPATIENT
Start: 2020-10-08 | End: 2020-11-08

## 2020-11-18 ENCOUNTER — PATIENT MESSAGE (OUTPATIENT)
Dept: MEDICAL GROUP | Facility: MEDICAL CENTER | Age: 30
End: 2020-11-18

## 2020-11-18 DIAGNOSIS — B37.31 YEAST VAGINITIS: ICD-10-CM

## 2020-11-19 RX ORDER — FLUCONAZOLE 150 MG/1
150 TABLET ORAL DAILY
Qty: 2 TAB | Refills: 0 | Status: SHIPPED | OUTPATIENT
Start: 2020-11-19 | End: 2021-01-12

## 2020-11-25 ENCOUNTER — PATIENT MESSAGE (OUTPATIENT)
Dept: MEDICAL GROUP | Facility: MEDICAL CENTER | Age: 30
End: 2020-11-25

## 2020-12-10 ENCOUNTER — TELEMEDICINE (OUTPATIENT)
Dept: MEDICAL GROUP | Facility: MEDICAL CENTER | Age: 30
End: 2020-12-10
Attending: FAMILY MEDICINE
Payer: MEDICAID

## 2020-12-10 DIAGNOSIS — F41.9 ANXIETY: ICD-10-CM

## 2020-12-10 PROCEDURE — 99213 OFFICE O/P EST LOW 20 MIN: CPT | Mod: CR | Performed by: FAMILY MEDICINE

## 2020-12-10 RX ORDER — BUSPIRONE HYDROCHLORIDE 10 MG/1
10 TABLET ORAL 2 TIMES DAILY
Qty: 60 TAB | Refills: 6 | Status: SHIPPED | OUTPATIENT
Start: 2020-12-10 | End: 2021-01-12

## 2020-12-10 NOTE — PROGRESS NOTES
"Telemedicine Video Visit: Established Patient   This Remote Face to Face encounter was conducted via Zoom. Given the importance of social distancing and other strategies recommended to reduce the risk of COVID-19 transmission, I am providing medical care to this patient via audio/video visit in place of an in person visit at the request of the patient. Verbal consent to telehealth, risks, benefits, and consequences were discussed. Patient retains the right to withdraw at any time. All existing confidentiality protections apply. The patient has access to all transmitted medical information. No dissemination of any patient images or information to other entities without further written consent.  Subjective:   No chief complaint on file.      Corine Baugh is a 30 y.o. female presenting for evaluation and management of:    1.  Anxiety-patient has felt continued fairly frequent episodes of severe anxiety.  However she has noticed that the 0.5 mg dose of Xanax has not been having much effect recently.  She is concerned about becoming habituated to medicine and is concerned that she is taking medicine with habit-forming ability and not getting any benefit.  She did recently have surgery to help rebuild her chin and had a rougher than anticipated recovery which has increased her stress    ROS   Denies any recent fevers or chills. No nausea or vomiting. No chest pains or shortness of breath.     Allergies   Allergen Reactions   • Avocado Nausea and Swelling     mouth tingling itchy throat    • Banana Nausea and Swelling     mouth tingling itchy throat    • Bee Anaphylaxis   • Betadine [Povidone Iodine] Anaphylaxis     Told by MD not to use   • Clarithromycin Hives   • Demerol      \"I go crazy.\"   • Eggplant Nausea and Swelling     mouth tingling itchy throat    • Iodine Anaphylaxis     Told by MD not to use   • Lavandin Oil Swelling     Lip swelling     • Macrobid [Nitrofurantoin] Swelling   • Mushroom Extract " "Complex Nausea and Swelling     mouth tingling itchy throat    • Shellfish Allergy Anaphylaxis   • Hydrocodone Vomiting     No vicodin-percocet ok     • Latex Rash     Spreads beyond contact site   • Montelukast [Singulair] Unspecified     Felt \"mean\"       Current medicines (including changes today)  Current Outpatient Medications   Medication Sig Dispense Refill   • busPIRone (BUSPAR) 10 MG Tab tablet Take 1 Tab by mouth 2 times a day. 60 Tab 6   • fluconazole (DIFLUCAN) 150 MG tablet Take 1 Tab by mouth every day. 2 Tab 0   • EPINEPHrine (EPIPEN) 0.3 MG/0.3ML Solution Auto-injector solution for injection INJECT 1 PEN INTRAMUSCULARLY FOR 1 DOSE AS NEEDED FOR SEVERE ALLERGIC REACTION OR USE AS DIRECTED     • albuterol 108 (90 Base) MCG/ACT Aero Soln inhalation aerosol Inhale 2 Puffs by mouth every four hours as needed for Shortness of Breath. 1 Inhaler 1   • tizanidine (ZANAFLEX) 4 MG Tab Take 1 Tab by mouth every 6 hours as needed. 30 Tab 3   • acyclovir (ZOVIRAX) 400 MG tablet Take 1 Tab by mouth 2 times a day. TAKE 1 TABLET BY MOUTH TWICE A DAY 60 Tab 4   • sumatriptan (IMITREX) 100 MG tablet Take 1 Tab by mouth Once PRN for Migraine. (Patient not taking: Reported on 5/7/2020) 10 Tab 2   • acetaminophen (TYLENOL) 500 MG Tab Take 1,000 mg by mouth every 6 hours as needed.     • ibuprofen (MOTRIN) 800 MG Tab Take 800 mg by mouth every 8 hours as needed.       No current facility-administered medications for this visit.        Patient Active Problem List    Diagnosis Date Noted   • Urinary incontinence 05/07/2020   • Chronic intractable headache 05/07/2020   • Anaphylactic shock due to shellfish 05/07/2020   • HLA B27 (HLA B27 positive) 07/09/2019   • Multiple nevi 07/01/2019   • History of herpes genitalis 12/24/2018   • Cocaine abuse in remission (HCC) 12/24/2018   • Mild intermittent asthma 12/24/2018   • Chronic tension headache 12/21/2018   • Anxiety 12/21/2018   • Endometriosis 12/21/2018   • Sprain of " deltoid ligament of left ankle 12/21/2018   • Orthopnea 12/21/2018   • Snoring 12/21/2018   • Pelvic pain in female 07/09/2018   • Bipolar depression (HCC) 01/22/2018       Family History   Problem Relation Age of Onset   • Psychiatric Illness Mother    • Alcohol/Drug Mother    • Diabetes Father         type 1   • Alcohol/Drug Father    • Cancer Neg Hx        She  has a past medical history of Asthma, Depression, Endometriosis, Heart burn, Herpes genitalia, Migraine aura without headache, and Seizure disorder (HCC). She also has no past medical history of Seizure (HCC).  She  has a past surgical history that includes gyn surgery (2014); vaginal hysterectomy scope total (Bilateral, 4/21/2017); cystoscopy (4/21/2017); pelviscopy (N/A, 7/9/2018); exam under anesthesia (N/A, 7/9/2018); laparoscopic lysis of adhesions (Left, 7/9/2018); and dental extraction(s).       Objective:   Vitals obtained by patient:  LMP 04/04/2017     Physical Exam:  Constitutional: Alert, no distress, well-groomed.  Skin: No rashes in visible areas.  Eye: Round. Conjunctiva clear, lids normal. No icterus.   ENMT: Lips pink without lesions, good dentition, moist mucous membranes. Phonation normal.  Neck: No masses, no thyromegaly. Moves freely without pain.  CV: Pulse as reported by patient  Respiratory: Unlabored respiratory effort, no cough or audible wheeze  Psych: Alert and oriented x3, normal affect and mood.       Assessment and Plan:   The following treatment plan was discussed:     1. Anxiety  - busPIRone (BUSPAR) 10 MG Tab tablet; Take 1 Tab by mouth 2 times a day.  Dispense: 60 Tab; Refill: 6        Follow-up: 1.  Trial of BuSpar 10 mg twice daily  2.  Contact us if not improving within 2 weeks  3.  Revisit (video) in 1 month    Face to Face Video Visit:   I spent 15 minutes with patient/guardian and I conducted this visit with audio and video present.  Samy Martinez M.D.

## 2020-12-15 ENCOUNTER — PATIENT MESSAGE (OUTPATIENT)
Dept: MEDICAL GROUP | Facility: MEDICAL CENTER | Age: 30
End: 2020-12-15

## 2020-12-15 DIAGNOSIS — J02.0 PHARYNGITIS DUE TO STREPTOCOCCUS SPECIES: ICD-10-CM

## 2020-12-17 DIAGNOSIS — R51.9 CHRONIC INTRACTABLE HEADACHE, UNSPECIFIED HEADACHE TYPE: ICD-10-CM

## 2020-12-17 DIAGNOSIS — F41.9 ANXIETY: ICD-10-CM

## 2020-12-17 DIAGNOSIS — G89.29 CHRONIC INTRACTABLE HEADACHE, UNSPECIFIED HEADACHE TYPE: ICD-10-CM

## 2020-12-17 RX ORDER — AMOXICILLIN 500 MG/1
500 CAPSULE ORAL 3 TIMES DAILY
Qty: 21 CAP | Refills: 0 | Status: SHIPPED | OUTPATIENT
Start: 2020-12-17 | End: 2021-01-12

## 2020-12-18 ENCOUNTER — PATIENT MESSAGE (OUTPATIENT)
Dept: MEDICAL GROUP | Facility: MEDICAL CENTER | Age: 30
End: 2020-12-18

## 2020-12-21 ENCOUNTER — PATIENT MESSAGE (OUTPATIENT)
Dept: MEDICAL GROUP | Facility: MEDICAL CENTER | Age: 30
End: 2020-12-21

## 2020-12-21 RX ORDER — ALPRAZOLAM 0.5 MG/1
TABLET ORAL
Qty: 30 TAB | Refills: 0 | Status: SHIPPED | OUTPATIENT
Start: 2020-12-21 | End: 2020-12-22 | Stop reason: SDUPTHER

## 2020-12-21 RX ORDER — BUTALBITAL, ACETAMINOPHEN AND CAFFEINE 50; 325; 40 MG/1; MG/1; MG/1
TABLET ORAL
Qty: 60 TAB | Refills: 0 | Status: SHIPPED | OUTPATIENT
Start: 2020-12-21 | End: 2021-02-01

## 2020-12-22 ENCOUNTER — PATIENT MESSAGE (OUTPATIENT)
Dept: MEDICAL GROUP | Facility: MEDICAL CENTER | Age: 30
End: 2020-12-22

## 2020-12-22 DIAGNOSIS — F41.9 ANXIETY: ICD-10-CM

## 2020-12-22 RX ORDER — ALPRAZOLAM 0.5 MG/1
TABLET ORAL
Qty: 30 TAB | Refills: 1 | Status: SHIPPED | OUTPATIENT
Start: 2020-12-22 | End: 2021-08-02 | Stop reason: SDUPTHER

## 2020-12-22 NOTE — TELEPHONE ENCOUNTER
From: Corine Baugh  To: Samy Martinez M.D.  Sent: 12/21/2020 5:58 PM PST  Subject: Prescription Question    Yes, I was put on it as a teenager, according to my mom it turned me into a zombie       ----- Message -----   From:Samy Martinez M.D.   Sent:12/21/2020 5:57 PM PST   To:Corine Baugh   Subject:RE: Prescription Question    Corine, unfortunately it sounds like buspirone and you do not get along. Therefore I do not think increasing the dose is going to go at all well. I would actually have you go ahead and stop it at this time. An alternative class of medicines that can help us include some of the antidepressants. Have you ever taken sertraline in the past? If so how did you do on it? Dr. Villasenor      ----- Message -----   From:Corine Baugh   Sent:12/18/2020 4:12 PM PST   To:Samy Martinez M.D.   Subject:Prescription Question    You had asked me to let you know how i feel about the Buspar medication, I have been taking it morning and night like prescribed and it os making me super on edge and irritable and snappy at everything and everyone, I'm having a difficult time getting to sleep even though im tired.

## 2020-12-22 NOTE — TELEPHONE ENCOUNTER
From: Corine Baugh  To: Samy Martinez M.D.  Sent: 12/22/2020 10:03 AM PST  Subject: Prescription Question    I dont believe that I have, but due to my history with medications I really don't want to switch things up and start a new long term medication. Im used to the Lamictal i have taken since i was 18 and drs switched from Xanax and Valium. I understand that you might not want to prescribe these, so I will continue trying to deal with it.       ----- Message -----   From:Samy Martinez M.D.   Sent:12/22/2020 10:00 AM PST   To:Corine Baugh   Subject:RE: Prescription Question    Corine, becoming a zombie does not sound like much fun. Did you ever take any other medicines such as Effexor, citalopram?      ----- Message -----   From:Corine Baugh   Sent:12/21/2020 5:58 PM PST   To:Samy Martinez M.D.   Subject:Prescription Question    Yes, I was put on it as a teenager, according to my mom it turned me into a zombie       ----- Message -----   From:Samy Martinez M.D.   Sent:12/21/2020 5:57 PM PST   To:Corine Baugh   Subject:RE: Prescription Question    Corine, unfortunately it sounds like buspirone and you do not get along. Therefore I do not think increasing the dose is going to go at all well. I would actually have you go ahead and stop it at this time. An alternative class of medicines that can help us include some of the antidepressants. Have you ever taken sertraline in the past? If so how did you do on it? Dr. Villasenor      ----- Message -----   From:Corine Baugh   Sent:12/18/2020 4:12 PM PST   To:Samy Martinez M.D.   Subject:Prescription Question    You had asked me to let you know how i feel about the Buspar medication, I have been taking it morning and night like prescribed and it os making me super on edge and irritable and snappy at everything and everyone, I'm having a difficult time getting to sleep even though im tired.

## 2020-12-28 ENCOUNTER — PATIENT MESSAGE (OUTPATIENT)
Dept: MEDICAL GROUP | Facility: MEDICAL CENTER | Age: 30
End: 2020-12-28

## 2020-12-30 ENCOUNTER — PATIENT MESSAGE (OUTPATIENT)
Dept: MEDICAL GROUP | Facility: MEDICAL CENTER | Age: 30
End: 2020-12-30

## 2021-01-11 ENCOUNTER — TELEPHONE (OUTPATIENT)
Dept: NEUROLOGY | Facility: MEDICAL CENTER | Age: 31
End: 2021-01-11

## 2021-01-12 ENCOUNTER — TELEPHONE (OUTPATIENT)
Dept: NEUROLOGY | Facility: MEDICAL CENTER | Age: 31
End: 2021-01-12

## 2021-01-12 ENCOUNTER — TELEMEDICINE (OUTPATIENT)
Dept: NEUROLOGY | Facility: MEDICAL CENTER | Age: 31
End: 2021-01-12
Attending: NURSE PRACTITIONER
Payer: MEDICAID

## 2021-01-12 VITALS — BODY MASS INDEX: 29.71 KG/M2 | WEIGHT: 174 LBS | HEIGHT: 64 IN

## 2021-01-12 DIAGNOSIS — F31.9 BIPOLAR DEPRESSION (HCC): ICD-10-CM

## 2021-01-12 DIAGNOSIS — F14.11 COCAINE ABUSE IN REMISSION (HCC): ICD-10-CM

## 2021-01-12 DIAGNOSIS — G43.419 INTRACTABLE HEMIPLEGIC MIGRAINE WITHOUT STATUS MIGRAINOSUS: ICD-10-CM

## 2021-01-12 PROCEDURE — 99214 OFFICE O/P EST MOD 30 MIN: CPT | Mod: 95,CR | Performed by: NURSE PRACTITIONER

## 2021-01-12 RX ORDER — KETOROLAC TROMETHAMINE 15.75 MG/1
1-2 SPRAY, METERED NASAL
Qty: 5 EACH | Refills: 2 | Status: SHIPPED | OUTPATIENT
Start: 2021-01-12 | End: 2021-04-29

## 2021-01-12 RX ORDER — LAMOTRIGINE 150 MG/1
150 TABLET ORAL 2 TIMES DAILY
COMMUNITY
End: 2021-07-08

## 2021-01-12 SDOH — ECONOMIC STABILITY: FOOD INSECURITY: WITHIN THE PAST 12 MONTHS, THE FOOD YOU BOUGHT JUST DIDN'T LAST AND YOU DIDN'T HAVE MONEY TO GET MORE.: SOMETIMES TRUE

## 2021-01-12 SDOH — HEALTH STABILITY: PHYSICAL HEALTH: ON AVERAGE, HOW MANY DAYS PER WEEK DO YOU ENGAGE IN MODERATE TO STRENUOUS EXERCISE (LIKE A BRISK WALK)?: 0 DAYS

## 2021-01-12 SDOH — HEALTH STABILITY: MENTAL HEALTH: HOW MANY STANDARD DRINKS CONTAINING ALCOHOL DO YOU HAVE ON A TYPICAL DAY?: 1 OR 2

## 2021-01-12 SDOH — ECONOMIC STABILITY: TRANSPORTATION INSECURITY
IN THE PAST 12 MONTHS, HAS LACK OF TRANSPORTATION KEPT YOU FROM MEETINGS, WORK, OR FROM GETTING THINGS NEEDED FOR DAILY LIVING?: NO

## 2021-01-12 SDOH — SOCIAL STABILITY: SOCIAL NETWORK
DO YOU BELONG TO ANY CLUBS OR ORGANIZATIONS SUCH AS CHURCH GROUPS UNIONS, FRATERNAL OR ATHLETIC GROUPS, OR SCHOOL GROUPS?: NO

## 2021-01-12 SDOH — HEALTH STABILITY: MENTAL HEALTH: HOW OFTEN DO YOU HAVE A DRINK CONTAINING ALCOHOL?: MONTHLY OR LESS

## 2021-01-12 SDOH — ECONOMIC STABILITY: TRANSPORTATION INSECURITY
IN THE PAST 12 MONTHS, HAS THE LACK OF TRANSPORTATION KEPT YOU FROM MEDICAL APPOINTMENTS OR FROM GETTING MEDICATIONS?: NO

## 2021-01-12 SDOH — SOCIAL STABILITY: SOCIAL NETWORK: ARE YOU MARRIED, WIDOWED, DIVORCED, SEPARATED, NEVER MARRIED, OR LIVING WITH A PARTNER?: MARRIED

## 2021-01-12 SDOH — HEALTH STABILITY: MENTAL HEALTH: HOW OFTEN DO YOU HAVE 6 OR MORE DRINKS ON ONE OCCASION?: NEVER

## 2021-01-12 SDOH — SOCIAL STABILITY: SOCIAL NETWORK
IN A TYPICAL WEEK, HOW MANY TIMES DO YOU TALK ON THE PHONE WITH FAMILY, FRIENDS, OR NEIGHBORS?: MORE THAN THREE TIMES A WEEK

## 2021-01-12 SDOH — ECONOMIC STABILITY: INCOME INSECURITY: HOW HARD IS IT FOR YOU TO PAY FOR THE VERY BASICS LIKE FOOD, HOUSING, MEDICAL CARE, AND HEATING?: NOT HARD AT ALL

## 2021-01-12 SDOH — ECONOMIC STABILITY: FOOD INSECURITY: WITHIN THE PAST 12 MONTHS, YOU WORRIED THAT YOUR FOOD WOULD RUN OUT BEFORE YOU GOT MONEY TO BUY MORE.: NEVER TRUE

## 2021-01-12 SDOH — SOCIAL STABILITY: SOCIAL NETWORK: HOW OFTEN DO YOU GET TOGETHER WITH FRIENDS OR RELATIVES?: PATIENT DECLINED

## 2021-01-12 SDOH — SOCIAL STABILITY: SOCIAL NETWORK: HOW OFTEN DO YOU ATTENT MEETINGS OF THE CLUB OR ORGANIZATION YOU BELONG TO?: NEVER

## 2021-01-12 SDOH — HEALTH STABILITY: MENTAL HEALTH
STRESS IS WHEN SOMEONE FEELS TENSE, NERVOUS, ANXIOUS, OR CAN'T SLEEP AT NIGHT BECAUSE THEIR MIND IS TROUBLED. HOW STRESSED ARE YOU?: ONLY A LITTLE

## 2021-01-12 SDOH — SOCIAL STABILITY: SOCIAL NETWORK: HOW OFTEN DO YOU ATTEND CHURCH OR RELIGIOUS SERVICES?: NEVER

## 2021-01-12 SDOH — HEALTH STABILITY: PHYSICAL HEALTH: ON AVERAGE, HOW MANY MINUTES DO YOU ENGAGE IN EXERCISE AT THIS LEVEL?: 0 MIN

## 2021-01-12 ASSESSMENT — FIBROSIS 4 INDEX: FIB4 SCORE: 0.56

## 2021-01-12 NOTE — TELEPHONE ENCOUNTER
Patient is scheduled to see you tomorrow 01/11/2021. She has not been seen since 06/27/2018. She notified me by MyChart that she is on quarantine. Are you willing to see her virtually or do you need an in-person visit since its been almost 3 years.     Please advise.

## 2021-01-12 NOTE — PROGRESS NOTES
"Virtual Visit: Established Patient   This visit was conducted via Zoom using secure and encrypted videoconferencing technology. The patient was in a private location in the state of [place].    The patient's identity was confirmed and verbal consent was obtained for this virtual visit.    Subjective:   CC: Chronic Migraine    Chief Complaint   Patient presents with   • Follow-Up     chronic migraine       Corine Baugh is a 30 y.o. female presenting for evaluation and management of:    She has been sick with a \"cold\" but is awaiting pending COVID test results.    Headache profile:  Headaches were well-managed and actually went about 6 months without a migraine.  She has been experiencing horribly intense migraine about 4-5 times per month.    Typical headache: Looses complete vision, numbness in the face and arm.  Nausea, vomiting.    Last bad migraine was 12/28/2020.    Reports that she often has \"tension headaches\" that may occur up to 5 times per week.    Abortive treatment: fioricet    Last Brain MRI was normal.    Tried and failed: topamax, propranolol, lamotrigine, gabapentin, sumatriptan, maxalt  Has been taking fioricet 2 tablets every 6 hours.    Had a partial hysterectomy almost 3 years ago.  Going through medical steffen-menopause currently.    ROS   Denies any recent fevers or chills. No nausea or vomiting. No chest pains or shortness of breath.     Allergies   Allergen Reactions   • Avocado Nausea and Swelling     mouth tingling itchy throat    • Banana Nausea and Swelling     mouth tingling itchy throat    • Bee Anaphylaxis   • Betadine [Povidone Iodine] Anaphylaxis     Told by MD not to use   • Clarithromycin Hives   • Demerol      \"I go crazy.\"   • Eggplant Nausea and Swelling     mouth tingling itchy throat    • Iodine Anaphylaxis     Told by MD not to use   • Lavandin Oil Swelling     Lip swelling     • Macrobid [Nitrofurantoin] Swelling   • Mushroom Extract Complex Nausea and Swelling     mouth " "tingling itchy throat    • Shellfish Allergy Anaphylaxis   • Hydrocodone Vomiting     No vicodin-percocet ok     • Latex Rash     Spreads beyond contact site   • Montelukast [Singulair] Unspecified     Felt \"mean\"       Current medicines (including changes today)  Current Outpatient Medications   Medication Sig Dispense Refill   • lamotrigine (LAMICTAL) 150 MG tablet Take 150 mg by mouth 2 times a day.     • ALPRAZolam (XANAX) 0.5 MG Tab TAKE 1 TABLET BY MOUTH AT BEDTIME AS NEEDED FOR ANXIETY 30 Tab 1   • butalbital/apap/caffeine -40 mg (FIORICET) -40 MG Tab TAKE 1 TABLET BY MOUTH TWICE A DAY AS NEEDED FOR HEADACHE OR MIGRAINE FOR 30 DAYS. G43.709 60 Tab 0   • EPINEPHrine (EPIPEN) 0.3 MG/0.3ML Solution Auto-injector solution for injection INJECT 1 PEN INTRAMUSCULARLY FOR 1 DOSE AS NEEDED FOR SEVERE ALLERGIC REACTION OR USE AS DIRECTED     • albuterol 108 (90 Base) MCG/ACT Aero Soln inhalation aerosol Inhale 2 Puffs by mouth every four hours as needed for Shortness of Breath. 1 Inhaler 1   • acyclovir (ZOVIRAX) 400 MG tablet Take 1 Tab by mouth 2 times a day. TAKE 1 TABLET BY MOUTH TWICE A DAY 60 Tab 4   • acetaminophen (TYLENOL) 500 MG Tab Take 1,000 mg by mouth every 6 hours as needed.     • ibuprofen (MOTRIN) 800 MG Tab Take 800 mg by mouth every 8 hours as needed.     • amoxicillin (AMOXIL) 500 MG Cap Take 1 Cap by mouth 3 times a day. (Patient not taking: Reported on 1/12/2021) 21 Cap 0   • busPIRone (BUSPAR) 10 MG Tab tablet Take 1 Tab by mouth 2 times a day. (Patient not taking: Reported on 1/12/2021) 60 Tab 6   • fluconazole (DIFLUCAN) 150 MG tablet Take 1 Tab by mouth every day. (Patient not taking: Reported on 1/12/2021) 2 Tab 0   • tizanidine (ZANAFLEX) 4 MG Tab Take 1 Tab by mouth every 6 hours as needed. (Patient not taking: Reported on 1/12/2021) 30 Tab 3   • sumatriptan (IMITREX) 100 MG tablet Take 1 Tab by mouth Once PRN for Migraine. (Patient not taking: Reported on 5/7/2020) 10 Tab 2 " "    No current facility-administered medications for this visit.        Patient Active Problem List    Diagnosis Date Noted   • Urinary incontinence 05/07/2020   • Chronic intractable headache 05/07/2020   • Anaphylactic shock due to shellfish 05/07/2020   • HLA B27 (HLA B27 positive) 07/09/2019   • Multiple nevi 07/01/2019   • History of herpes genitalis 12/24/2018   • Cocaine abuse in remission (HCC) 12/24/2018   • Mild intermittent asthma 12/24/2018   • Chronic tension headache 12/21/2018   • Anxiety 12/21/2018   • Endometriosis 12/21/2018   • Sprain of deltoid ligament of left ankle 12/21/2018   • Orthopnea 12/21/2018   • Snoring 12/21/2018   • Pelvic pain in female 07/09/2018   • Bipolar depression ([facility]) 01/22/2018       Family History   Problem Relation Age of Onset   • Psychiatric Illness Mother    • Alcohol/Drug Mother    • Diabetes Father         type 1   • Alcohol/Drug Father    • Cancer Neg Hx        She  has a past medical history of Asthma, Depression, Endometriosis, Heart burn, Herpes genitalia, Migraine aura without headache, and Seizure disorder ([facility]). She also has no past medical history of Seizure ([facility]).  She  has a past surgical history that includes gyn surgery (2014); vaginal hysterectomy scope total (Bilateral, 4/21/2017); cystoscopy (4/21/2017); pelviscopy (N/A, 7/9/2018); exam under anesthesia (N/A, 7/9/2018); laparoscopic lysis of adhesions (Left, 7/9/2018); and dental extraction(s).       Objective:   Ht 1.626 m (5' 4\")   Wt 78.9 kg (174 lb)   LMP 04/04/2017   BMI 29.87 kg/m²     Physical Exam:  Constitutional: Alert, no distress, well-groomed.  Skin: No rashes in visible areas.  Eye: Round. Conjunctiva clear, lids normal. No icterus.   ENMT: Lips pink without lesions, good dentition, moist mucous membranes. Phonation normal.  Neck: No masses, no thyromegaly. Moves freely without pain.  Respiratory: Unlabored respiratory effort, no cough or audible wheeze  Psych: Alert and oriented x3, " "normal affect and mood.       Assessment and Plan:   The following treatment plan was discussed:     Complex Migraine with hemiplegic symptoms:  Typical headache: Looses complete vision, numbness in the face and arm.  Nausea, vomiting.    Last bad migraine was 12/28/2020 and has required emergency room care.    Reports that she often has \"tension headaches\" that may occur up to 5 times per week.    Abortive treatment: fioricet overuse.    Last Brain MRI was normal.    Counseled regarding migraine diagnosis and appropriate usage of rescue medication for complex migraines including occular aura and/or hemiplegic symptoms.    Tried and failed: topamax, propranolol, lamotrigine, gabapentin, sumatriptan, maxalt  Has been taking fioricet 2 tablets every 6 hours.    New Rx for Emgality to be faxed to her pharmacy.    New Rx for Sprix NS to be sent and authorized.    Follow-up: Return in 6 months or sooner if needed.         "

## 2021-01-13 PROBLEM — G43.419 INTRACTABLE HEMIPLEGIC MIGRAINE WITHOUT STATUS MIGRAINOSUS: Status: ACTIVE | Noted: 2021-01-13

## 2021-01-14 ENCOUNTER — TELEPHONE (OUTPATIENT)
Dept: NEUROLOGY | Facility: MEDICAL CENTER | Age: 31
End: 2021-01-14

## 2021-01-14 NOTE — TELEPHONE ENCOUNTER
PA submitted for patients ketorolac     Contact plan to follow up on [id number]  Scanned to media.       PA submitted for patients Emgality     Contact plan to follow up on [id number]  Scanned to media

## 2021-01-14 NOTE — TELEPHONE ENCOUNTER
Patients ketoralac has been denied. Per insurance she needs to try and fail a total of 3 preferred medication and the patient has only failed 2, rizatriptan and sumatriptan.     Other preferred medications include:     Naratriptan  Triptan nasal spray  Triptan injection     Please advise.

## 2021-01-18 ENCOUNTER — TELEPHONE (OUTPATIENT)
Dept: NEUROLOGY | Facility: MEDICAL CENTER | Age: 31
End: 2021-01-18

## 2021-01-18 RX ORDER — SUMATRIPTAN 20 MG/1
1 SPRAY NASAL PRN
Qty: 6 EACH | Refills: 2 | Status: SHIPPED | OUTPATIENT
Start: 2021-01-18 | End: 2021-04-29

## 2021-01-18 NOTE — TELEPHONE ENCOUNTER
Sumatriptan NS prescribed however she needs a different option when she has hemiplegic migraine symptoms due to elevated risk of stroke with the triptan family.

## 2021-01-20 ENCOUNTER — PATIENT MESSAGE (OUTPATIENT)
Dept: MEDICAL GROUP | Facility: MEDICAL CENTER | Age: 31
End: 2021-01-20

## 2021-01-25 ENCOUNTER — TELEPHONE (OUTPATIENT)
Dept: NEUROLOGY | Facility: MEDICAL CENTER | Age: 31
End: 2021-01-25

## 2021-01-25 ENCOUNTER — PATIENT MESSAGE (OUTPATIENT)
Dept: NEUROLOGY | Facility: MEDICAL CENTER | Age: 31
End: 2021-01-25

## 2021-01-26 NOTE — TELEPHONE ENCOUNTER
PA re-submitted for patients Emgality via CoverMyMeds.     Contact plan to follow up on [id number]    Scanned to media.

## 2021-01-29 DIAGNOSIS — R51.9 CHRONIC INTRACTABLE HEADACHE, UNSPECIFIED HEADACHE TYPE: ICD-10-CM

## 2021-01-29 DIAGNOSIS — G89.29 CHRONIC INTRACTABLE HEADACHE, UNSPECIFIED HEADACHE TYPE: ICD-10-CM

## 2021-02-01 ENCOUNTER — TELEPHONE (OUTPATIENT)
Dept: MEDICAL GROUP | Facility: MEDICAL CENTER | Age: 31
End: 2021-02-01

## 2021-02-01 DIAGNOSIS — J45.20 MILD INTERMITTENT ASTHMA WITHOUT COMPLICATION: ICD-10-CM

## 2021-02-01 RX ORDER — ALBUTEROL SULFATE 90 UG/1
2 AEROSOL, METERED RESPIRATORY (INHALATION) EVERY 4 HOURS PRN
Qty: 1 EACH | Refills: 11 | Status: SHIPPED | OUTPATIENT
Start: 2021-02-01 | End: 2021-08-17 | Stop reason: SDUPTHER

## 2021-02-01 RX ORDER — BUTALBITAL, ACETAMINOPHEN AND CAFFEINE 50; 325; 40 MG/1; MG/1; MG/1
TABLET ORAL
Qty: 60 TAB | Refills: 0 | Status: SHIPPED | OUTPATIENT
Start: 2021-02-01 | End: 2021-03-23

## 2021-02-03 ENCOUNTER — PATIENT MESSAGE (OUTPATIENT)
Dept: MEDICAL GROUP | Facility: MEDICAL CENTER | Age: 31
End: 2021-02-03

## 2021-02-11 DIAGNOSIS — T78.02XS ANAPHYLACTIC SHOCK DUE TO SHELLFISH, SEQUELA: ICD-10-CM

## 2021-02-11 RX ORDER — EPINEPHRINE 0.3 MG/.3ML
0.3 INJECTION SUBCUTANEOUS ONCE
Qty: 1 EACH | Refills: 3 | Status: SHIPPED | OUTPATIENT
Start: 2021-02-11 | End: 2021-12-23

## 2021-03-01 ENCOUNTER — TELEPHONE (OUTPATIENT)
Dept: NEUROLOGY | Facility: MEDICAL CENTER | Age: 31
End: 2021-03-01

## 2021-03-01 NOTE — TELEPHONE ENCOUNTER
"MyChart from patient:     \"Since my insurance doesnt seem to be approving the Ketorolac is there a different medication to try?\"    Per insurance Sprix was denied as she has not tried and failed 3 preferred medications. Patient has tried and failed naratriptan and sumatriptan.    Other preferred medications:    rizatriptan   sumatriptan nasal spray or injections     Please advise.          "

## 2021-03-02 ENCOUNTER — TELEPHONE (OUTPATIENT)
Dept: NEUROLOGY | Facility: MEDICAL CENTER | Age: 31
End: 2021-03-02

## 2021-03-02 RX ORDER — SUMATRIPTAN 20 MG/1
SPRAY NASAL
Qty: 6 EACH | Refills: 2 | Status: SHIPPED | OUTPATIENT
Start: 2021-03-02 | End: 2021-04-29

## 2021-03-03 ENCOUNTER — PATIENT MESSAGE (OUTPATIENT)
Dept: MEDICAL GROUP | Facility: MEDICAL CENTER | Age: 31
End: 2021-03-03

## 2021-03-03 ENCOUNTER — TELEPHONE (OUTPATIENT)
Dept: NEUROLOGY | Facility: MEDICAL CENTER | Age: 31
End: 2021-03-03

## 2021-03-03 NOTE — TELEPHONE ENCOUNTER
Patient would like to clarify that she is able to take the nasal spray ans she is not able to take the tabs.     Please advise.

## 2021-03-03 NOTE — TELEPHONE ENCOUNTER
To clarify, there are a few categories of migraine rescue options.    1) NSAID's-- Sprix NS, motrin, toradol, ibuprofen.  They need to be limited and taken as prescribed ideally with food.    2) Sumatriptan tabs and NS-- no more than 2 doses total in a 24 hour period.    3) Caffeine as needed    4) Fioricet-- last line treatment before urgent care or ED.

## 2021-03-03 NOTE — TELEPHONE ENCOUNTER
New Rx for sumatriptan NS prescribed as Sprix will not be approved until tried and failed per insurance.

## 2021-03-23 DIAGNOSIS — G89.29 CHRONIC INTRACTABLE HEADACHE, UNSPECIFIED HEADACHE TYPE: ICD-10-CM

## 2021-03-23 DIAGNOSIS — R51.9 CHRONIC INTRACTABLE HEADACHE, UNSPECIFIED HEADACHE TYPE: ICD-10-CM

## 2021-03-23 RX ORDER — BUTALBITAL, ACETAMINOPHEN AND CAFFEINE 50; 325; 40 MG/1; MG/1; MG/1
TABLET ORAL
Qty: 60 TABLET | Refills: 0 | Status: SHIPPED | OUTPATIENT
Start: 2021-03-23 | End: 2021-06-03

## 2021-04-26 ENCOUNTER — TELEMEDICINE (OUTPATIENT)
Dept: MEDICAL GROUP | Facility: MEDICAL CENTER | Age: 31
End: 2021-04-26
Attending: FAMILY MEDICINE
Payer: MEDICAID

## 2021-04-26 ENCOUNTER — PATIENT MESSAGE (OUTPATIENT)
Dept: MEDICAL GROUP | Facility: MEDICAL CENTER | Age: 31
End: 2021-04-26

## 2021-04-26 DIAGNOSIS — G47.9 SLEEP DISORDER: ICD-10-CM

## 2021-04-26 DIAGNOSIS — L30.8 OTHER ECZEMA: ICD-10-CM

## 2021-04-26 PROCEDURE — 99213 OFFICE O/P EST LOW 20 MIN: CPT | Mod: 95,CR | Performed by: FAMILY MEDICINE

## 2021-04-26 RX ORDER — TRAZODONE HYDROCHLORIDE 50 MG/1
50 TABLET ORAL
Qty: 30 TABLET | Refills: 3 | Status: SHIPPED | OUTPATIENT
Start: 2021-04-26 | End: 2021-08-02 | Stop reason: SDUPTHER

## 2021-04-26 RX ORDER — GALCANEZUMAB 120 MG/ML
INJECTION, SOLUTION SUBCUTANEOUS
COMMUNITY
Start: 2021-03-23 | End: 2021-04-29 | Stop reason: SDUPTHER

## 2021-04-26 NOTE — PROGRESS NOTES
Telemedicine Video Visit: Established Patient   This Remote Face to Face encounter was conducted via Zoom. Given the importance of social distancing and other strategies recommended to reduce the risk of COVID-19 transmission, I am providing medical care to this patient via audio/video visit in place of an in person visit at the request of the patient. Verbal consent to telehealth, risks, benefits, and consequences were discussed. Patient retains the right to withdraw at any time. All existing confidentiality protections apply. The patient has access to all transmitted medical information. No dissemination of any patient images or information to other entities without further written consent.  Subjective:     Chief Complaint   Patient presents with   • Rash   • Anxiety   • Sleep Problem       Corine Baugh is a 30 y.o. female presenting for evaluation and management of:    1.  Sleep disorder-patient reports she has difficulty falling asleep even though she feels very fatigued by the end of the day over the past month or 2.  She discontinued the BuSpar which we had tried for anxiety because she felt it made her feel almost a little bit more energy or irritable.  She does remain on Lamictal.  In the past she has gotten good results with trazodone when she was in teenager.  She has used Benadryl recently with transient improvement.  2.  Eczema-patient reports worsening in her eczema particularly with severe scaling across the back of her hairline on her upper neck, and scaling on her nipple.  She also reports onset of 15 new bumpy lesions on her back.  She is trying to send me a photo of those new spots.      ROS   Denies any recent fevers or chills. No nausea or vomiting. No chest pains or shortness of breath.     Allergies   Allergen Reactions   • Avocado Nausea and Swelling     mouth tingling itchy throat    • Banana Nausea and Swelling     mouth tingling itchy throat    • Bee Anaphylaxis   • Betadine  "[Povidone Iodine] Anaphylaxis     Told by MD not to use   • Clarithromycin Hives   • Demerol      \"I go crazy.\"   • Eggplant Nausea and Swelling     mouth tingling itchy throat    • Iodine Anaphylaxis     Told by MD not to use   • Lavandin Oil Swelling     Lip swelling     • Macrobid [Nitrofurantoin] Swelling   • Mushroom Extract Complex Nausea and Swelling     mouth tingling itchy throat    • Shellfish Allergy Anaphylaxis   • Hydrocodone Vomiting     No vicodin-percocet ok     • Latex Rash     Spreads beyond contact site   • Montelukast [Singulair] Unspecified     Felt \"mean\"       Current medicines (including changes today)  Current Outpatient Medications   Medication Sig Dispense Refill   • sumatriptan (IMITREX) 20 MG/ACT nasal spray Use one dose at onset of headache, may repeat dose in 2 hours if headache is unrelieved.  Do not exceed more than 2 doses in 24 hours. 6 Each 2   • acyclovir (ZOVIRAX) 400 MG tablet TAKE 1 TABLET BY MOUTH TWICE A DAY 60 tablet 10   • albuterol 108 (90 Base) MCG/ACT Aero Soln inhalation aerosol Inhale 2 Puffs every four hours as needed for Shortness of Breath. 1 Each 11   • sumatriptan (IMITREX) 20 MG/ACT nasal spray Administer 1 Spray into affected nostril(S) as needed for Migraine (at onset of migraine.  May repeat one hour later if unrelieved.). 6 Each 2   • lamotrigine (LAMICTAL) 150 MG tablet Take 150 mg by mouth 2 times a day.     • Ketorolac Tromethamine (SPRIX) 15.75 MG/SPRAY Solution Administer 1-2 Sprays into affected nostril(S) one time as needed (at onset of migraine.) for up to 1 dose. 5 Each 2   • acetaminophen (TYLENOL) 500 MG Tab Take 1,000 mg by mouth every 6 hours as needed.     • ibuprofen (MOTRIN) 800 MG Tab Take 800 mg by mouth every 8 hours as needed.       No current facility-administered medications for this visit.       Patient Active Problem List    Diagnosis Date Noted   • Intractable hemiplegic migraine without status migrainosus 01/13/2021   • Urinary " incontinence 05/07/2020   • Chronic intractable headache 05/07/2020   • Anaphylactic shock due to shellfish 05/07/2020   • HLA B27 (HLA B27 positive) 07/09/2019   • Multiple nevi 07/01/2019   • History of herpes genitalis 12/24/2018   • Cocaine abuse in remission (HCC) 12/24/2018   • Mild intermittent asthma 12/24/2018   • Chronic tension headache 12/21/2018   • Anxiety 12/21/2018   • Endometriosis 12/21/2018   • Sprain of deltoid ligament of left ankle 12/21/2018   • Orthopnea 12/21/2018   • Snoring 12/21/2018   • Pelvic pain in female 07/09/2018   • Bipolar depression (HCC) 01/22/2018       Family History   Problem Relation Age of Onset   • Psychiatric Illness Mother    • Alcohol/Drug Mother    • Diabetes Father         type 1   • Alcohol/Drug Father    • Cancer Neg Hx        She  has a past medical history of Asthma, Depression, Endometriosis, Heart burn, Herpes genitalia, Migraine aura without headache, and Seizure disorder ([facility]). She also has no past medical history of Seizure ([facility]).  She  has a past surgical history that includes gyn surgery (2014); vaginal hysterectomy scope total (Bilateral, 4/21/2017); cystoscopy (4/21/2017); pelviscopy (N/A, 7/9/2018); exam under anesthesia (N/A, 7/9/2018); laparoscopic lysis of adhesions (Left, 7/9/2018); and dental extraction(s).       Objective:   Vitals obtained by patient:  LMP 04/04/2017     Physical Exam:  Constitutional: Alert, no distress, well-groomed.  Skin: No rashes in visible areas.  Eye: Round. Conjunctiva clear, lids normal. No icterus.   ENMT: Lips pink without lesions, good dentition, moist mucous membranes. Phonation normal.  Neck: No masses, no thyromegaly. Moves freely without pain.  CV: Pulse as reported by patient  Respiratory: Unlabored respiratory effort, no cough or audible wheeze  Psych: Alert and oriented x3, normal affect and mood.       Assessment and Plan:   The following treatment plan was discussed:     1. Sleep disorder    2. Other  eczema        Follow-up: 1.  Trial of trazodone 50 mg nightly  2.  Dermatology referral for worsening eczema  3.  Revisit with me in 1 month    Face to Face Video Visit:   I spent 20 minutes with patient/guardian and I conducted this visit with audio and video present.  Samy Martinez M.D.

## 2021-04-28 ENCOUNTER — TELEMEDICINE (OUTPATIENT)
Dept: NEUROLOGY | Facility: MEDICAL CENTER | Age: 31
End: 2021-04-28
Attending: NURSE PRACTITIONER
Payer: MEDICAID

## 2021-04-28 VITALS — WEIGHT: 180 LBS | HEIGHT: 64 IN | BODY MASS INDEX: 30.73 KG/M2

## 2021-04-28 DIAGNOSIS — G43.419 INTRACTABLE HEMIPLEGIC MIGRAINE WITHOUT STATUS MIGRAINOSUS: ICD-10-CM

## 2021-04-28 PROCEDURE — 99213 OFFICE O/P EST LOW 20 MIN: CPT | Mod: 95,CR | Performed by: NURSE PRACTITIONER

## 2021-04-28 ASSESSMENT — FIBROSIS 4 INDEX: FIB4 SCORE: 0.56

## 2021-04-28 NOTE — PROGRESS NOTES
"Virtual Visit: Established Patient   This visit was conducted via Zoom using secure and encrypted videoconferencing technology. The patient was in a private location in the state of [place].    The patient's identity was confirmed and verbal consent was obtained for this virtual visit.    Subjective:   CC: Chronic Migraine      Chief Complaint   Patient presents with   • Follow-Up     Chronic migraine       Corine Baugh is a 30 y.o. female presenting for evaluation and management of:      Chronic Migraine  The use of Emgality has been helping relieve her migraines.  Used the booster dose of Emgality in February then did the 2nd dose one month ago in March.    Only had 2 migraines in the 2 months she used the Emgality.  They have not been as severe or as intense.    Headaches were occurring nearly every day.  After the first injection set she was headache free-- tearful about the headache relief recognized after the first injections.    Eczema flared up on her torso, neck, and into her hair line.  She attributed the eczema to using the Emgality (injected abdomen both times).  Thus, she stopped using the Emgality and has recognized that she has had an increase in migraines in the past few weeks.    Typical headache: Looses complete vision, numbness in the face and arm.  Nausea, vomiting.      Sumatriptan NS-- caused jaw tightening and severe vomiting.  She would prefer to have a horrible migraine than ever use this product again.      ROS   Denies any recent fevers or chills. No nausea or vomiting. No chest pains or shortness of breath.     Allergies   Allergen Reactions   • Avocado Nausea and Swelling     mouth tingling itchy throat    • Banana Nausea and Swelling     mouth tingling itchy throat    • Bee Anaphylaxis   • Betadine [Povidone Iodine] Anaphylaxis     Told by MD not to use   • Clarithromycin Hives   • Demerol      \"I go crazy.\"   • Eggplant Nausea and Swelling     mouth tingling itchy throat    • " "Iodine Anaphylaxis     Told by MD not to use   • Lavandin Oil Swelling     Lip swelling     • Macrobid [Nitrofurantoin] Swelling   • Mushroom Extract Complex Nausea and Swelling     mouth tingling itchy throat    • Shellfish Allergy Anaphylaxis   • Hydrocodone Vomiting     No vicodin-percocet ok     • Latex Rash     Spreads beyond contact site   • Montelukast [Singulair] Unspecified     Felt \"mean\"       Current medicines (including changes today)  Current Outpatient Medications   Medication Sig Dispense Refill   • traZODone (DESYREL) 50 MG Tab Take 1 tablet by mouth at bedtime. 30 tablet 3   • EMGALITY 120 MG/ML Solution Auto-injector INJECT 1 PEN SUBCUTANEOUSLY ONCE MONTHLY     • sumatriptan (IMITREX) 20 MG/ACT nasal spray Use one dose at onset of headache, may repeat dose in 2 hours if headache is unrelieved.  Do not exceed more than 2 doses in 24 hours. 6 Each 2   • acyclovir (ZOVIRAX) 400 MG tablet TAKE 1 TABLET BY MOUTH TWICE A DAY 60 tablet 10   • albuterol 108 (90 Base) MCG/ACT Aero Soln inhalation aerosol Inhale 2 Puffs every four hours as needed for Shortness of Breath. 1 Each 11   • sumatriptan (IMITREX) 20 MG/ACT nasal spray Administer 1 Spray into affected nostril(S) as needed for Migraine (at onset of migraine.  May repeat one hour later if unrelieved.). 6 Each 2   • lamotrigine (LAMICTAL) 150 MG tablet Take 150 mg by mouth 2 times a day.     • Ketorolac Tromethamine (SPRIX) 15.75 MG/SPRAY Solution Administer 1-2 Sprays into affected nostril(S) one time as needed (at onset of migraine.) for up to 1 dose. 5 Each 2   • acetaminophen (TYLENOL) 500 MG Tab Take 1,000 mg by mouth every 6 hours as needed.     • ibuprofen (MOTRIN) 800 MG Tab Take 800 mg by mouth every 8 hours as needed.       No current facility-administered medications for this visit.       Patient Active Problem List    Diagnosis Date Noted   • Intractable hemiplegic migraine without status migrainosus 01/13/2021   • Urinary incontinence " "05/07/2020   • Chronic intractable headache 05/07/2020   • Anaphylactic shock due to shellfish 05/07/2020   • HLA B27 (HLA B27 positive) 07/09/2019   • Multiple nevi 07/01/2019   • History of herpes genitalis 12/24/2018   • Cocaine abuse in remission (HCC) 12/24/2018   • Mild intermittent asthma 12/24/2018   • Chronic tension headache 12/21/2018   • Anxiety 12/21/2018   • Endometriosis 12/21/2018   • Sprain of deltoid ligament of left ankle 12/21/2018   • Orthopnea 12/21/2018   • Snoring 12/21/2018   • Pelvic pain in female 07/09/2018   • Bipolar depression (HCC) 01/22/2018       Family History   Problem Relation Age of Onset   • Psychiatric Illness Mother    • Alcohol/Drug Mother    • Diabetes Father         type 1   • Alcohol/Drug Father    • Cancer Neg Hx        She  has a past medical history of Asthma, Depression, Endometriosis, Heart burn, Herpes genitalia, Migraine aura without headache, and Seizure disorder ([facility]). She also has no past medical history of Seizure ([facility]).  She  has a past surgical history that includes gyn surgery (2014); vaginal hysterectomy scope total (Bilateral, 4/21/2017); cystoscopy (4/21/2017); pelviscopy (N/A, 7/9/2018); exam under anesthesia (N/A, 7/9/2018); laparoscopic lysis of adhesions (Left, 7/9/2018); and dental extraction(s).       Objective:   Ht 1.626 m (5' 4.02\")   Wt 81.6 kg (180 lb)   LMP 04/04/2017   BMI 30.88 kg/m²     Physical Exam:  Constitutional: Alert, no distress, well-groomed.  Skin: No rashes in visible areas.  Eye: Round. Conjunctiva clear, lids normal. No icterus.   ENMT: Lips pink without lesions, good dentition, moist mucous membranes. Phonation normal.  Neck: No masses, no thyromegaly. Moves freely without pain.  Respiratory: Unlabored respiratory effort, no cough or audible wheeze  Psych: Alert and oriented x3, normal affect and mood.       Assessment and Plan:   The following treatment plan was discussed:     Complex Migraine with hemiplegic " symptoms:  Typical headache: Looses complete vision, numbness in the face and arm.  Nausea, vomiting.    Last seen in the ED on 12/28/2020 for a migraine.    Utilized two doses of Emgality but has stopped this month as she attributed a flare-up of eczema to the usage of this new drug.     She was a super responder to the Emgality product with barely any migraine after her first injection booster set.  However, the effectiveness of her 2nd injection in March is wearing-off and is experiencing an increase in migraine headaches.      Last Brain MRI was normal.     Counseled regarding migraine diagnosis and appropriate usage of rescue medication for complex migraines including occular aura and/or hemiplegic symptoms.     Tried and failed: topamax, propranolol, lamotrigine, gabapentin, sumatriptan, maxalt  Has been taking fioricet 2 tablets every 6 hours.      Sumatriptan NS-- untoward side effects.    Educated regarding fioricet tablet prescription.    Updated Rx for Emgality to be faxed to her pharmacy.  New Rx for Sprix NS to be sent and authorized.    Consider Botox injection for chronic migraine in the future.    Follow-up: Return in 6 months or sooner if needed.

## 2021-04-29 ENCOUNTER — PATIENT MESSAGE (OUTPATIENT)
Dept: MEDICAL GROUP | Facility: MEDICAL CENTER | Age: 31
End: 2021-04-29

## 2021-04-29 RX ORDER — GALCANEZUMAB 120 MG/ML
INJECTION, SOLUTION SUBCUTANEOUS
Qty: 1 ML | Refills: 2 | Status: SHIPPED | OUTPATIENT
Start: 2021-04-29 | End: 2021-07-06 | Stop reason: SDUPTHER

## 2021-05-03 RX ORDER — KETOROLAC TROMETHAMINE 15.75 MG/1
SPRAY, METERED NASAL
Qty: 5 EACH | Refills: 0 | Status: SHIPPED | OUTPATIENT
Start: 2021-05-03 | End: 2023-10-16

## 2021-05-05 ENCOUNTER — PATIENT MESSAGE (OUTPATIENT)
Dept: MEDICAL GROUP | Facility: MEDICAL CENTER | Age: 31
End: 2021-05-05

## 2021-05-05 DIAGNOSIS — L30.9 DERMATITIS OF LIP: ICD-10-CM

## 2021-05-06 RX ORDER — TRIAMCINOLONE ACETONIDE 1 MG/G
1 OINTMENT TOPICAL 2 TIMES DAILY
Qty: 15 G | Refills: 0 | Status: SHIPPED | OUTPATIENT
Start: 2021-05-06 | End: 2023-10-16

## 2021-05-10 DIAGNOSIS — R51.9 CHRONIC INTRACTABLE HEADACHE, UNSPECIFIED HEADACHE TYPE: ICD-10-CM

## 2021-05-10 DIAGNOSIS — G89.29 CHRONIC INTRACTABLE HEADACHE, UNSPECIFIED HEADACHE TYPE: ICD-10-CM

## 2021-05-10 DIAGNOSIS — G43.419 INTRACTABLE HEMIPLEGIC MIGRAINE WITHOUT STATUS MIGRAINOSUS: ICD-10-CM

## 2021-05-20 RX ORDER — KETOROLAC TROMETHAMINE 10 MG/1
10 TABLET, FILM COATED ORAL EVERY 6 HOURS PRN
Qty: 30 TABLET | Refills: 0 | Status: SHIPPED | OUTPATIENT
Start: 2021-05-20 | End: 2021-09-02

## 2021-05-20 RX ORDER — KETOROLAC TROMETHAMINE 30 MG/ML
30 INJECTION, SOLUTION INTRAMUSCULAR; INTRAVENOUS ONCE
Qty: 6 ML | Refills: 1 | Status: SHIPPED | OUTPATIENT
Start: 2021-05-20 | End: 2021-05-24 | Stop reason: SDUPTHER

## 2021-05-24 PROCEDURE — RXMED WILLOW AMBULATORY MEDICATION CHARGE: Performed by: NURSE PRACTITIONER

## 2021-05-24 RX ORDER — KETOROLAC TROMETHAMINE 30 MG/ML
30 INJECTION, SOLUTION INTRAMUSCULAR; INTRAVENOUS ONCE
Qty: 6 ML | Refills: 1 | Status: SHIPPED | OUTPATIENT
Start: 2021-05-24 | End: 2021-05-24

## 2021-05-24 RX ORDER — SYRINGE W-NEEDLE,DISPOSAB,3 ML 25GX5/8"
SYRINGE, EMPTY DISPOSABLE MISCELLANEOUS
Qty: 10 EACH | Refills: 2 | Status: SHIPPED | OUTPATIENT
Start: 2021-05-24

## 2021-05-24 RX ORDER — KETOROLAC TROMETHAMINE 10 MG/1
10 TABLET, FILM COATED ORAL EVERY 6 HOURS PRN
Qty: 30 TABLET | Refills: 0 | Status: CANCELLED | OUTPATIENT
Start: 2021-05-24

## 2021-05-24 RX ORDER — KETOROLAC TROMETHAMINE 30 MG/ML
30 INJECTION, SOLUTION INTRAMUSCULAR; INTRAVENOUS ONCE
Qty: 6 ML | Refills: 1 | OUTPATIENT
Start: 2021-05-24 | End: 2021-05-24

## 2021-05-24 NOTE — TELEPHONE ENCOUNTER
"Received request via: Pharmacy    Was the patient seen in the last year in this department? Yes    Does the patient have an active prescription (recently filled or refills available) for medication(s) requested? No       Note from pharmacy:    \"[facility] CANT FILL [facility], PLEASE SEND NEW RX FOR US TO FILL\"      "

## 2021-05-24 NOTE — TELEPHONE ENCOUNTER
"MyChart from patient\"     \"[facility] is the only one that can get the vials filled, they sent a request to you for that prescription but they forgot to ask for a prescription for the needles and cant sell without one, would you mind sending them that as well :) thank you so much \"    Rx needs to go to [facility].   "

## 2021-05-26 ENCOUNTER — PHARMACY VISIT (OUTPATIENT)
Dept: PHARMACY | Facility: MEDICAL CENTER | Age: 31
End: 2021-05-26
Payer: COMMERCIAL

## 2021-06-02 DIAGNOSIS — R51.9 CHRONIC INTRACTABLE HEADACHE, UNSPECIFIED HEADACHE TYPE: ICD-10-CM

## 2021-06-02 DIAGNOSIS — G89.29 CHRONIC INTRACTABLE HEADACHE, UNSPECIFIED HEADACHE TYPE: ICD-10-CM

## 2021-06-03 RX ORDER — BUTALBITAL, ACETAMINOPHEN AND CAFFEINE 50; 325; 40 MG/1; MG/1; MG/1
TABLET ORAL
Qty: 60 TABLET | Refills: 0 | Status: SHIPPED | OUTPATIENT
Start: 2021-06-03 | End: 2021-10-18

## 2021-06-29 ENCOUNTER — APPOINTMENT (OUTPATIENT)
Dept: MEDICAL GROUP | Facility: MEDICAL CENTER | Age: 31
End: 2021-06-29
Attending: FAMILY MEDICINE
Payer: MEDICAID

## 2021-07-06 DIAGNOSIS — R51.9 CHRONIC INTRACTABLE HEADACHE, UNSPECIFIED HEADACHE TYPE: ICD-10-CM

## 2021-07-06 DIAGNOSIS — G89.29 CHRONIC INTRACTABLE HEADACHE, UNSPECIFIED HEADACHE TYPE: ICD-10-CM

## 2021-07-06 RX ORDER — GALCANEZUMAB 120 MG/ML
INJECTION, SOLUTION SUBCUTANEOUS
Qty: 1 ML | Refills: 2 | Status: SHIPPED | OUTPATIENT
Start: 2021-07-06 | End: 2021-12-22

## 2021-07-06 NOTE — TELEPHONE ENCOUNTER
Received request via: Pharmacy    Was the patient seen in the last year in this department? Yes    Does the patient have an active prescription (recently filled or refills available) for medication(s) requested? No       Last seen by Silva Keyes 04/2021   Next appt not scheduled. Pt advised that appt is needed to continue refills/

## 2021-07-08 RX ORDER — LAMOTRIGINE 150 MG/1
TABLET ORAL
Qty: 60 TABLET | Refills: 5 | Status: SHIPPED | OUTPATIENT
Start: 2021-07-08 | End: 2022-02-03 | Stop reason: SDUPTHER

## 2021-08-02 ENCOUNTER — TELEMEDICINE (OUTPATIENT)
Dept: MEDICAL GROUP | Facility: MEDICAL CENTER | Age: 31
End: 2021-08-02
Attending: FAMILY MEDICINE
Payer: MEDICAID

## 2021-08-02 DIAGNOSIS — F41.9 ANXIETY: ICD-10-CM

## 2021-08-02 DIAGNOSIS — G89.29 CHRONIC INTRACTABLE HEADACHE, UNSPECIFIED HEADACHE TYPE: ICD-10-CM

## 2021-08-02 DIAGNOSIS — G47.9 SLEEP DISORDER: ICD-10-CM

## 2021-08-02 DIAGNOSIS — R51.9 CHRONIC INTRACTABLE HEADACHE, UNSPECIFIED HEADACHE TYPE: ICD-10-CM

## 2021-08-02 PROCEDURE — 99214 OFFICE O/P EST MOD 30 MIN: CPT | Performed by: FAMILY MEDICINE

## 2021-08-02 RX ORDER — ALPRAZOLAM 0.5 MG/1
TABLET ORAL
Qty: 30 TABLET | Refills: 1 | Status: SHIPPED | OUTPATIENT
Start: 2021-08-02 | End: 2021-09-02

## 2021-08-02 RX ORDER — TRAZODONE HYDROCHLORIDE 50 MG/1
50 TABLET ORAL
Qty: 30 TABLET | Refills: 6 | Status: SHIPPED | OUTPATIENT
Start: 2021-08-02 | End: 2022-04-19

## 2021-08-02 RX ORDER — KETOROLAC TROMETHAMINE 30 MG/ML
INJECTION, SOLUTION INTRAMUSCULAR; INTRAVENOUS
COMMUNITY
Start: 2021-06-11 | End: 2021-10-18

## 2021-08-02 NOTE — PROGRESS NOTES
Telemedicine Video Visit: Established Patient   This Remote Face to Face encounter was conducted via Zoom. Given the importance of social distancing and other strategies recommended to reduce the risk of COVID-19 transmission, I am providing medical care to this patient via audio/video visit in place of an in person visit at the request of the patient. Verbal consent to telehealth, risks, benefits, and consequences were discussed. Patient retains the right to withdraw at any time. All existing confidentiality protections apply. The patient has access to all transmitted medical information. No dissemination of any patient images or information to other entities without further written consent.  Subjective:   No chief complaint on file.      Corine Baugh is a 31 y.o. female presenting for evaluation and management of:    1.  Sleep disorder-patient requests refill of trazodone 50 mg at bedtime.  She reports this is effective at helping her fall and stay asleep.  2.  Anxiety-patient notes that she will use Xanax 0.5 mg once daily on an intermittent basis for anxiety.  She just moved and has noted increased stress over the past 3 weeks which has resulted in more consistent doses once each day recently.  She denies any confusion or suicidal ideation.  3.  Chronic headaches-patient reports that her headaches have been well controlled using oral Toradol and an occasional home injection that was ordered by Dr. Ruth Feng.  That provider has left [facility].  Patient is being asked to be referred to get linked with a new provider to continue her medications.    ROS   Denies any recent fevers or chills. No nausea or vomiting. No chest pains or shortness of breath.     Allergies   Allergen Reactions   • Avocado Nausea and Swelling     mouth tingling itchy throat    • Banana Nausea and Swelling     mouth tingling itchy throat    • Bee Anaphylaxis   • Betadine [Povidone Iodine] Anaphylaxis     Told by MD not to  "use   • Clarithromycin Hives   • Demerol      \"I go crazy.\"   • Eggplant Nausea and Swelling     mouth tingling itchy throat    • Iodine Anaphylaxis     Told by MD not to use   • Lavandin Oil Swelling     Lip swelling     • Macrobid [Nitrofurantoin] Swelling   • Mushroom Extract Complex Nausea and Swelling     mouth tingling itchy throat    • Shellfish Allergy Anaphylaxis   • Hydrocodone Vomiting     No vicodin-percocet ok     • Latex Rash     Spreads beyond contact site   • Montelukast [Singulair] Unspecified     Felt \"mean\"       Current medicines (including changes today)  Current Outpatient Medications   Medication Sig Dispense Refill   • lamotrigine (LAMICTAL) 150 MG tablet TAKE 1 TABLET BY MOUTH TWICE A DAY 60 tablet 5   • EMGALITY 120 MG/ML Solution Auto-injector Use 1ml under the skin once every 30 days. 1 mL 2   • Syringe/Needle, Disp, (SYRINGE [id number]/38PW9-0/2\") 22G X 1-1/2\" 3 ML Misc Use to self-inject ketorolac. 10 Each 2   • ketorolac (TORADOL) 10 MG Tab Take 1 tablet by mouth every 6 hours as needed for Moderate Pain. 30 tablet 0   • triamcinolone acetonide (KENALOG) 0.1 % Ointment Apply 1 Application topically 2 times a day. 15 g 0   • Ketorolac Tromethamine (SPRIX) 15.75 MG/SPRAY Solution Use 1-2 sprays at onset of migraine.  Do not exceed more than 2 sprays in 24 hours. 5 Each 0   • traZODone (DESYREL) 50 MG Tab Take 1 tablet by mouth at bedtime. 30 tablet 3   • acyclovir (ZOVIRAX) 400 MG tablet TAKE 1 TABLET BY MOUTH TWICE A DAY 60 tablet 10   • albuterol 108 (90 Base) MCG/ACT Aero Soln inhalation aerosol Inhale 2 Puffs every four hours as needed for Shortness of Breath. 1 Each 11   • acetaminophen (TYLENOL) 500 MG Tab Take 1,000 mg by mouth every 6 hours as needed.     • ibuprofen (MOTRIN) 800 MG Tab Take 800 mg by mouth every 8 hours as needed.       No current facility-administered medications for this visit.       Patient Active Problem List    Diagnosis Date Noted   • Intractable hemiplegic " migraine without status migrainosus 01/13/2021   • Urinary incontinence 05/07/2020   • Chronic intractable headache 05/07/2020   • Anaphylactic shock due to shellfish 05/07/2020   • HLA B27 (HLA B27 positive) 07/09/2019   • Multiple nevi 07/01/2019   • History of herpes genitalis 12/24/2018   • Cocaine abuse in remission (HCC) 12/24/2018   • Mild intermittent asthma 12/24/2018   • Chronic tension headache 12/21/2018   • Anxiety 12/21/2018   • Endometriosis 12/21/2018   • Sprain of deltoid ligament of left ankle 12/21/2018   • Orthopnea 12/21/2018   • Snoring 12/21/2018   • Pelvic pain in female 07/09/2018   • Bipolar depression ([facility]) 01/22/2018       Family History   Problem Relation Age of Onset   • Psychiatric Illness Mother    • Alcohol/Drug Mother    • Diabetes Father         type 1   • Alcohol/Drug Father    • Cancer Neg Hx        She  has a past medical history of Asthma, Depression, Endometriosis, Heart burn, Herpes genitalia, Migraine 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 She also has no past medical history of Seizure ([facility]).  She  has a past surgical history that includes gyn surgery (2014); vaginal hysterectomy scope total (Bilateral, 4/21/2017); cystoscopy (4/21/2017); pelviscopy (N/A, 7/9/2018); exam under anesthesia (N/A, 7/9/2018); laparoscopic lysis of adhesions (Left, 7/9/2018); and dental extraction(s).       Objective:   Vitals obtained by patient:  [facility] 04/04/2017     Physical Exam:  Constitutional: Alert, no distress, well-groomed.  Skin: No rashes in visible areas.  Eye: Round. Conjunctiva clear, lids normal. No icterus.   ENMT: Lips pink without lesions, good dentition, moist mucous membranes. Phonation normal.  Neck: No masses, no thyromegaly. Moves freely without pain.  CV: Pulse as reported by patient  Respiratory: Unlabored respiratory effort, no cough or audible wheeze  Psych: Alert and oriented x3, normal affect and mood.       Assessment and Plan:   The following treatment  plan was discussed:     1. Anxiety    2. Sleep disorder    3. Chronic intractable headache, unspecified headache type        Follow-up: 1.  Revisit in approximately 3 months  2.  New referral back to [facility]  3.  Renew tramadol, Xanax    Face to Face Video Visit:   I spent 15 minutes with patient/guardian and I conducted this visit with audio and video present.  Samy Martinez M.D.

## 2021-08-17 ENCOUNTER — PATIENT MESSAGE (OUTPATIENT)
Dept: MEDICAL GROUP | Facility: MEDICAL CENTER | Age: 31
End: 2021-08-17

## 2021-08-17 DIAGNOSIS — J45.20 MILD INTERMITTENT ASTHMA WITHOUT COMPLICATION: ICD-10-CM

## 2021-08-17 RX ORDER — ALBUTEROL SULFATE 90 UG/1
2 AEROSOL, METERED RESPIRATORY (INHALATION) EVERY 4 HOURS PRN
Qty: 1 EACH | Refills: 11 | Status: SHIPPED | OUTPATIENT
Start: 2021-08-17 | End: 2022-09-10 | Stop reason: SDUPTHER

## 2021-08-25 ENCOUNTER — PATIENT MESSAGE (OUTPATIENT)
Dept: MEDICAL GROUP | Facility: MEDICAL CENTER | Age: 31
End: 2021-08-25

## 2021-09-02 NOTE — TELEPHONE ENCOUNTER
Received request via: Pharmacy    Was the patient seen in the last year in this department? Yes    Does the patient have an active prescription (recently filled or refills available) for medication(s) requested? Yes.   Pt scheduled with [facility]

## 2021-09-03 RX ORDER — KETOROLAC TROMETHAMINE 10 MG/1
TABLET, FILM COATED ORAL
Qty: 20 TABLET | Refills: 1 | Status: SHIPPED | OUTPATIENT
Start: 2021-09-03 | End: 2022-09-10 | Stop reason: SDUPTHER

## 2021-10-16 DIAGNOSIS — G89.29 CHRONIC INTRACTABLE HEADACHE, UNSPECIFIED HEADACHE TYPE: ICD-10-CM

## 2021-10-16 DIAGNOSIS — R51.9 CHRONIC INTRACTABLE HEADACHE, UNSPECIFIED HEADACHE TYPE: ICD-10-CM

## 2021-10-18 RX ORDER — KETOROLAC TROMETHAMINE 30 MG/ML
INJECTION, SOLUTION INTRAMUSCULAR; INTRAVENOUS
Qty: 6 ML | Refills: 0 | Status: SHIPPED | OUTPATIENT
Start: 2021-10-18 | End: 2022-09-10 | Stop reason: SDUPTHER

## 2021-10-18 RX ORDER — BUTALBITAL, ACETAMINOPHEN AND CAFFEINE 50; 325; 40 MG/1; MG/1; MG/1
TABLET ORAL
Qty: 60 TABLET | Refills: 0 | Status: SHIPPED | OUTPATIENT
Start: 2021-10-18 | End: 2021-12-23

## 2021-10-18 NOTE — TELEPHONE ENCOUNTER
Received request via: Pharmacy    Was the patient seen in the last year in this department? Yes    Does the patient have an active prescription (recently filled or refills available) for medication(s) requested? Yes. Was Silva pt, pt advised to establish

## 2021-12-13 ENCOUNTER — PATIENT MESSAGE (OUTPATIENT)
Dept: MEDICAL GROUP | Facility: MEDICAL CENTER | Age: 31
End: 2021-12-13

## 2021-12-21 DIAGNOSIS — G89.29 CHRONIC INTRACTABLE HEADACHE, UNSPECIFIED HEADACHE TYPE: ICD-10-CM

## 2021-12-21 DIAGNOSIS — T78.02XS ANAPHYLACTIC SHOCK DUE TO SHELLFISH, SEQUELA: ICD-10-CM

## 2021-12-21 DIAGNOSIS — R51.9 CHRONIC INTRACTABLE HEADACHE, UNSPECIFIED HEADACHE TYPE: ICD-10-CM

## 2021-12-22 RX ORDER — GALCANEZUMAB 120 MG/ML
INJECTION, SOLUTION SUBCUTANEOUS
Qty: 1 ML | Refills: 0 | Status: SHIPPED | OUTPATIENT
Start: 2021-12-22 | End: 2022-02-03 | Stop reason: SDUPTHER

## 2021-12-23 ENCOUNTER — TELEPHONE (OUTPATIENT)
Dept: NEUROLOGY | Facility: MEDICAL CENTER | Age: 31
End: 2021-12-23

## 2021-12-23 RX ORDER — EPINEPHRINE 0.3 MG/.3ML
INJECTION SUBCUTANEOUS
Qty: 2 EACH | Refills: 3 | Status: SHIPPED | OUTPATIENT
Start: 2021-12-23 | End: 2022-09-10 | Stop reason: SDUPTHER

## 2021-12-23 RX ORDER — BUTALBITAL, ACETAMINOPHEN AND CAFFEINE 50; 325; 40 MG/1; MG/1; MG/1
TABLET ORAL
Qty: 60 TABLET | Refills: 0 | Status: SHIPPED | OUTPATIENT
Start: 2021-12-23 | End: 2022-01-23

## 2021-12-23 NOTE — TELEPHONE ENCOUNTER
Emgality 120mg/ml Solution Auto-Injector    Refill request rec'd via [facility], ran TC via [employer], TC paid copay $0.00 #1ml/30 DS Max 30 DS notifying liaison Sandy Méndez. PA expires 07/06/2022.

## 2022-01-11 ENCOUNTER — TELEPHONE (OUTPATIENT)
Dept: MEDICAL GROUP | Facility: MEDICAL CENTER | Age: 32
End: 2022-01-11

## 2022-02-03 ENCOUNTER — TELEMEDICINE (OUTPATIENT)
Dept: MEDICAL GROUP | Facility: MEDICAL CENTER | Age: 32
End: 2022-02-03
Attending: FAMILY MEDICINE
Payer: MEDICAID

## 2022-02-03 DIAGNOSIS — G47.9 SLEEP DISORDER: ICD-10-CM

## 2022-02-03 DIAGNOSIS — G89.29 CHRONIC INTRACTABLE HEADACHE, UNSPECIFIED HEADACHE TYPE: ICD-10-CM

## 2022-02-03 DIAGNOSIS — R51.9 CHRONIC INTRACTABLE HEADACHE, UNSPECIFIED HEADACHE TYPE: ICD-10-CM

## 2022-02-03 DIAGNOSIS — F41.9 ANXIETY: ICD-10-CM

## 2022-02-03 PROCEDURE — 99214 OFFICE O/P EST MOD 30 MIN: CPT | Performed by: FAMILY MEDICINE

## 2022-02-03 RX ORDER — ESZOPICLONE 1 MG/1
1 TABLET, FILM COATED ORAL
Qty: 30 TABLET | Refills: 2 | Status: SHIPPED | OUTPATIENT
Start: 2022-02-03 | End: 2022-03-05

## 2022-02-03 RX ORDER — GALCANEZUMAB 120 MG/ML
INJECTION, SOLUTION SUBCUTANEOUS
Qty: 1 ML | Refills: 0 | Status: SHIPPED | OUTPATIENT
Start: 2022-02-03 | End: 2022-04-19

## 2022-02-03 RX ORDER — ALPRAZOLAM 1 MG/1
1 TABLET ORAL NIGHTLY PRN
Qty: 30 TABLET | Refills: 1 | Status: SHIPPED | OUTPATIENT
Start: 2022-02-03 | End: 2022-08-09

## 2022-02-03 RX ORDER — LAMOTRIGINE 150 MG/1
150 TABLET ORAL 2 TIMES DAILY
Qty: 60 TABLET | Refills: 5 | Status: SHIPPED | OUTPATIENT
Start: 2022-02-03 | End: 2023-10-16 | Stop reason: SDUPTHER

## 2022-02-04 ENCOUNTER — PATIENT MESSAGE (OUTPATIENT)
Dept: MEDICAL GROUP | Facility: MEDICAL CENTER | Age: 32
End: 2022-02-04

## 2022-02-04 DIAGNOSIS — G47.9 SLEEP DISORDER: ICD-10-CM

## 2022-02-04 NOTE — PROGRESS NOTES
Telemedicine Video Visit: Established Patient   This Remote Face to Face encounter was conducted via Zoom. Given the importance of social distancing and other strategies recommended to reduce the risk of COVID-19 transmission, I am providing medical care to this patient via audio/video visit in place of an in person visit at the request of the patient. Verbal consent to telehealth, risks, benefits, and consequences were discussed. Patient retains the right to withdraw at any time. All existing confidentiality protections apply. The patient has access to all transmitted medical information. No dissemination of any patient images or information to other entities without further written consent.  Subjective:     Chief Complaint   Patient presents with   • Medication Problem       Corine Baugh is a 31 y.o. female presenting for evaluation and management of:    1.  Anxiety-patient reports that recently the Xanax 0.5 mg strength is not helping with acute episodes of anxiety as well as it used to.  She reports that she will have severe peaks of anxiety about 3-4 times per week.  Some increa stresses associated with her children being bullied and also recently having Covid herself.  2.  Severe headaches-patient reports she has had excellent results taking Emgality 1 injection/month.  Unfortunately she lost her neurologist and is on a long wait list.  She requests that we refill that medication.  She reports that she might have a headache every month or so where as before she is having severe headaches 3 times per week sending her to the emergency room.  Not reporting any ill effects from the medication.  3.  Sleep disorder-patient reports that she discontinued trazodone because it was allowing her to have up to 2 hours of sleep latency then she would probably sleep for 6 hours with several trips to the bathroom through the night.  And in the morning she would awaken feeling unusually anxious first thing in the  "morning along with a morning headache.  The morning anxiety and headache disappeared when she stopped the trazodone 50 mg.    ROS   Denies any recent fevers or chills. No nausea or vomiting. No chest pains or shortness of breath.     Allergies   Allergen Reactions   • Avocado Nausea and Swelling     mouth tingling itchy throat    • Banana Nausea and Swelling     mouth tingling itchy throat    • Bee Anaphylaxis   • Betadine [Povidone Iodine] Anaphylaxis     Told by MD not to use   • Clarithromycin Hives   • Demerol      \"I go crazy.\"   • Eggplant Nausea and Swelling     mouth tingling itchy throat    • Iodine Anaphylaxis     Told by MD not to use   • Lavandin Oil Swelling     Lip swelling     • Macrobid [Nitrofurantoin] Swelling   • Mushroom Extract Complex Nausea and Swelling     mouth tingling itchy throat    • Shellfish Allergy Anaphylaxis   • Hydrocodone Vomiting     No vicodin-percocet ok     • Latex Rash     Spreads beyond contact site   • Montelukast [Singulair] Unspecified     Felt \"mean\"       Current medicines (including changes today)  Current Outpatient Medications   Medication Sig Dispense Refill   • lamotrigine (LAMICTAL) 150 MG tablet Take 1 Tablet by mouth 2 times a day. 60 Tablet 5   • EMGALITY 120 MG/ML Solution Auto-injector Inject subcutaneously every 30 days 1 mL 0   • eszopiclone (LUNESTA) 1 MG Tab tablet Take 1 Tablet by mouth at bedtime as needed for Insomnia for up to 30 days. 30 Tablet 2   • ALPRAZolam (XANAX) 1 MG Tab Take 1 Tablet by mouth at bedtime as needed for Anxiety for up to 30 days. 30 Tablet 1   • EPINEPHrine (EPIPEN) 0.3 MG/0.3ML Solution Auto-injector solution for injection INJECT 0.3 ML (1 SYRINGE) INTO THE SHOULDER, THIGH, OR BUTTOCKS ONE TIME FOR 1 DOSE. MYLAN PER INS 2 Each 3   • ketorolac (TORADOL) 30 MG/ML Solution INJECT 1 ML INTO THE SHOULDER, THIGH, OR BUTTOCKS ONE TIME FOR 1 DOSE. 6 mL 0   • ketorolac (TORADOL) 10 MG Tab TAKE 1 TABLET BY MOUTH EVERY 6 HOURS AS NEEDED " "FOR PAIN (MODERATE PAIN) 20 Tablet 1   • albuterol 108 (90 Base) MCG/ACT Aero Soln inhalation aerosol Inhale 2 Puffs every four hours as needed for Shortness of Breath. 1 Each 11   • traZODone (DESYREL) 50 MG Tab Take 1 tablet by mouth at bedtime. 30 tablet 6   • Syringe/Needle, Disp, (SYRINGE 3CC/67PR4-9/2\") 22G X 1-1/2\" 3 ML Misc Use to self-inject ketorolac. 10 Each 2   • triamcinolone acetonide (KENALOG) 0.1 % Ointment Apply 1 Application topically 2 times a day. 15 g 0   • Ketorolac Tromethamine (SPRIX) 15.75 MG/SPRAY Solution Use 1-2 sprays at onset of migraine.  Do not exceed more than 2 sprays in 24 hours. 5 Each 0   • acetaminophen (TYLENOL) 500 MG Tab Take 1,000 mg by mouth every 6 hours as needed.     • ibuprofen (MOTRIN) 800 MG Tab Take 800 mg by mouth every 8 hours as needed.       No current facility-administered medications for this visit.       Patient Active Problem List    Diagnosis Date Noted   • Intractable hemiplegic migraine without status migrainosus 01/13/2021   • Urinary incontinence 05/07/2020   • Chronic intractable headache 05/07/2020   • Anaphylactic shock due to shellfish 05/07/2020   • HLA B27 (HLA B27 positive) 07/09/2019   • Multiple nevi 07/01/2019   • History of herpes genitalis 12/24/2018   • Cocaine abuse in remission (HCC) 12/24/2018   • Mild intermittent asthma 12/24/2018   • Chronic tension headache 12/21/2018   • Anxiety 12/21/2018   • Endometriosis 12/21/2018   • Sprain of deltoid ligament of left ankle 12/21/2018   • Orthopnea 12/21/2018   • Snoring 12/21/2018   • Pelvic pain in female 07/09/2018   • Bipolar depression (HCC) 01/22/2018       Family History   Problem Relation Age of Onset   • Psychiatric Illness Mother    • Alcohol/Drug Mother    • Diabetes Father         type 1   • Alcohol/Drug Father    • Cancer Neg Hx        She  has a past medical history of Asthma, Depression, Endometriosis, Heart burn, Herpes genitalia, Migraine aura without headache, and Seizure " disorder (HCC). She also has no past medical history of Seizure (HCC).  She  has a past surgical history that includes gyn surgery (2014); vaginal hysterectomy scope total (Bilateral, 4/21/2017); cystoscopy (4/21/2017); pelviscopy (N/A, 7/9/2018); exam under anesthesia (N/A, 7/9/2018); laparoscopic lysis of adhesions (Left, 7/9/2018); and dental extraction(s).       Objective:   Vitals obtained by patient:  LMP 04/04/2017     Physical Exam:  Constitutional: Alert, no distress, well-groomed.  Skin: No rashes in visible areas.  Eye: Round. Conjunctiva clear, lids normal. No icterus.   ENMT: Lips pink without lesions, good dentition, moist mucous membranes. Phonation normal.  Neck: No masses, no thyromegaly. Moves freely without pain.  CV: Pulse as reported by patient  Respiratory: Unlabored respiratory effort, no cough or audible wheeze  Psych: Alert and oriented x3, normal affect and mood.       Assessment and Plan:   The following treatment plan was discussed:     1. Chronic intractable headache, unspecified headache type  - lamotrigine (LAMICTAL) 150 MG tablet; Take 1 Tablet by mouth 2 times a day.  Dispense: 60 Tablet; Refill: 5  - EMGALITY 120 MG/ML Solution Auto-injector; Inject subcutaneously every 30 days  Dispense: 1 mL; Refill: 0    2. Anxiety  - ALPRAZolam (XANAX) 1 MG Tab; Take 1 Tablet by mouth at bedtime as needed for Anxiety for up to 30 days.  Dispense: 30 Tablet; Refill: 1    3. Sleep disorder  - eszopiclone (LUNESTA) 1 MG Tab tablet; Take 1 Tablet by mouth at bedtime as needed for Insomnia for up to 30 days.  Dispense: 30 Tablet; Refill: 2        Follow-up: 1.  Trial of Lunesta 1 mg at bedtime to assist with sleep  2.  Increase the strength of alprazolam to 1 mg limit 30/month for as needed use of severe anxiety  3.  Renew Emgality 120 mg/mL injection 1 monthly  4.  Follow-up with me in 1 month    Face to Face Video Visit:   I spent 20 minutes with patient/guardian and I conducted this visit with  audio and video present.  Samy Martinez M.D.

## 2022-02-08 RX ORDER — ZOLPIDEM TARTRATE 5 MG/1
5 TABLET ORAL NIGHTLY PRN
Qty: 30 TABLET | Refills: 1 | Status: SHIPPED | OUTPATIENT
Start: 2022-02-08 | End: 2022-03-10

## 2022-03-01 ENCOUNTER — PATIENT MESSAGE (OUTPATIENT)
Dept: MEDICAL GROUP | Facility: MEDICAL CENTER | Age: 32
End: 2022-03-01
Payer: MEDICAID

## 2022-03-01 ENCOUNTER — TELEMEDICINE (OUTPATIENT)
Dept: MEDICAL GROUP | Facility: MEDICAL CENTER | Age: 32
End: 2022-03-01
Attending: FAMILY MEDICINE
Payer: MEDICAID

## 2022-03-01 DIAGNOSIS — G43.419 INTRACTABLE HEMIPLEGIC MIGRAINE WITHOUT STATUS MIGRAINOSUS: ICD-10-CM

## 2022-03-01 DIAGNOSIS — G47.9 SLEEP DISORDER: ICD-10-CM

## 2022-03-01 PROCEDURE — 99213 OFFICE O/P EST LOW 20 MIN: CPT | Performed by: FAMILY MEDICINE

## 2022-03-01 RX ORDER — TEMAZEPAM 15 MG/1
15 CAPSULE ORAL NIGHTLY PRN
Qty: 30 CAPSULE | Refills: 0 | Status: SHIPPED | OUTPATIENT
Start: 2022-03-01 | End: 2022-04-04

## 2022-03-01 NOTE — PROGRESS NOTES
Telemedicine Video Visit: Established Patient   This Remote Face to Face encounter was conducted via Zoom. Given the importance of social distancing and other strategies recommended to reduce the risk of COVID-19 transmission, I am providing medical care to this patient via audio/video visit in place of an in person visit at the request of the patient. Verbal consent to telehealth, risks, benefits, and consequences were discussed. Patient retains the right to withdraw at any time. All existing confidentiality protections apply. The patient has access to all transmitted medical information. No dissemination of any patient images or information to other entities without further written consent.  Subjective:   No chief complaint on file.      Corine Baugh is a 31 y.o. female presenting for evaluation and management of:    1.  Severe hemiplegic migraine headaches-patient reports that she is still able to receive her Emgality prescription however she has been notified by jessica, her new carrier that she will only receive it for 60 days.  At that time we will need to file a prior authorization request on the following basis-patient has failed sumatriptan (exacerbated severe nausea associated with her headaches, Topamax (increased headache intensity), also failed propranolol.  Interestingly she does report that oral or injectable Toradol will oftentimes provide some headache relief although it is not approved for that specific use.  2.  Anxiety-patient has done better with the increase of alprazolam from 0.5 mg up to 1 mg.  This is typically taken at bedtime recently which does help her sleep.  The trial of Lunesta was denied.  She was then placed on Ambien which caused a prolonged complex episode of sleepwalking the first night she took it and she has not taken any further doses.    ROS   Denies any recent fevers or chills. No nausea or vomiting. No chest pains or shortness of breath.     Allergies   Allergen  "Reactions   • Avocado Nausea and Swelling     mouth tingling itchy throat    • Banana Nausea and Swelling     mouth tingling itchy throat    • Bee Anaphylaxis   • Betadine [Povidone Iodine] Anaphylaxis     Told by MD not to use   • Clarithromycin Hives   • Demerol      \"I go crazy.\"   • Eggplant Nausea and Swelling     mouth tingling itchy throat    • Iodine Anaphylaxis     Told by MD not to use   • Lavandin Oil Swelling     Lip swelling     • Macrobid [Nitrofurantoin] Swelling   • Mushroom Extract Complex Nausea and Swelling     mouth tingling itchy throat    • Shellfish Allergy Anaphylaxis   • Hydrocodone Vomiting     No vicodin-percocet ok     • Latex Rash     Spreads beyond contact site   • Montelukast [Singulair] Unspecified     Felt \"mean\"       Current medicines (including changes today)  Current Outpatient Medications   Medication Sig Dispense Refill   • zolpidem (AMBIEN) 5 MG Tab Take 1 Tablet by mouth at bedtime as needed for Sleep for up to 30 days. 30 Tablet 1   • lamotrigine (LAMICTAL) 150 MG tablet Take 1 Tablet by mouth 2 times a day. 60 Tablet 5   • EMGALITY 120 MG/ML Solution Auto-injector Inject subcutaneously every 30 days 1 mL 0   • eszopiclone (LUNESTA) 1 MG Tab tablet Take 1 Tablet by mouth at bedtime as needed for Insomnia for up to 30 days. 30 Tablet 2   • ALPRAZolam (XANAX) 1 MG Tab Take 1 Tablet by mouth at bedtime as needed for Anxiety for up to 30 days. 30 Tablet 1   • EPINEPHrine (EPIPEN) 0.3 MG/0.3ML Solution Auto-injector solution for injection INJECT 0.3 ML (1 SYRINGE) INTO THE SHOULDER, THIGH, OR BUTTOCKS ONE TIME FOR 1 DOSE. MYLAN PER INS 2 Each 3   • ketorolac (TORADOL) 30 MG/ML Solution INJECT 1 ML INTO THE SHOULDER, THIGH, OR BUTTOCKS ONE TIME FOR 1 DOSE. 6 mL 0   • ketorolac (TORADOL) 10 MG Tab TAKE 1 TABLET BY MOUTH EVERY 6 HOURS AS NEEDED FOR PAIN (MODERATE PAIN) 20 Tablet 1   • albuterol 108 (90 Base) MCG/ACT Aero Soln inhalation aerosol Inhale 2 Puffs every four hours as " "needed for Shortness of Breath. 1 Each 11   • traZODone (DESYREL) 50 MG Tab Take 1 tablet by mouth at bedtime. 30 tablet 6   • Syringe/Needle, Disp, (SYRINGE 3CC/61KX2-9/2\") 22G X 1-1/2\" 3 ML Misc Use to self-inject ketorolac. 10 Each 2   • triamcinolone acetonide (KENALOG) 0.1 % Ointment Apply 1 Application topically 2 times a day. 15 g 0   • Ketorolac Tromethamine (SPRIX) 15.75 MG/SPRAY Solution Use 1-2 sprays at onset of migraine.  Do not exceed more than 2 sprays in 24 hours. 5 Each 0   • acetaminophen (TYLENOL) 500 MG Tab Take 1,000 mg by mouth every 6 hours as needed.     • ibuprofen (MOTRIN) 800 MG Tab Take 800 mg by mouth every 8 hours as needed.       No current facility-administered medications for this visit.       Patient Active Problem List    Diagnosis Date Noted   • Intractable hemiplegic migraine without status migrainosus 01/13/2021   • Urinary incontinence 05/07/2020   • Chronic intractable headache 05/07/2020   • Anaphylactic shock due to shellfish 05/07/2020   • HLA B27 (HLA B27 positive) 07/09/2019   • Multiple nevi 07/01/2019   • History of herpes genitalis 12/24/2018   • Cocaine abuse in remission (HCC) 12/24/2018   • Mild intermittent asthma 12/24/2018   • Chronic tension headache 12/21/2018   • Anxiety 12/21/2018   • Endometriosis 12/21/2018   • Sprain of deltoid ligament of left ankle 12/21/2018   • Orthopnea 12/21/2018   • Snoring 12/21/2018   • Pelvic pain in female 07/09/2018   • Bipolar depression (HCC) 01/22/2018       Family History   Problem Relation Age of Onset   • Psychiatric Illness Mother    • Alcohol/Drug Mother    • Diabetes Father         type 1   • Alcohol/Drug Father    • Cancer Neg Hx        She  has a past medical history of Asthma, Depression, Endometriosis, Heart burn, Herpes genitalia, Migraine aura without headache, and Seizure disorder ([facility]).    She has no past medical history of Seizure ([facility]).  She  has a past surgical history that includes gyn surgery (2014); " vaginal hysterectomy scope total (Bilateral, 4/21/2017); cystoscopy (4/21/2017); pelviscopy (N/A, 7/9/2018); exam under anesthesia (N/A, 7/9/2018); laparoscopic lysis of adhesions (Left, 7/9/2018); and dental extraction(s).       Objective:   Vitals obtained by patient:  LMP 04/04/2017     Physical Exam:  Constitutional: Alert, no distress, well-groomed.  Skin: No rashes in visible areas.  Eye: Round. Conjunctiva clear, lids normal. No icterus.   ENMT: Lips pink without lesions, good dentition, moist mucous membranes. Phonation normal.  Neck: No masses, no thyromegaly. Moves freely without pain.  CV: Pulse as reported by patient  Respiratory: Unlabored respiratory effort, no cough or audible wheeze  Psych: Alert and oriented x3, normal affect and mood.       Assessment and Plan:   The following treatment plan was discussed:     Hemiplegic migraine-severe  2.  Sleep disorder  3.  Anxiety    Follow-up: 1.  Continue on Emgality due to failure of multiple other migraine medications  2.  Update [facility] referral  3.  Revisit 1 month  4.  Trial of temazepam 15 mg nightly to assist with sleep  Face to Face Video Visit:   I spent 25 minutes with patient/guardian and I conducted this visit with audio and video present.  Samy Martinez M.D.

## 2022-03-04 ENCOUNTER — PATIENT MESSAGE (OUTPATIENT)
Dept: MEDICAL GROUP | Facility: MEDICAL CENTER | Age: 32
End: 2022-03-04
Payer: MEDICAID

## 2022-03-04 DIAGNOSIS — L30.9 DERMATITIS: ICD-10-CM

## 2022-03-05 NOTE — OR SURGEON
Immediate Post OP Note    PreOp Diagnosis:   Pelvic pain and dyspareunia following previous laparoscopic assisted vaginal hysterectomy, performed last year, and postoperative intraperitoneal pelvic adhesions following previous surgery is suspected.    PostOp Diagnosis:   Pelvic pain and dyspareunia following previous laparoscopic assisted vaginal hysterectomy.   Intraperitoneal pelvic adhesions.     Procedure(s):  PELVISCOPY/ DIAGNOSTIC - Wound Class: Clean Contaminated  EXAM UNDER ANESTHESIA - Wound Class: Clean Contaminated  LAPAROSCOPIC LYSIS OF ADHESIONS - Wound Class: Clean Contaminated    Surgeon(s):  Francisco Cordon M.D.    Anesthesiologist/Type of Anesthesia:  Anesthesiologist: Riky Bundy M.D./General    Surgical Staff:  Circulator: Aj Perea R.N.  Relief Circulator: Cassie Baeza R.N.  Relief Scrub: Candi Gross  Scrub Person: Asia Murphy    Specimens removed if any:  None     Estimated Blood Loss:   Less than 20 cc's     Findings:   The uterus is absent.   Both Fallopian tubes are absent.   Both ovaries are normal.   There are intraperitoneal adhesions of the omentum to different areas of the left side of the pelvis.       Complications:   None.         7/9/2018 12:33 PM Francisco Cordon M.D.       no throat pain

## 2022-03-07 RX ORDER — TRIAMCINOLONE ACETONIDE 0.25 MG/G
1 CREAM TOPICAL 2 TIMES DAILY
Qty: 80 G | Refills: 0 | Status: SHIPPED | OUTPATIENT
Start: 2022-03-07 | End: 2023-10-16

## 2022-04-06 ENCOUNTER — APPOINTMENT (OUTPATIENT)
Dept: RADIOLOGY | Facility: MEDICAL CENTER | Age: 32
DRG: 700 | End: 2022-04-06
Attending: EMERGENCY MEDICINE
Payer: MEDICAID

## 2022-04-06 ENCOUNTER — HOSPITAL ENCOUNTER (INPATIENT)
Facility: MEDICAL CENTER | Age: 32
LOS: 1 days | DRG: 700 | End: 2022-04-07
Attending: EMERGENCY MEDICINE | Admitting: SURGERY
Payer: MEDICAID

## 2022-04-06 ENCOUNTER — APPOINTMENT (OUTPATIENT)
Dept: RADIOLOGY | Facility: MEDICAL CENTER | Age: 32
DRG: 700 | End: 2022-04-06
Payer: MEDICAID

## 2022-04-06 DIAGNOSIS — T14.90XA TRAUMA: ICD-10-CM

## 2022-04-06 PROBLEM — S37.002A: Status: ACTIVE | Noted: 2022-04-06

## 2022-04-06 PROBLEM — S37.001A: Status: ACTIVE | Noted: 2022-04-06

## 2022-04-06 PROBLEM — H53.8 BLURRED VISION, RIGHT EYE: Status: ACTIVE | Noted: 2022-04-06

## 2022-04-06 PROBLEM — Z53.09 CONTRAINDICATION TO DEEP VEIN THROMBOSIS (DVT) PROPHYLAXIS: Status: ACTIVE | Noted: 2022-04-06

## 2022-04-06 PROBLEM — Z11.52 ENCOUNTER FOR SCREENING FOR COVID-19: Status: ACTIVE | Noted: 2022-04-06

## 2022-04-06 LAB
ABO GROUP BLD: NORMAL
ALBUMIN SERPL BCP-MCNC: 4.9 G/DL (ref 3.2–4.9)
ALBUMIN/GLOB SERPL: 1.8 G/DL
ALP SERPL-CCNC: 104 U/L (ref 30–99)
ALT SERPL-CCNC: 19 U/L (ref 2–50)
ANION GAP SERPL CALC-SCNC: 14 MMOL/L (ref 7–16)
APPEARANCE UR: CLEAR
AST SERPL-CCNC: 19 U/L (ref 12–45)
BACTERIA #/AREA URNS HPF: NEGATIVE /HPF
BILIRUB SERPL-MCNC: 0.3 MG/DL (ref 0.1–1.5)
BILIRUB UR QL STRIP.AUTO: NEGATIVE
BLD GP AB SCN SERPL QL: NORMAL
BUN SERPL-MCNC: 13 MG/DL (ref 8–22)
CALCIUM SERPL-MCNC: 9.7 MG/DL (ref 8.5–10.5)
CHLORIDE SERPL-SCNC: 104 MMOL/L (ref 96–112)
CO2 SERPL-SCNC: 20 MMOL/L (ref 20–33)
COLOR UR: YELLOW
CREAT SERPL-MCNC: 0.72 MG/DL (ref 0.5–1.4)
EPI CELLS #/AREA URNS HPF: ABNORMAL /HPF
ERYTHROCYTE [DISTWIDTH] IN BLOOD BY AUTOMATED COUNT: 40.6 FL (ref 35.9–50)
ETHANOL BLD-MCNC: <10.1 MG/DL (ref 0–10)
GFR SERPLBLD CREATININE-BSD FMLA CKD-EPI: 115 ML/MIN/1.73 M 2
GLOBULIN SER CALC-MCNC: 2.7 G/DL (ref 1.9–3.5)
GLUCOSE SERPL-MCNC: 102 MG/DL (ref 65–99)
GLUCOSE UR STRIP.AUTO-MCNC: NEGATIVE MG/DL
HCG SERPL QL: NEGATIVE
HCT VFR BLD AUTO: 45.6 % (ref 37–47)
HGB BLD-MCNC: 15.3 G/DL (ref 12–16)
HYALINE CASTS #/AREA URNS LPF: ABNORMAL /LPF
KETONES UR STRIP.AUTO-MCNC: NEGATIVE MG/DL
LEUKOCYTE ESTERASE UR QL STRIP.AUTO: NEGATIVE
MCH RBC QN AUTO: 28.6 PG (ref 27–33)
MCHC RBC AUTO-ENTMCNC: 33.6 G/DL (ref 33.6–35)
MCV RBC AUTO: 85.2 FL (ref 81.4–97.8)
MICRO URNS: ABNORMAL
NITRITE UR QL STRIP.AUTO: NEGATIVE
PH UR STRIP.AUTO: 5 [PH] (ref 5–8)
PLATELET # BLD AUTO: 289 K/UL (ref 164–446)
PMV BLD AUTO: 9.9 FL (ref 9–12.9)
POTASSIUM SERPL-SCNC: 3.7 MMOL/L (ref 3.6–5.5)
PROT SERPL-MCNC: 7.6 G/DL (ref 6–8.2)
PROT UR QL STRIP: NEGATIVE MG/DL
RBC # BLD AUTO: 5.35 M/UL (ref 4.2–5.4)
RBC # URNS HPF: ABNORMAL /HPF
RBC UR QL AUTO: ABNORMAL
RH BLD: NORMAL
SODIUM SERPL-SCNC: 138 MMOL/L (ref 135–145)
SP GR UR STRIP.AUTO: >=1.045
UROBILINOGEN UR STRIP.AUTO-MCNC: 0.2 MG/DL
WBC # BLD AUTO: 15.1 K/UL (ref 4.8–10.8)
WBC #/AREA URNS HPF: ABNORMAL /HPF

## 2022-04-06 PROCEDURE — 700102 HCHG RX REV CODE 250 W/ 637 OVERRIDE(OP): Performed by: PHYSICIAN ASSISTANT

## 2022-04-06 PROCEDURE — 72170 X-RAY EXAM OF PELVIS: CPT

## 2022-04-06 PROCEDURE — A9270 NON-COVERED ITEM OR SERVICE: HCPCS | Performed by: PHYSICIAN ASSISTANT

## 2022-04-06 PROCEDURE — 36415 COLL VENOUS BLD VENIPUNCTURE: CPT

## 2022-04-06 PROCEDURE — 305948 HCHG GREEN TRAUMA ACT PRE-NOTIFY NO CC

## 2022-04-06 PROCEDURE — 96375 TX/PRO/DX INJ NEW DRUG ADDON: CPT

## 2022-04-06 PROCEDURE — 700117 HCHG RX CONTRAST REV CODE 255: Performed by: EMERGENCY MEDICINE

## 2022-04-06 PROCEDURE — 82077 ASSAY SPEC XCP UR&BREATH IA: CPT

## 2022-04-06 PROCEDURE — 99291 CRITICAL CARE FIRST HOUR: CPT | Performed by: SURGERY

## 2022-04-06 PROCEDURE — 80053 COMPREHEN METABOLIC PANEL: CPT

## 2022-04-06 PROCEDURE — 99285 EMERGENCY DEPT VISIT HI MDM: CPT

## 2022-04-06 PROCEDURE — 72128 CT CHEST SPINE W/O DYE: CPT

## 2022-04-06 PROCEDURE — 71045 X-RAY EXAM CHEST 1 VIEW: CPT

## 2022-04-06 PROCEDURE — 700111 HCHG RX REV CODE 636 W/ 250 OVERRIDE (IP): Performed by: EMERGENCY MEDICINE

## 2022-04-06 PROCEDURE — 770001 HCHG ROOM/CARE - MED/SURG/GYN PRIV*

## 2022-04-06 PROCEDURE — 700105 HCHG RX REV CODE 258: Performed by: PHYSICIAN ASSISTANT

## 2022-04-06 PROCEDURE — 86901 BLOOD TYPING SEROLOGIC RH(D): CPT

## 2022-04-06 PROCEDURE — 72131 CT LUMBAR SPINE W/O DYE: CPT

## 2022-04-06 PROCEDURE — 70450 CT HEAD/BRAIN W/O DYE: CPT

## 2022-04-06 PROCEDURE — 86900 BLOOD TYPING SEROLOGIC ABO: CPT

## 2022-04-06 PROCEDURE — 71260 CT THORAX DX C+: CPT

## 2022-04-06 PROCEDURE — 96374 THER/PROPH/DIAG INJ IV PUSH: CPT

## 2022-04-06 PROCEDURE — 86850 RBC ANTIBODY SCREEN: CPT

## 2022-04-06 PROCEDURE — 700101 HCHG RX REV CODE 250: Performed by: EMERGENCY MEDICINE

## 2022-04-06 PROCEDURE — 81001 URINALYSIS AUTO W/SCOPE: CPT

## 2022-04-06 PROCEDURE — 85027 COMPLETE CBC AUTOMATED: CPT

## 2022-04-06 PROCEDURE — 700111 HCHG RX REV CODE 636 W/ 250 OVERRIDE (IP): Performed by: PHYSICIAN ASSISTANT

## 2022-04-06 PROCEDURE — 84703 CHORIONIC GONADOTROPIN ASSAY: CPT

## 2022-04-06 PROCEDURE — 96376 TX/PRO/DX INJ SAME DRUG ADON: CPT

## 2022-04-06 RX ORDER — AMOXICILLIN 250 MG
1 CAPSULE ORAL
Status: DISCONTINUED | OUTPATIENT
Start: 2022-04-06 | End: 2022-04-07 | Stop reason: HOSPADM

## 2022-04-06 RX ORDER — AMOXICILLIN 250 MG
1 CAPSULE ORAL NIGHTLY
Status: DISCONTINUED | OUTPATIENT
Start: 2022-04-06 | End: 2022-04-07 | Stop reason: HOSPADM

## 2022-04-06 RX ORDER — OXYCODONE HYDROCHLORIDE 5 MG/1
5 TABLET ORAL EVERY 4 HOURS PRN
Status: DISCONTINUED | OUTPATIENT
Start: 2022-04-06 | End: 2022-04-06

## 2022-04-06 RX ORDER — IBUPROFEN 800 MG/1
800 TABLET ORAL EVERY 8 HOURS PRN
COMMUNITY
End: 2023-10-16

## 2022-04-06 RX ORDER — OXYCODONE HYDROCHLORIDE 10 MG/1
10 TABLET ORAL EVERY 4 HOURS PRN
Status: DISCONTINUED | OUTPATIENT
Start: 2022-04-06 | End: 2022-04-06

## 2022-04-06 RX ORDER — PROCHLORPERAZINE MALEATE 10 MG
10 TABLET ORAL EVERY 6 HOURS PRN
Status: DISCONTINUED | OUTPATIENT
Start: 2022-04-06 | End: 2022-04-07

## 2022-04-06 RX ORDER — ONDANSETRON 2 MG/ML
4 INJECTION INTRAMUSCULAR; INTRAVENOUS ONCE
Status: COMPLETED | OUTPATIENT
Start: 2022-04-06 | End: 2022-04-06

## 2022-04-06 RX ORDER — POLYETHYLENE GLYCOL 3350 17 G/17G
1 POWDER, FOR SOLUTION ORAL 2 TIMES DAILY
Status: DISCONTINUED | OUTPATIENT
Start: 2022-04-06 | End: 2022-04-07 | Stop reason: HOSPADM

## 2022-04-06 RX ORDER — ACETAMINOPHEN 500 MG
1000 TABLET ORAL EVERY 6 HOURS
Status: DISCONTINUED | OUTPATIENT
Start: 2022-04-06 | End: 2022-04-06

## 2022-04-06 RX ORDER — ACETAMINOPHEN 650 MG/1
650 SUPPOSITORY RECTAL EVERY 4 HOURS PRN
Status: DISCONTINUED | OUTPATIENT
Start: 2022-04-06 | End: 2022-04-06

## 2022-04-06 RX ORDER — BUTALBITAL, ACETAMINOPHEN AND CAFFEINE 50; 325; 40 MG/1; MG/1; MG/1
1-2 TABLET ORAL EVERY 4 HOURS PRN
COMMUNITY
End: 2022-06-07 | Stop reason: SDUPTHER

## 2022-04-06 RX ORDER — ONDANSETRON 2 MG/ML
4 INJECTION INTRAMUSCULAR; INTRAVENOUS EVERY 4 HOURS PRN
Status: DISCONTINUED | OUTPATIENT
Start: 2022-04-06 | End: 2022-04-07 | Stop reason: HOSPADM

## 2022-04-06 RX ORDER — ACETAMINOPHEN 325 MG/1
650 TABLET ORAL EVERY 6 HOURS PRN
COMMUNITY
End: 2023-10-16

## 2022-04-06 RX ORDER — ACETAMINOPHEN 325 MG/1
650 TABLET ORAL EVERY 6 HOURS
Status: DISCONTINUED | OUTPATIENT
Start: 2022-04-07 | End: 2022-04-07 | Stop reason: HOSPADM

## 2022-04-06 RX ORDER — ACYCLOVIR 400 MG/1
400 TABLET ORAL 2 TIMES DAILY PRN
COMMUNITY
End: 2023-11-29 | Stop reason: SDUPTHER

## 2022-04-06 RX ORDER — ALPRAZOLAM 1 MG/1
1 TABLET ORAL NIGHTLY PRN
COMMUNITY
End: 2022-06-07 | Stop reason: SDUPTHER

## 2022-04-06 RX ORDER — ACETAMINOPHEN 325 MG/1
650 TABLET ORAL EVERY 6 HOURS PRN
Status: DISCONTINUED | OUTPATIENT
Start: 2022-04-11 | End: 2022-04-07 | Stop reason: HOSPADM

## 2022-04-06 RX ORDER — OXYCODONE HYDROCHLORIDE AND ACETAMINOPHEN 5; 325 MG/1; MG/1
1-2 TABLET ORAL EVERY 6 HOURS PRN
Status: DISCONTINUED | OUTPATIENT
Start: 2022-04-06 | End: 2022-04-07 | Stop reason: HOSPADM

## 2022-04-06 RX ORDER — ACETAMINOPHEN 500 MG
1000 TABLET ORAL EVERY 6 HOURS PRN
Status: DISCONTINUED | OUTPATIENT
Start: 2022-04-11 | End: 2022-04-06

## 2022-04-06 RX ORDER — HYDROMORPHONE HYDROCHLORIDE 1 MG/ML
0.5 INJECTION, SOLUTION INTRAMUSCULAR; INTRAVENOUS; SUBCUTANEOUS ONCE
Status: COMPLETED | OUTPATIENT
Start: 2022-04-06 | End: 2022-04-06

## 2022-04-06 RX ORDER — SODIUM CHLORIDE, SODIUM LACTATE, POTASSIUM CHLORIDE, CALCIUM CHLORIDE 600; 310; 30; 20 MG/100ML; MG/100ML; MG/100ML; MG/100ML
INJECTION, SOLUTION INTRAVENOUS CONTINUOUS
Status: DISCONTINUED | OUTPATIENT
Start: 2022-04-06 | End: 2022-04-07 | Stop reason: HOSPADM

## 2022-04-06 RX ORDER — DOCUSATE SODIUM 100 MG/1
100 CAPSULE, LIQUID FILLED ORAL 2 TIMES DAILY
Status: DISCONTINUED | OUTPATIENT
Start: 2022-04-06 | End: 2022-04-07 | Stop reason: HOSPADM

## 2022-04-06 RX ORDER — ACETAMINOPHEN 325 MG/1
650 TABLET ORAL EVERY 4 HOURS PRN
Status: DISCONTINUED | OUTPATIENT
Start: 2022-04-06 | End: 2022-04-06

## 2022-04-06 RX ORDER — ONDANSETRON 4 MG/1
4 TABLET, ORALLY DISINTEGRATING ORAL EVERY 4 HOURS PRN
Status: DISCONTINUED | OUTPATIENT
Start: 2022-04-06 | End: 2022-04-07 | Stop reason: HOSPADM

## 2022-04-06 RX ORDER — ENEMA 19; 7 G/133ML; G/133ML
1 ENEMA RECTAL
Status: DISCONTINUED | OUTPATIENT
Start: 2022-04-06 | End: 2022-04-07 | Stop reason: HOSPADM

## 2022-04-06 RX ORDER — BISACODYL 10 MG
10 SUPPOSITORY, RECTAL RECTAL
Status: DISCONTINUED | OUTPATIENT
Start: 2022-04-06 | End: 2022-04-07 | Stop reason: HOSPADM

## 2022-04-06 RX ORDER — PROPARACAINE HYDROCHLORIDE 5 MG/ML
1 SOLUTION/ DROPS OPHTHALMIC ONCE
Status: COMPLETED | OUTPATIENT
Start: 2022-04-06 | End: 2022-04-06

## 2022-04-06 RX ADMIN — HYDROMORPHONE HYDROCHLORIDE 0.5 MG: 1 INJECTION, SOLUTION INTRAMUSCULAR; INTRAVENOUS; SUBCUTANEOUS at 15:08

## 2022-04-06 RX ADMIN — OXYCODONE HYDROCHLORIDE 5 MG: 5 TABLET ORAL at 16:55

## 2022-04-06 RX ADMIN — POLYETHYLENE GLYCOL 3350 1 PACKET: 17 POWDER, FOR SOLUTION ORAL at 17:56

## 2022-04-06 RX ADMIN — SODIUM CHLORIDE, POTASSIUM CHLORIDE, SODIUM LACTATE AND CALCIUM CHLORIDE: 600; 310; 30; 20 INJECTION, SOLUTION INTRAVENOUS at 20:30

## 2022-04-06 RX ADMIN — DOCUSATE SODIUM 100 MG: 100 CAPSULE, LIQUID FILLED ORAL at 17:56

## 2022-04-06 RX ADMIN — OXYCODONE HYDROCHLORIDE AND ACETAMINOPHEN 2 TABLET: 5; 325 TABLET ORAL at 20:17

## 2022-04-06 RX ADMIN — ONDANSETRON 4 MG: 2 INJECTION INTRAMUSCULAR; INTRAVENOUS at 14:51

## 2022-04-06 RX ADMIN — ACETAMINOPHEN 1000 MG: 500 TABLET ORAL at 17:55

## 2022-04-06 RX ADMIN — ACETAMINOPHEN 650 MG: 325 TABLET, FILM COATED ORAL at 23:39

## 2022-04-06 RX ADMIN — ONDANSETRON 4 MG: 2 INJECTION INTRAMUSCULAR; INTRAVENOUS at 18:00

## 2022-04-06 RX ADMIN — SODIUM CHLORIDE, POTASSIUM CHLORIDE, SODIUM LACTATE AND CALCIUM CHLORIDE: 600; 310; 30; 20 INJECTION, SOLUTION INTRAVENOUS at 18:05

## 2022-04-06 RX ADMIN — IOHEXOL 100 ML: 350 INJECTION, SOLUTION INTRAVENOUS at 14:17

## 2022-04-06 RX ADMIN — PROPARACAINE HYDROCHLORIDE 1 DROP: 5 SOLUTION/ DROPS OPHTHALMIC at 16:00

## 2022-04-06 RX ADMIN — PROCHLORPERAZINE MALEATE 10 MG: 10 TABLET ORAL at 20:17

## 2022-04-06 ASSESSMENT — COGNITIVE AND FUNCTIONAL STATUS - GENERAL
MOBILITY SCORE: 20
WALKING IN HOSPITAL ROOM: A LITTLE
SUGGESTED CMS G CODE MODIFIER DAILY ACTIVITY: CH
MOVING FROM LYING ON BACK TO SITTING ON SIDE OF FLAT BED: A LITTLE
DAILY ACTIVITIY SCORE: 24
CLIMB 3 TO 5 STEPS WITH RAILING: A LITTLE
SUGGESTED CMS G CODE MODIFIER MOBILITY: CJ
STANDING UP FROM CHAIR USING ARMS: A LITTLE

## 2022-04-06 ASSESSMENT — LIFESTYLE VARIABLES
EVER FELT BAD OR GUILTY ABOUT YOUR DRINKING: NO
EVER HAD A DRINK FIRST THING IN THE MORNING TO STEADY YOUR NERVES TO GET RID OF A HANGOVER: NO
DO YOU DRINK ALCOHOL: NO
TOTAL SCORE: 0
TOTAL SCORE: 0
ON A TYPICAL DAY WHEN YOU DRINK ALCOHOL HOW MANY DRINKS DO YOU HAVE: 0
AVERAGE NUMBER OF DAYS PER WEEK YOU HAVE A DRINK CONTAINING ALCOHOL: 0
CONSUMPTION TOTAL: NEGATIVE
HAVE PEOPLE ANNOYED YOU BY CRITICIZING YOUR DRINKING: NO
TOTAL SCORE: 0
HAVE YOU EVER FELT YOU SHOULD CUT DOWN ON YOUR DRINKING: NO
HOW MANY TIMES IN THE PAST YEAR HAVE YOU HAD 5 OR MORE DRINKS IN A DAY: 0

## 2022-04-06 ASSESSMENT — PATIENT HEALTH QUESTIONNAIRE - PHQ9
2. FEELING DOWN, DEPRESSED, IRRITABLE, OR HOPELESS: NOT AT ALL
1. LITTLE INTEREST OR PLEASURE IN DOING THINGS: NOT AT ALL
SUM OF ALL RESPONSES TO PHQ9 QUESTIONS 1 AND 2: 0

## 2022-04-06 ASSESSMENT — PAIN DESCRIPTION - PAIN TYPE: TYPE: ACUTE PAIN
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
## 2022-04-06 NOTE — ED NOTES
Report from Rama WOLF pt taken to [facility] to void.  Pt and  updated on POC and plan for Opthalmology and wait time for bed on [facility].
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
## 2022-04-06 NOTE — ASSESSMENT & PLAN NOTE
Prophylactic anticoagulation for thrombotic prevention initially contraindicated secondary to elevated bleeding risk.   Ambulate.

## 2022-04-06 NOTE — ASSESSMENT & PLAN NOTE
Devascularizedportion of the posterior medial left kidney which is of uncertain chronicity, although acute vascular injury cannot be excluded with some subtle fat stranding in the left renal hilar region, tiny amount of stranding adjacent to the left kidney could represent small amount of hemorrhage. The main renal artery is grossly appear intact.  4/7 Hg stable at 13.8.  Trend hemograms, analgesia.
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
## 2022-04-06 NOTE — ED NOTES
Pharmacy Medication Reconciliation      ~Medication reconciliation updated and complete per patient at bedside  ~No oral ABX within the last 30 days  ~Patient home pharmacy:[facility]

## 2022-04-07 ENCOUNTER — PATIENT MESSAGE (OUTPATIENT)
Dept: MEDICAL GROUP | Facility: MEDICAL CENTER | Age: 32
End: 2022-04-07
Payer: MEDICAID

## 2022-04-07 VITALS
DIASTOLIC BLOOD PRESSURE: 81 MMHG | HEIGHT: 64 IN | WEIGHT: 165 LBS | OXYGEN SATURATION: 96 % | RESPIRATION RATE: 16 BRPM | SYSTOLIC BLOOD PRESSURE: 125 MMHG | HEART RATE: 83 BPM | BODY MASS INDEX: 28.17 KG/M2 | TEMPERATURE: 97.3 F

## 2022-04-07 DIAGNOSIS — R11.0 NAUSEA: ICD-10-CM

## 2022-04-07 LAB
ANION GAP SERPL CALC-SCNC: 12 MMOL/L (ref 7–16)
BASOPHILS # BLD AUTO: 0.2 % (ref 0–1.8)
BASOPHILS # BLD: 0.02 K/UL (ref 0–0.12)
BUN SERPL-MCNC: 12 MG/DL (ref 8–22)
CALCIUM SERPL-MCNC: 9.3 MG/DL (ref 8.5–10.5)
CHLORIDE SERPL-SCNC: 102 MMOL/L (ref 96–112)
CO2 SERPL-SCNC: 22 MMOL/L (ref 20–33)
CREAT SERPL-MCNC: 0.68 MG/DL (ref 0.5–1.4)
EOSINOPHIL # BLD AUTO: 0.03 K/UL (ref 0–0.51)
EOSINOPHIL NFR BLD: 0.3 % (ref 0–6.9)
ERYTHROCYTE [DISTWIDTH] IN BLOOD BY AUTOMATED COUNT: 40.4 FL (ref 35.9–50)
GFR SERPLBLD CREATININE-BSD FMLA CKD-EPI: 119 ML/MIN/1.73 M 2
GLUCOSE SERPL-MCNC: 153 MG/DL (ref 65–99)
HCT VFR BLD AUTO: 40.4 % (ref 37–47)
HGB BLD-MCNC: 13.8 G/DL (ref 12–16)
IMM GRANULOCYTES # BLD AUTO: 0.04 K/UL (ref 0–0.11)
IMM GRANULOCYTES NFR BLD AUTO: 0.4 % (ref 0–0.9)
LYMPHOCYTES # BLD AUTO: 1.37 K/UL (ref 1–4.8)
LYMPHOCYTES NFR BLD: 12.4 % (ref 22–41)
MAGNESIUM SERPL-MCNC: 1.8 MG/DL (ref 1.5–2.5)
MCH RBC QN AUTO: 28.4 PG (ref 27–33)
MCHC RBC AUTO-ENTMCNC: 34.2 G/DL (ref 33.6–35)
MCV RBC AUTO: 83.1 FL (ref 81.4–97.8)
MONOCYTES # BLD AUTO: 0.39 K/UL (ref 0–0.85)
MONOCYTES NFR BLD AUTO: 3.5 % (ref 0–13.4)
NEUTROPHILS # BLD AUTO: 9.23 K/UL (ref 2–7.15)
NEUTROPHILS NFR BLD: 83.2 % (ref 44–72)
NRBC # BLD AUTO: 0 K/UL
NRBC BLD-RTO: 0 /100 WBC
PHOSPHATE SERPL-MCNC: 3.8 MG/DL (ref 2.5–4.5)
PLATELET # BLD AUTO: 227 K/UL (ref 164–446)
PMV BLD AUTO: 9.9 FL (ref 9–12.9)
POTASSIUM SERPL-SCNC: 3.5 MMOL/L (ref 3.6–5.5)
RBC # BLD AUTO: 4.86 M/UL (ref 4.2–5.4)
SODIUM SERPL-SCNC: 136 MMOL/L (ref 135–145)
WBC # BLD AUTO: 11.1 K/UL (ref 4.8–10.8)

## 2022-04-07 PROCEDURE — A9270 NON-COVERED ITEM OR SERVICE: HCPCS | Performed by: PHYSICIAN ASSISTANT

## 2022-04-07 PROCEDURE — 700102 HCHG RX REV CODE 250 W/ 637 OVERRIDE(OP): Performed by: PHYSICIAN ASSISTANT

## 2022-04-07 PROCEDURE — 80048 BASIC METABOLIC PNL TOTAL CA: CPT

## 2022-04-07 PROCEDURE — 84100 ASSAY OF PHOSPHORUS: CPT

## 2022-04-07 PROCEDURE — 99239 HOSP IP/OBS DSCHRG MGMT >30: CPT | Mod: FS

## 2022-04-07 PROCEDURE — 36415 COLL VENOUS BLD VENIPUNCTURE: CPT

## 2022-04-07 PROCEDURE — 97161 PT EVAL LOW COMPLEX 20 MIN: CPT

## 2022-04-07 PROCEDURE — 97165 OT EVAL LOW COMPLEX 30 MIN: CPT

## 2022-04-07 PROCEDURE — 700111 HCHG RX REV CODE 636 W/ 250 OVERRIDE (IP): Performed by: NURSE PRACTITIONER

## 2022-04-07 PROCEDURE — 85025 COMPLETE CBC W/AUTO DIFF WBC: CPT

## 2022-04-07 PROCEDURE — 83735 ASSAY OF MAGNESIUM: CPT

## 2022-04-07 RX ORDER — HYDROMORPHONE HYDROCHLORIDE 1 MG/ML
0.5 INJECTION, SOLUTION INTRAMUSCULAR; INTRAVENOUS; SUBCUTANEOUS ONCE
Status: COMPLETED | OUTPATIENT
Start: 2022-04-07 | End: 2022-04-07

## 2022-04-07 RX ORDER — HYDROMORPHONE HYDROCHLORIDE 1 MG/ML
0.5 INJECTION, SOLUTION INTRAMUSCULAR; INTRAVENOUS; SUBCUTANEOUS
Status: DISCONTINUED | OUTPATIENT
Start: 2022-04-07 | End: 2022-04-07 | Stop reason: HOSPADM

## 2022-04-07 RX ORDER — DIPHENHYDRAMINE HCL 25 MG
25 TABLET ORAL EVERY 6 HOURS PRN
Status: DISCONTINUED | OUTPATIENT
Start: 2022-04-07 | End: 2022-04-07 | Stop reason: HOSPADM

## 2022-04-07 RX ORDER — ONDANSETRON 4 MG/1
4 TABLET, FILM COATED ORAL EVERY 4 HOURS PRN
Qty: 20 TABLET | Refills: 0 | Status: SHIPPED | OUTPATIENT
Start: 2022-04-07 | End: 2022-08-09

## 2022-04-07 RX ORDER — PROCHLORPERAZINE EDISYLATE 5 MG/ML
10 INJECTION INTRAMUSCULAR; INTRAVENOUS EVERY 6 HOURS PRN
Status: DISCONTINUED | OUTPATIENT
Start: 2022-04-07 | End: 2022-04-07 | Stop reason: HOSPADM

## 2022-04-07 RX ORDER — OXYCODONE HYDROCHLORIDE AND ACETAMINOPHEN 5; 325 MG/1; MG/1
1-2 TABLET ORAL EVERY 6 HOURS PRN
Qty: 20 TABLET | Refills: 0 | Status: SHIPPED | OUTPATIENT
Start: 2022-04-07 | End: 2022-04-12

## 2022-04-07 RX ADMIN — DOCUSATE SODIUM 100 MG: 100 CAPSULE, LIQUID FILLED ORAL at 04:25

## 2022-04-07 RX ADMIN — ACETAMINOPHEN 650 MG: 325 TABLET, FILM COATED ORAL at 04:25

## 2022-04-07 RX ADMIN — PROCHLORPERAZINE EDISYLATE 10 MG: 5 INJECTION INTRAMUSCULAR; INTRAVENOUS at 01:05

## 2022-04-07 RX ADMIN — HYDROMORPHONE HYDROCHLORIDE 0.5 MG: 1 INJECTION, SOLUTION INTRAMUSCULAR; INTRAVENOUS; SUBCUTANEOUS at 01:05

## 2022-04-07 ASSESSMENT — ENCOUNTER SYMPTOMS
HEARTBURN: 0
CARDIOVASCULAR NEGATIVE: 1
SENSORY CHANGE: 0
HEADACHES: 0
FOCAL WEAKNESS: 0
WEAKNESS: 0
DOUBLE VISION: 0
NAUSEA: 0
DIZZINESS: 0
ROS GI COMMENTS: LAST BM PTA
BLURRED VISION: 0
EYE PAIN: 0
FEVER: 0
TINGLING: 0
CHILLS: 0
MYALGIAS: 1
NECK PAIN: 0
SPEECH CHANGE: 0
ABDOMINAL PAIN: 0
PSYCHIATRIC NEGATIVE: 1
BACK PAIN: 1
CONSTIPATION: 0
SHORTNESS OF BREATH: 0
SORE THROAT: 0
COUGH: 0
VOMITING: 0

## 2022-04-07 ASSESSMENT — COGNITIVE AND FUNCTIONAL STATUS - GENERAL
SUGGESTED CMS G CODE MODIFIER MOBILITY: CH
SUGGESTED CMS G CODE MODIFIER DAILY ACTIVITY: CH
MOBILITY SCORE: 24
DAILY ACTIVITIY SCORE: 24

## 2022-04-07 ASSESSMENT — PAIN DESCRIPTION - PAIN TYPE
TYPE: ACUTE PAIN
TYPE: ACUTE PAIN

## 2022-04-07 ASSESSMENT — GAIT ASSESSMENTS
GAIT LEVEL OF ASSIST: SUPERVISED
DISTANCE (FEET): 25
DISTANCE (FEET): 150

## 2022-04-07 ASSESSMENT — ACTIVITIES OF DAILY LIVING (ADL): TOILETING: INDEPENDENT
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
## 2022-04-07 NOTE — PROGRESS NOTES
4 Eyes Skin Assessment Completed by Eladia BOWMAN RN and LUCIANO Eisenberg.    Head WDL  Ears WDL  Nose WDL  Mouth WDL  Neck WDL  Breast/Chest WDL  Shoulder Blades WDL  Spine WDL  (R) Arm/Elbow/Hand WDL  (L) Arm/Elbow/Hand WDL, PIV to forearm  Abdomen WDL  Groin WDL  Scrotum/Coccyx/Buttocks WDL  (R) Leg WDL  (L) Leg WDL  (R) Heel/Foot/Toe WDL  (L) Heel/Foot/Toe WDL          Devices In Places Pulse Ox      Interventions In Place Pillows and Pressure Redistribution Mattress    Possible Skin Injury No    Pictures Uploaded Into Epic N/A  Wound Consult Placed N/A  RN Wound Prevention Protocol Ordered No
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
## 2022-04-07 NOTE — DISCHARGE INSTRUCTIONS
Discharge Instructions    Discharged to home by car with relative. Discharged via wheelchair, hospital escort: Yes.  Special equipment needed: Not Applicable    Be sure to schedule a follow-up appointment with your primary care doctor or any specialists as instructed.     Discharge Plan:        I understand that a diet low in cholesterol, fat, and sodium is recommended for good health. Unless I have been given specific instructions below for another diet, I accept this instruction as my diet prescription.       Special Instructions: None    · Is patient discharged on Warfarin / Coumadin?   No             - Call or seek medical attention for questions or concerns.  - Follow up with Dr. Mxi as needed.  - Follow up with Dr. Dutton TODAY upon discharge.  - Follow up with primary care provider within one weeks time.  - Resume regular diet  - May take over the counter acetaminophen as needed for pain  - Continue daily over the counter stool softener while on narcotics  - No operation of machinery or motorized vehicles while under the influence of narcotics  - No alcohol, marijuana or illicit drug use while under the influence of narcotics  - In the event of a narcotic overdose naloxone (Narcan) is available without a prescription from any [facility] or [facility]  - May shower  - No contact sports, strenuous activities, or heavy lifting until cleared by outpatient provider  - If respiratory decompensation, persistent or worsening pain, neurological decompensation, vision changes, or signs or symptoms of infection occur seek medical attention    Depression / Suicide Risk    As you are discharged from this [facility] facility, it is important to learn how to keep safe from harming yourself.    Recognize the warning signs:  · Abrupt changes in personality, positive or negative- including increase in energy   · Giving away possessions  · Change in eating patterns- significant weight changes-  positive or  negative  · Change in sleeping patterns- unable to sleep or sleeping all the time   · Unwillingness or inability to communicate  · Depression  · Unusual sadness, discouragement and loneliness  · Talk of wanting to die  · Neglect of personal appearance   · Rebelliousness- reckless behavior  · Withdrawal from people/activities they love  · Confusion- inability to concentrate     If you or a loved one observes any of these behaviors or has concerns about self-harm, here's what you can do:  · Talk about it- your feelings and reasons for harming yourself  · Remove any means that you might use to hurt yourself (examples: pills, rope, extension cords, firearm)  · Get professional help from the community (Mental Health, Substance Abuse, psychological counseling)  · Do not be alone:Call your Safe Contact- someone whom you trust who will be there for you.  · Call your local CRISIS HOTLINE 609-5863 or 945-071-7840  · Call your local Children's Mobile Crisis Response Team [place] (657) 373-3319 or www.[place]  · Call the toll free National Suicide Prevention Hotlines   · National Suicide Prevention Lifeline 957-351-UQTO (4780)  · National Hope Line Network 800-SUICIDE (781-2811)
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
## 2022-04-07 NOTE — CARE PLAN
The patient is Stable - Low risk of patient condition declining or worsening    Shift Goals  Clinical Goals: pain and nausea control  Patient Goals: discharge    Progress made toward(s) clinical / shift goals: Patient denies nausea and reports minimal pain. Patient discharged    Patient is not progressing towards the following goals:      Problem: Pain - Standard  Goal: Alleviation of pain or a reduction in pain to the patient’s comfort goal  Outcome: Progressing     Problem: Gastrointestinal Irritability  Goal: Nausea and vomiting will be absent or improve  Outcome: Progressing

## 2022-04-07 NOTE — PROGRESS NOTES
Patient discharged to home with  and staff escort. IV discontinued. Prescriptions sent to patient's preferred pharmacy, verbalized understanding, consent signed and in chart. Discharge instructions given regarding diet, activity limitations, follow up appointments, and when to seek medical attention.  Education provided, patient asked questions and verbalized understanding. Discharge paperwork signed and in chart. Patient is tolerating diet, stable when ambulating, and pain is well controlled. All belongings collected. No further questions or concerns at this time.    Patient educated to go to Dr. Dutton's office immediately upon discharge.
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
## 2022-04-07 NOTE — PROGRESS NOTES
Dr. Dutton with [facility] notified of patient's discharge. Per Dr. Dutton's office, patient is to come to office immediately after discharge with discharge paperwork.
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
## 2022-04-07 NOTE — ED NOTES
Called Sherice RN to follow up on the report given by the day shift nurse. Updated her on new orders and pt's current status. Will put in for transport to the floor.

## 2022-04-07 NOTE — ED NOTES
Pt ambulatory with walker to BR to void.  Pt return to room, nausea and yellow emesis X1 200 cc.  Zofran given, pt reports being anxious intolerable to many medications.  Pt provided saltine cracker with provided relief.

## 2022-04-07 NOTE — PROGRESS NOTES
"/89   Pulse 82   Temp 36.4 °C (97.6 °F) (Temporal)   Resp 16   Ht 1.626 m (5' 4\")   Wt 74.8 kg (165 lb)   SpO2 99%     Notified of onset of facial tingling and persistent nausea.    Chart reviewed. Imaging reports reviewed.     Patient seen and assessed.   Reports mild tingling of right face for the past hour / also report she has been resting on her right side. Face symmetrical, no facial paralysis.   Patient reports her blurred vision in right eye has improved to close to normal.  Patient very clear in her communication. GCS 15.   Denies neck pain at rest and with mobility.   Able to ambulate to bathroom without difficulty. Voiding ok.  Reports persistent nausea with multiple episodes of vomiting.    Plan:  - trial small dose of IV dilaudid for pain control  - PO compazine changed to IV     Please notify Trauma APRN with any further concerns.  "

## 2022-04-07 NOTE — PROGRESS NOTES
Bedside report received.  Assessment complete.  A&O x 4. Patient calls appropriately.  Patient ambulates with SB assist.    Patient has 4/10 pain. Pain managed with prescribed medications.  Denies N&V. Tolerating regular diet.  + void, + flatus, - BM.  Patient denies SOB.  SCD's off.    Review plan with of care with patient. Call light and personal belongings within reach. Hourly rounding in place. All needs met at this time.

## 2022-04-07 NOTE — CARE PLAN
Problem: Knowledge Deficit - Standard  Goal: Patient and family/care givers will demonstrate understanding of plan of care, disease process/condition, diagnostic tests and medications  Outcome: Progressing       The patient is Stable - Low risk of patient condition declining or worsening    Shift Goals  Clinical Goals: pain control, nausea control    Progress made toward(s) clinical / shift goals: POC discussed with pt. All needs met at this time. Call light within reach and pt educated regarding use.    Problem: Gastrointestinal Irritability  Goal: Nausea and vomiting will be absent or improve  Outcome: Not Progressing   Patient continuing to have nausea and episodes of vomiting unrelieved by pharmacological intervention.

## 2022-04-19 ENCOUNTER — OFFICE VISIT (OUTPATIENT)
Dept: MEDICAL GROUP | Facility: MEDICAL CENTER | Age: 32
End: 2022-04-19
Attending: FAMILY MEDICINE
Payer: MEDICAID

## 2022-04-19 VITALS
WEIGHT: 173.7 LBS | RESPIRATION RATE: 16 BRPM | SYSTOLIC BLOOD PRESSURE: 112 MMHG | HEART RATE: 76 BPM | HEIGHT: 64 IN | TEMPERATURE: 97.5 F | BODY MASS INDEX: 29.65 KG/M2 | DIASTOLIC BLOOD PRESSURE: 76 MMHG | OXYGEN SATURATION: 97 %

## 2022-04-19 DIAGNOSIS — S37.002D INJURY OF LEFT KIDNEY, SUBSEQUENT ENCOUNTER: ICD-10-CM

## 2022-04-19 DIAGNOSIS — M89.8X8 ILIAC BONE PAIN: ICD-10-CM

## 2022-04-19 DIAGNOSIS — R11.0 NAUSEA: ICD-10-CM

## 2022-04-19 PROCEDURE — 99213 OFFICE O/P EST LOW 20 MIN: CPT | Performed by: FAMILY MEDICINE

## 2022-04-19 RX ORDER — ONDANSETRON 4 MG/1
4 TABLET, ORALLY DISINTEGRATING ORAL EVERY 6 HOURS PRN
Qty: 10 TABLET | Refills: 0 | Status: SHIPPED | OUTPATIENT
Start: 2022-04-19 | End: 2022-06-07 | Stop reason: SDUPTHER

## 2022-04-19 ASSESSMENT — FIBROSIS 4 INDEX: FIB4 SCORE: 0.58
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
## 2022-06-06 ENCOUNTER — TELEMEDICINE (OUTPATIENT)
Dept: MEDICAL GROUP | Facility: MEDICAL CENTER | Age: 32
End: 2022-06-06
Attending: FAMILY MEDICINE
Payer: MEDICAID

## 2022-06-06 DIAGNOSIS — R10.9 ACUTE LEFT FLANK PAIN: ICD-10-CM

## 2022-06-06 DIAGNOSIS — S06.0X0D CONCUSSION WITHOUT LOSS OF CONSCIOUSNESS, SUBSEQUENT ENCOUNTER: ICD-10-CM

## 2022-06-06 PROCEDURE — 99213 OFFICE O/P EST LOW 20 MIN: CPT | Performed by: FAMILY MEDICINE
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
## 2022-06-07 DIAGNOSIS — F41.9 ANXIETY: ICD-10-CM

## 2022-06-07 DIAGNOSIS — G89.29 CHRONIC INTRACTABLE HEADACHE, UNSPECIFIED HEADACHE TYPE: ICD-10-CM

## 2022-06-07 DIAGNOSIS — R11.0 NAUSEA: ICD-10-CM

## 2022-06-07 DIAGNOSIS — R51.9 CHRONIC INTRACTABLE HEADACHE, UNSPECIFIED HEADACHE TYPE: ICD-10-CM

## 2022-06-07 RX ORDER — ALPRAZOLAM 1 MG/1
1 TABLET ORAL NIGHTLY PRN
Qty: 30 TABLET | Refills: 1 | Status: SHIPPED | OUTPATIENT
Start: 2022-06-07 | End: 2022-07-07

## 2022-06-07 RX ORDER — BUTALBITAL, ACETAMINOPHEN AND CAFFEINE 50; 325; 40 MG/1; MG/1; MG/1
1-2 TABLET ORAL EVERY 4 HOURS PRN
Qty: 30 TABLET | Refills: 1 | Status: SHIPPED | OUTPATIENT
Start: 2022-06-07 | End: 2022-07-07

## 2022-06-07 RX ORDER — ONDANSETRON 4 MG/1
4 TABLET, ORALLY DISINTEGRATING ORAL EVERY 6 HOURS PRN
Qty: 10 TABLET | Refills: 0 | Status: SHIPPED | OUTPATIENT
Start: 2022-06-07

## 2022-06-21 ENCOUNTER — PATIENT MESSAGE (OUTPATIENT)
Dept: MEDICAL GROUP | Facility: MEDICAL CENTER | Age: 32
End: 2022-06-21
Payer: MEDICAID

## 2022-06-21 DIAGNOSIS — G43.419 INTRACTABLE HEMIPLEGIC MIGRAINE WITHOUT STATUS MIGRAINOSUS: ICD-10-CM

## 2022-08-09 DIAGNOSIS — R11.0 NAUSEA: ICD-10-CM

## 2022-08-09 DIAGNOSIS — F41.9 ANXIETY: ICD-10-CM

## 2022-08-09 RX ORDER — ONDANSETRON 4 MG/1
4 TABLET, FILM COATED ORAL EVERY 4 HOURS PRN
Qty: 20 TABLET | Refills: 0 | Status: SHIPPED | OUTPATIENT
Start: 2022-08-09 | End: 2022-08-14

## 2022-08-09 RX ORDER — ALPRAZOLAM 1 MG/1
1 TABLET ORAL NIGHTLY PRN
Qty: 30 TABLET | Refills: 0 | Status: SHIPPED | OUTPATIENT
Start: 2022-08-09 | End: 2022-09-10 | Stop reason: SDUPTHER

## 2022-09-10 DIAGNOSIS — R51.9 CHRONIC INTRACTABLE HEADACHE, UNSPECIFIED HEADACHE TYPE: ICD-10-CM

## 2022-09-10 DIAGNOSIS — G89.29 CHRONIC INTRACTABLE HEADACHE, UNSPECIFIED HEADACHE TYPE: ICD-10-CM

## 2022-09-10 DIAGNOSIS — J45.20 MILD INTERMITTENT ASTHMA WITHOUT COMPLICATION: ICD-10-CM

## 2022-09-10 DIAGNOSIS — F41.9 ANXIETY: ICD-10-CM

## 2022-09-10 DIAGNOSIS — T78.02XS ANAPHYLACTIC SHOCK DUE TO SHELLFISH, SEQUELA: ICD-10-CM

## 2022-09-12 RX ORDER — EPINEPHRINE 0.3 MG/.3ML
INJECTION SUBCUTANEOUS
Qty: 2 EACH | Refills: 0 | Status: SHIPPED | OUTPATIENT
Start: 2022-09-12 | End: 2022-09-13 | Stop reason: SDUPTHER

## 2022-09-12 RX ORDER — ALBUTEROL SULFATE 90 UG/1
2 AEROSOL, METERED RESPIRATORY (INHALATION) EVERY 4 HOURS PRN
Qty: 1 EACH | Refills: 0 | Status: SHIPPED | OUTPATIENT
Start: 2022-09-12 | End: 2022-09-13 | Stop reason: SDUPTHER

## 2022-09-12 RX ORDER — ALPRAZOLAM 1 MG/1
1 TABLET ORAL NIGHTLY PRN
Qty: 30 TABLET | Refills: 0 | Status: SHIPPED | OUTPATIENT
Start: 2022-09-12 | End: 2022-09-13 | Stop reason: SDUPTHER

## 2022-09-13 ENCOUNTER — PATIENT MESSAGE (OUTPATIENT)
Dept: MEDICAL GROUP | Facility: MEDICAL CENTER | Age: 32
End: 2022-09-13
Payer: MEDICAID

## 2022-09-13 DIAGNOSIS — J45.20 MILD INTERMITTENT ASTHMA WITHOUT COMPLICATION: ICD-10-CM

## 2022-09-13 DIAGNOSIS — F41.9 ANXIETY: ICD-10-CM

## 2022-09-13 DIAGNOSIS — T78.02XS ANAPHYLACTIC SHOCK DUE TO SHELLFISH, SEQUELA: ICD-10-CM

## 2022-09-13 RX ORDER — ALBUTEROL SULFATE 90 UG/1
2 AEROSOL, METERED RESPIRATORY (INHALATION) EVERY 4 HOURS PRN
Qty: 1 EACH | Refills: 0 | Status: SHIPPED | OUTPATIENT
Start: 2022-09-13

## 2022-09-13 RX ORDER — EPINEPHRINE 0.3 MG/.3ML
INJECTION SUBCUTANEOUS
Qty: 2 EACH | Refills: 0 | Status: SHIPPED | OUTPATIENT
Start: 2022-09-13

## 2022-09-13 RX ORDER — ALPRAZOLAM 1 MG/1
1 TABLET ORAL NIGHTLY PRN
Qty: 30 TABLET | Refills: 0 | Status: SHIPPED | OUTPATIENT
Start: 2022-09-13 | End: 2022-10-13

## 2022-09-16 RX ORDER — KETOROLAC TROMETHAMINE 30 MG/ML
10 INJECTION, SOLUTION INTRAMUSCULAR; INTRAVENOUS ONCE
Qty: 0.333 ML | Refills: 0 | Status: SHIPPED | OUTPATIENT
Start: 2022-09-16 | End: 2022-09-16

## 2022-09-16 RX ORDER — KETOROLAC TROMETHAMINE 10 MG/1
10 TABLET, FILM COATED ORAL EVERY 6 HOURS PRN
Qty: 30 TABLET | Refills: 2 | Status: SHIPPED | OUTPATIENT
Start: 2022-09-16 | End: 2022-10-16

## 2023-10-13 SDOH — ECONOMIC STABILITY: INCOME INSECURITY: IN THE LAST 12 MONTHS, WAS THERE A TIME WHEN YOU WERE NOT ABLE TO PAY THE MORTGAGE OR RENT ON TIME?: NO

## 2023-10-13 SDOH — ECONOMIC STABILITY: FOOD INSECURITY: WITHIN THE PAST 12 MONTHS, YOU WORRIED THAT YOUR FOOD WOULD RUN OUT BEFORE YOU GOT MONEY TO BUY MORE.: NEVER TRUE

## 2023-10-13 SDOH — ECONOMIC STABILITY: HOUSING INSECURITY: IN THE LAST 12 MONTHS, HOW MANY PLACES HAVE YOU LIVED?: 1

## 2023-10-13 SDOH — ECONOMIC STABILITY: INCOME INSECURITY: HOW HARD IS IT FOR YOU TO PAY FOR THE VERY BASICS LIKE FOOD, HOUSING, MEDICAL CARE, AND HEATING?: NOT VERY HARD

## 2023-10-13 SDOH — ECONOMIC STABILITY: HOUSING INSECURITY
IN THE LAST 12 MONTHS, WAS THERE A TIME WHEN YOU DID NOT HAVE A STEADY PLACE TO SLEEP OR SLEPT IN A SHELTER (INCLUDING NOW)?: NO

## 2023-10-13 SDOH — HEALTH STABILITY: MENTAL HEALTH
STRESS IS WHEN SOMEONE FEELS TENSE, NERVOUS, ANXIOUS, OR CAN'T SLEEP AT NIGHT BECAUSE THEIR MIND IS TROUBLED. HOW STRESSED ARE YOU?: RATHER MUCH

## 2023-10-13 SDOH — ECONOMIC STABILITY: TRANSPORTATION INSECURITY
IN THE PAST 12 MONTHS, HAS LACK OF RELIABLE TRANSPORTATION KEPT YOU FROM MEDICAL APPOINTMENTS, MEETINGS, WORK OR FROM GETTING THINGS NEEDED FOR DAILY LIVING?: NO

## 2023-10-13 SDOH — HEALTH STABILITY: PHYSICAL HEALTH: ON AVERAGE, HOW MANY DAYS PER WEEK DO YOU ENGAGE IN MODERATE TO STRENUOUS EXERCISE (LIKE A BRISK WALK)?: 2 DAYS

## 2023-10-13 SDOH — HEALTH STABILITY: PHYSICAL HEALTH: ON AVERAGE, HOW MANY MINUTES DO YOU ENGAGE IN EXERCISE AT THIS LEVEL?: 30 MIN

## 2023-10-13 SDOH — ECONOMIC STABILITY: FOOD INSECURITY: WITHIN THE PAST 12 MONTHS, THE FOOD YOU BOUGHT JUST DIDN'T LAST AND YOU DIDN'T HAVE MONEY TO GET MORE.: NEVER TRUE

## 2023-10-13 ASSESSMENT — LIFESTYLE VARIABLES
HOW MANY STANDARD DRINKS CONTAINING ALCOHOL DO YOU HAVE ON A TYPICAL DAY: 1 OR 2
AUDIT-C TOTAL SCORE: 1
SKIP TO QUESTIONS 9-10: 1
HOW OFTEN DO YOU HAVE SIX OR MORE DRINKS ON ONE OCCASION: NEVER
HOW OFTEN DO YOU HAVE A DRINK CONTAINING ALCOHOL: MONTHLY OR LESS

## 2023-10-13 ASSESSMENT — SOCIAL DETERMINANTS OF HEALTH (SDOH)
HOW MANY DRINKS CONTAINING ALCOHOL DO YOU HAVE ON A TYPICAL DAY WHEN YOU ARE DRINKING: 1 OR 2
HOW OFTEN DO YOU HAVE SIX OR MORE DRINKS ON ONE OCCASION: NEVER
HOW OFTEN DO YOU GET TOGETHER WITH FRIENDS OR RELATIVES?: ONCE A WEEK
IN A TYPICAL WEEK, HOW MANY TIMES DO YOU TALK ON THE PHONE WITH FAMILY, FRIENDS, OR NEIGHBORS?: MORE THAN THREE TIMES A WEEK
IN A TYPICAL WEEK, HOW MANY TIMES DO YOU TALK ON THE PHONE WITH FAMILY, FRIENDS, OR NEIGHBORS?: MORE THAN THREE TIMES A WEEK
HOW OFTEN DO YOU ATTENT MEETINGS OF THE CLUB OR ORGANIZATION YOU BELONG TO?: PATIENT DECLINED
HOW OFTEN DO YOU ATTENT MEETINGS OF THE CLUB OR ORGANIZATION YOU BELONG TO?: PATIENT DECLINED
WITHIN THE PAST 12 MONTHS, YOU WORRIED THAT YOUR FOOD WOULD RUN OUT BEFORE YOU GOT THE MONEY TO BUY MORE: NEVER TRUE
HOW OFTEN DO YOU HAVE A DRINK CONTAINING ALCOHOL: MONTHLY OR LESS
DO YOU BELONG TO ANY CLUBS OR ORGANIZATIONS SUCH AS CHURCH GROUPS UNIONS, FRATERNAL OR ATHLETIC GROUPS, OR SCHOOL GROUPS?: NO
HOW OFTEN DO YOU ATTEND CHURCH OR RELIGIOUS SERVICES?: NEVER
DO YOU BELONG TO ANY CLUBS OR ORGANIZATIONS SUCH AS CHURCH GROUPS UNIONS, FRATERNAL OR ATHLETIC GROUPS, OR SCHOOL GROUPS?: NO
HOW OFTEN DO YOU ATTEND CHURCH OR RELIGIOUS SERVICES?: NEVER
HOW HARD IS IT FOR YOU TO PAY FOR THE VERY BASICS LIKE FOOD, HOUSING, MEDICAL CARE, AND HEATING?: NOT VERY HARD
HOW OFTEN DO YOU GET TOGETHER WITH FRIENDS OR RELATIVES?: ONCE A WEEK

## 2023-10-16 ENCOUNTER — OFFICE VISIT (OUTPATIENT)
Dept: INTERNAL MEDICINE | Facility: OTHER | Age: 33
End: 2023-10-16
Payer: MEDICAID

## 2023-10-16 VITALS
BODY MASS INDEX: 28.32 KG/M2 | TEMPERATURE: 97 F | WEIGHT: 170 LBS | HEART RATE: 71 BPM | DIASTOLIC BLOOD PRESSURE: 79 MMHG | HEIGHT: 65 IN | OXYGEN SATURATION: 100 % | SYSTOLIC BLOOD PRESSURE: 118 MMHG

## 2023-10-16 DIAGNOSIS — F31.9 BIPOLAR DEPRESSION (HCC): ICD-10-CM

## 2023-10-16 DIAGNOSIS — G43.419 INTRACTABLE HEMIPLEGIC MIGRAINE WITHOUT STATUS MIGRAINOSUS: ICD-10-CM

## 2023-10-16 DIAGNOSIS — Z23 NEEDS FLU SHOT: ICD-10-CM

## 2023-10-16 DIAGNOSIS — N80.9 ENDOMETRIOSIS: ICD-10-CM

## 2023-10-16 DIAGNOSIS — R23.3 BRUISES EASILY: ICD-10-CM

## 2023-10-16 DIAGNOSIS — H91.93 BILATERAL HEARING LOSS, UNSPECIFIED HEARING LOSS TYPE: ICD-10-CM

## 2023-10-16 DIAGNOSIS — F41.9 ANXIETY: ICD-10-CM

## 2023-10-16 DIAGNOSIS — Z11.59 ENCOUNTER FOR HEPATITIS C SCREENING TEST FOR LOW RISK PATIENT: ICD-10-CM

## 2023-10-16 DIAGNOSIS — Z86.19 HISTORY OF HERPES GENITALIS: ICD-10-CM

## 2023-10-16 DIAGNOSIS — Z13.228 SCREENING FOR METABOLIC DISORDER: ICD-10-CM

## 2023-10-16 DIAGNOSIS — Z76.89 ENCOUNTER TO ESTABLISH CARE WITH NEW DOCTOR: ICD-10-CM

## 2023-10-16 DIAGNOSIS — Z13.0 SCREENING FOR DEFICIENCY ANEMIA: ICD-10-CM

## 2023-10-16 PROBLEM — S37.002A: Status: RESOLVED | Noted: 2022-04-06 | Resolved: 2023-10-16

## 2023-10-16 PROBLEM — Z11.52 ENCOUNTER FOR SCREENING FOR COVID-19: Status: RESOLVED | Noted: 2022-04-06 | Resolved: 2023-10-16

## 2023-10-16 PROBLEM — G44.229 CHRONIC TENSION HEADACHE: Status: RESOLVED | Noted: 2018-12-21 | Resolved: 2023-10-16

## 2023-10-16 PROBLEM — D22.9 MULTIPLE NEVI: Status: RESOLVED | Noted: 2019-07-01 | Resolved: 2023-10-16

## 2023-10-16 PROBLEM — T14.90XA TRAUMA: Status: RESOLVED | Noted: 2022-04-06 | Resolved: 2023-10-16

## 2023-10-16 PROBLEM — R51.9 CHRONIC INTRACTABLE HEADACHE: Status: RESOLVED | Noted: 2020-05-07 | Resolved: 2023-10-16

## 2023-10-16 PROBLEM — T78.02XA ANAPHYLACTIC SHOCK DUE TO SHELLFISH: Status: RESOLVED | Noted: 2020-05-07 | Resolved: 2023-10-16

## 2023-10-16 PROBLEM — Z53.09 CONTRAINDICATION TO DEEP VEIN THROMBOSIS (DVT) PROPHYLAXIS: Status: RESOLVED | Noted: 2022-04-06 | Resolved: 2023-10-16

## 2023-10-16 PROBLEM — R06.01 ORTHOPNEA: Status: RESOLVED | Noted: 2018-12-21 | Resolved: 2023-10-16

## 2023-10-16 PROBLEM — H53.8 BLURRED VISION, RIGHT EYE: Status: RESOLVED | Noted: 2022-04-06 | Resolved: 2023-10-16

## 2023-10-16 PROBLEM — R10.2 PELVIC PAIN IN FEMALE: Status: RESOLVED | Noted: 2018-07-09 | Resolved: 2023-10-16

## 2023-10-16 PROBLEM — R32 URINARY INCONTINENCE: Status: RESOLVED | Noted: 2020-05-07 | Resolved: 2023-10-16

## 2023-10-16 PROBLEM — G89.29 CHRONIC INTRACTABLE HEADACHE: Status: RESOLVED | Noted: 2020-05-07 | Resolved: 2023-10-16

## 2023-10-16 PROBLEM — S93.422A SPRAIN OF DELTOID LIGAMENT OF LEFT ANKLE: Status: RESOLVED | Noted: 2018-12-21 | Resolved: 2023-10-16

## 2023-10-16 PROCEDURE — 99204 OFFICE O/P NEW MOD 45 MIN: CPT | Mod: 25,GC | Performed by: INTERNAL MEDICINE

## 2023-10-16 PROCEDURE — 90471 IMMUNIZATION ADMIN: CPT | Performed by: INTERNAL MEDICINE

## 2023-10-16 PROCEDURE — 3074F SYST BP LT 130 MM HG: CPT | Performed by: INTERNAL MEDICINE

## 2023-10-16 PROCEDURE — 3078F DIAST BP <80 MM HG: CPT | Performed by: INTERNAL MEDICINE

## 2023-10-16 PROCEDURE — 90686 IIV4 VACC NO PRSV 0.5 ML IM: CPT | Performed by: INTERNAL MEDICINE

## 2023-10-16 RX ORDER — BUTALBITAL, ACETAMINOPHEN AND CAFFEINE 50; 325; 40 MG/1; MG/1; MG/1
TABLET ORAL
COMMUNITY

## 2023-10-16 RX ORDER — LAMOTRIGINE 150 MG/1
150 TABLET ORAL 2 TIMES DAILY
Qty: 60 TABLET | Refills: 1 | Status: SHIPPED | OUTPATIENT
Start: 2023-10-16

## 2023-10-16 RX ORDER — KETOROLAC TROMETHAMINE 10 MG/1
1 TABLET, FILM COATED ORAL EVERY 6 HOURS PRN
COMMUNITY

## 2023-10-16 RX ORDER — ALPRAZOLAM 1 MG/1
1 TABLET ORAL NIGHTLY PRN
COMMUNITY
End: 2023-11-14

## 2023-10-16 RX ORDER — GALCANEZUMAB 120 MG/ML
INJECTION, SOLUTION SUBCUTANEOUS
COMMUNITY
Start: 2023-10-10

## 2023-10-16 RX ORDER — VERAPAMIL HYDROCHLORIDE 120 MG/1
CAPSULE, EXTENDED RELEASE ORAL
COMMUNITY
Start: 2023-09-30

## 2023-10-16 RX ORDER — DIAZEPAM 2 MG/1
2 TABLET ORAL EVERY 6 HOURS PRN
COMMUNITY
End: 2023-11-08

## 2023-10-16 ASSESSMENT — ENCOUNTER SYMPTOMS
PALPITATIONS: 0
SPUTUM PRODUCTION: 0
BRUISES/BLEEDS EASILY: 1
SHORTNESS OF BREATH: 0
COUGH: 0
CHILLS: 0
ABDOMINAL PAIN: 0
DEPRESSION: 1
NERVOUS/ANXIOUS: 1
BLURRED VISION: 0
MYALGIAS: 0
FEVER: 0
HEADACHES: 1
NAUSEA: 0
VOMITING: 0
SEIZURES: 0
WEAKNESS: 0

## 2023-10-16 ASSESSMENT — LIFESTYLE VARIABLES: SUBSTANCE_ABUSE: 0

## 2023-10-16 ASSESSMENT — FIBROSIS 4 INDEX: FIB4 SCORE: 0.61

## 2023-10-16 NOTE — ASSESSMENT & PLAN NOTE
Historical diagnosis from childhood, and of note, patient reports being told that it is a possible misdiagnosis.  Reports history of having no symptom response to multiple medications, has had previous stability on lamotrigine, but is currently taking  lamotrigine and feels like it is not working as well.  Has not seen psychiatrist in a while due to negative experiences during childhood.  - Discussed the importance of subspecialist evaluation in the setting of this complex condition / history  - Renewed lamotrigine for now  - Referral to psychiatry

## 2023-10-16 NOTE — ASSESSMENT & PLAN NOTE
Takes alprazolam OR diazepam as needed for acute anxiety episodes.  Reports that it currently works well.  - Psychiatry referral placed

## 2023-10-16 NOTE — ASSESSMENT & PLAN NOTE
Follows with NP (Frances Brown) at [facility]  - Continue Emgality, ketorolac prn, Fioricet prn as instructed by neurology provider

## 2023-10-16 NOTE — ASSESSMENT & PLAN NOTE
Follows with NP (Frances Brown) at [facility]  - Continue Emgality, ketorolac prn, Fioricet prn as instructed by neurology provider

## 2023-10-16 NOTE — ASSESSMENT & PLAN NOTE
No acute concerns at this time  S/p hysterectomy  No further indication for cervical cancer screenings

## 2023-10-16 NOTE — ASSESSMENT & PLAN NOTE
Patient reports no occurrences while on prophylactic acyclovir 400 mg twice daily  -Continue PPx acyclovir  - Reassess need for suppressive therapy every 6 to 12 months
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
## 2023-10-17 ENCOUNTER — HOSPITAL ENCOUNTER (OUTPATIENT)
Facility: MEDICAL CENTER | Age: 33
End: 2023-10-17
Payer: MEDICAID

## 2023-10-17 DIAGNOSIS — R23.3 BRUISES EASILY: ICD-10-CM

## 2023-10-17 DIAGNOSIS — Z11.59 ENCOUNTER FOR HEPATITIS C SCREENING TEST FOR LOW RISK PATIENT: ICD-10-CM

## 2023-10-17 DIAGNOSIS — Z13.228 SCREENING FOR METABOLIC DISORDER: ICD-10-CM

## 2023-10-17 DIAGNOSIS — Z13.0 SCREENING FOR DEFICIENCY ANEMIA: ICD-10-CM

## 2023-10-17 LAB
25(OH)D3 SERPL-MCNC: 31 NG/ML (ref 30–100)
ALBUMIN SERPL BCP-MCNC: 4.8 G/DL (ref 3.2–4.9)
ALBUMIN/GLOB SERPL: 1.8 G/DL
ALP SERPL-CCNC: 84 U/L (ref 30–99)
ALT SERPL-CCNC: 10 U/L (ref 2–50)
ANION GAP SERPL CALC-SCNC: 10 MMOL/L (ref 7–16)
AST SERPL-CCNC: 11 U/L (ref 12–45)
BILIRUB SERPL-MCNC: 0.3 MG/DL (ref 0.1–1.5)
BUN SERPL-MCNC: 15 MG/DL (ref 8–22)
CALCIUM ALBUM COR SERPL-MCNC: 8.6 MG/DL (ref 8.5–10.5)
CALCIUM SERPL-MCNC: 9.2 MG/DL (ref 8.5–10.5)
CHLORIDE SERPL-SCNC: 103 MMOL/L (ref 96–112)
CO2 SERPL-SCNC: 22 MMOL/L (ref 20–33)
CREAT SERPL-MCNC: 0.64 MG/DL (ref 0.5–1.4)
ERYTHROCYTE [DISTWIDTH] IN BLOOD BY AUTOMATED COUNT: 41.4 FL (ref 35.9–50)
EST. AVERAGE GLUCOSE BLD GHB EST-MCNC: 108 MG/DL
FASTING STATUS PATIENT QL REPORTED: NORMAL
GFR SERPLBLD CREATININE-BSD FMLA CKD-EPI: 120 ML/MIN/1.73 M 2
GLOBULIN SER CALC-MCNC: 2.6 G/DL (ref 1.9–3.5)
GLUCOSE SERPL-MCNC: 96 MG/DL (ref 65–99)
HBA1C MFR BLD: 5.4 % (ref 4–5.6)
HCT VFR BLD AUTO: 46.9 % (ref 37–47)
HCV AB SER QL: NORMAL
HGB BLD-MCNC: 15.9 G/DL (ref 12–16)
MCH RBC QN AUTO: 29.7 PG (ref 27–33)
MCHC RBC AUTO-ENTMCNC: 33.9 G/DL (ref 32.2–35.5)
MCV RBC AUTO: 87.5 FL (ref 81.4–97.8)
PLATELET # BLD AUTO: 287 K/UL (ref 164–446)
PMV BLD AUTO: 10.1 FL (ref 9–12.9)
POTASSIUM SERPL-SCNC: 4.2 MMOL/L (ref 3.6–5.5)
PROT SERPL-MCNC: 7.4 G/DL (ref 6–8.2)
RBC # BLD AUTO: 5.36 M/UL (ref 4.2–5.4)
SODIUM SERPL-SCNC: 135 MMOL/L (ref 135–145)
TSH SERPL DL<=0.005 MIU/L-ACNC: 2.69 UIU/ML (ref 0.38–5.33)
WBC # BLD AUTO: 10.8 K/UL (ref 4.8–10.8)

## 2023-10-17 PROCEDURE — 85027 COMPLETE CBC AUTOMATED: CPT

## 2023-10-17 PROCEDURE — 84443 ASSAY THYROID STIM HORMONE: CPT

## 2023-10-17 PROCEDURE — 82306 VITAMIN D 25 HYDROXY: CPT

## 2023-10-17 PROCEDURE — 86803 HEPATITIS C AB TEST: CPT

## 2023-10-17 PROCEDURE — 80053 COMPREHEN METABOLIC PANEL: CPT

## 2023-10-17 PROCEDURE — 83036 HEMOGLOBIN GLYCOSYLATED A1C: CPT

## 2023-10-17 PROCEDURE — 36415 COLL VENOUS BLD VENIPUNCTURE: CPT

## 2023-11-07 ENCOUNTER — OFFICE VISIT (OUTPATIENT)
Dept: INTERNAL MEDICINE | Facility: OTHER | Age: 33
End: 2023-11-07
Payer: MEDICAID

## 2023-11-07 VITALS
HEIGHT: 65 IN | TEMPERATURE: 97.9 F | BODY MASS INDEX: 28.69 KG/M2 | SYSTOLIC BLOOD PRESSURE: 132 MMHG | WEIGHT: 172.2 LBS | DIASTOLIC BLOOD PRESSURE: 86 MMHG | HEART RATE: 80 BPM | OXYGEN SATURATION: 100 %

## 2023-11-07 DIAGNOSIS — F40.240 CLAUSTROPHOBIA: ICD-10-CM

## 2023-11-07 DIAGNOSIS — R53.83 OTHER FATIGUE: ICD-10-CM

## 2023-11-07 DIAGNOSIS — R04.0 FREQUENT EPISTAXIS: Primary | ICD-10-CM

## 2023-11-07 DIAGNOSIS — R16.1 SPLENOMEGALY: ICD-10-CM

## 2023-11-07 DIAGNOSIS — R51.9 PERSISTENT HEADACHES: ICD-10-CM

## 2023-11-07 DIAGNOSIS — H91.93 BILATERAL HEARING LOSS, UNSPECIFIED HEARING LOSS TYPE: ICD-10-CM

## 2023-11-07 PROCEDURE — 3079F DIAST BP 80-89 MM HG: CPT

## 2023-11-07 PROCEDURE — 99214 OFFICE O/P EST MOD 30 MIN: CPT | Mod: GC

## 2023-11-07 PROCEDURE — 3075F SYST BP GE 130 - 139MM HG: CPT

## 2023-11-07 RX ORDER — LORAZEPAM 0.5 MG/1
0.5 TABLET ORAL
Qty: 1 TABLET | Refills: 0 | Status: SHIPPED | OUTPATIENT
Start: 2023-11-07 | End: 2023-11-08 | Stop reason: SDUPTHER

## 2023-11-07 ASSESSMENT — FIBROSIS 4 INDEX: FIB4 SCORE: 0.39

## 2023-11-07 NOTE — PROGRESS NOTES
Established Patient    Patient Care Team:  Paris Hooker M.D. as PCP - General (Internal Medicine)  Samy Martinez M.D. (Family Medicine)    Corine Baugh is a 33 y.o. female who presents today with the following Chief Complaint(s): Follow up for The primary encounter diagnosis was Frequent epistaxis. Diagnoses of Persistent headaches, Bilateral hearing loss, unspecified hearing loss type, Splenomegaly, Other fatigue, and Claustrophobia were also pertinent to this visit.    HPI:  Patient is a pleasant 33-year-old female who is presenting for hospital follow-up after presenting for reported jaundice.  Patient states that she was on a trip to [place] on 10/27 when she had nosebleeds.  She states that during her time in [place] she had 4 episodes that were fairly easy to stop.  During that time she also experienced nausea and vomiting that started on 10/29.  On the morning of 10/30 she noticed that the area around her eyes was yellow which she thought was jaundice.  This got worse over the next day involving some slight discoloration of the eyes as well.  She provides photos today which showed bilateral periorbital ecchymosis consistent more with a raccoon eyes appearance.  She had denied any trauma to the face.  She presented to [facility] on 11/1 and was hospitalized for 1 night for lab work.  Reviewed lab work from the hospitalization which was unremarkable with blood counts and liver function.  Her platelets and coag studies were normal.  She did not have any imaging done at that time.    Is discharging from the hospital she states that she woke up around 2 AM on 11/2 with coughing and choking up blood.  She reports multiple episodes of epistaxis since discharge.    On review of systems patient has also been feeling extreme fatigue and has been bruising easily.  She is also experiencing left flank/left upper quadrant pain.  She is noted to have acute onset bilateral hearing loss  "approximately 6 months ago which was evaluated by audiology with recommendations for hearing aids.  She also notes that her skin has began to peel on her face and scalp, though denies any specific plaque formation.    Per chart review she is noted to be HLA-B27 positive, though is on clear why this lab was tested.  Reportedly she has had other autoimmune work-up completed which was negative, though do not see these in our records.      ROS:     Denies any new chest pain or shortness of breath.  No changes to urinary or bowel function.  See HPI.    Past Medical History:   Diagnosis Date    Anaphylactic shock due to shellfish 5/7/2020    Asthma     prn inhalers    Depression     anxiety bipolar depression    Endometriosis     Heart burn     Herpes genitalia     last breakout 12/2016    Migraine aura without headache     Seizure disorder (HCC)     Pt statets \"absent seizures\" last 2016     Social History     Tobacco Use    Smoking status: Never    Smokeless tobacco: Never   Vaping Use    Vaping Use: Never used   Substance Use Topics    Alcohol use: Yes     Comment: occasionally    Drug use: Not Currently     Types: Cocaine     Comment: hisory of intranasal cocaine x 3 years, last use 2009     Current Outpatient Medications   Medication Sig Dispense Refill    LORazepam (ATIVAN) 0.5 MG Tab Take 1 Tablet by mouth 1 (one) time if needed for Anxiety (Pre-Procedure Sedation) for up to 1 dose. For 1 day supply 1 Tablet 0    ALPRAZolam (XANAX) 1 MG Tab Take 1 mg by mouth at bedtime as needed for Sleep.      butalbital/apap/caffeine (FIORICET) -40 mg Tab TAKE 1 TABLET BY MOUTH TWICE A DAY AS NEEDED FOR HEADACHE OR MIGRAINE FOR 30 DAYS. R51.9      EMGALITY 120 MG/ML Solution Auto-injector       verapamil ER (CALAN SR) 120 MG CAPSULE SR 24 HR       ketorolac (TORADOL) 10 MG Tab Take 1 Tablet by mouth every 6 hours as needed.      lamotrigine (LAMICTAL) 150 MG tablet Take 1 Tablet by mouth 2 times a day. 60 Tablet 1    " "albuterol 108 (90 Base) MCG/ACT Aero Soln inhalation aerosol Inhale 2 Puffs every four hours as needed for Shortness of Breath. 1 Each 0    EPINEPHrine (EPIPEN) 0.3 MG/0.3ML Solution Auto-injector solution for injection Inject subcu as needed allergic reaction 2 Each 0    ondansetron (ZOFRAN ODT) 4 MG TABLET DISPERSIBLE Take 1 Tablet by mouth every 6 hours as needed for Nausea. 10 Tablet 0    acyclovir (ZOVIRAX) 400 MG tablet Take 400 mg by mouth 2 times a day as needed (outbreak).      Syringe/Needle, Disp, (SYRINGE 3CC/55MR6-5/2\") 22G X 1-1/2\" 3 ML Misc Use to self-inject ketorolac. 10 Each 2     No current facility-administered medications for this visit.     Physical Exam:  /86 (BP Location: Left arm, Patient Position: Sitting, BP Cuff Size: Adult)   Pulse 80   Temp 36.6 °C (97.9 °F) (Temporal)   Ht 1.638 m (5' 4.5\")   Wt 78.1 kg (172 lb 3.2 oz)   LMP 04/19/2011   SpO2 100%   BMI 29.10 kg/m²   General: Well developed, well nourished female, in no distress.  Eyes: Conjuntiva without any obvious injection or erythema.  Healing ecchymosis around bilateral eyes.  Cardiovascular: Heart is regular without murmur  Lungs: Clear to auscultation bilaterally. No wheezes, rhonci or crackles heard. Respiratory effort is normal.  Abd: Soft, tender palpable spleen.  Ext: No edema      Assessment and Plan:     Unclear etiology for patient's presentation.  Per the photographs that she showed it looks like she had developed the classic \"raccoon eyes\", however this was in the setting of no trauma.  Her recurrent epistaxis and easy bruising could be concerning for a blood disorder such as a hemophilia or von Willebrand's, however her recent lab work was fairly unremarkable.  Given she was adopted it is unknown if she has any pertinent family history that could help further guide the diagnosis. Certainly also on the differential is possible brain mass given the recent onset bilateral hearing loss also with the " persistent headaches, frequent epistaxis, and raccoon eye appearance this could represent something such as a neuroblastoma.  She is also noted to be HLA-B27 positive in the past and has vague symptoms that could be autoimmune in nature.  At this time I have ordered a comprehensive work-up including repeating her CBC and chemistry as well as looking at her liver function.  Recommend getting an MRI to look for any underlying brain mass.  She was noted to have a tender palpable spleen which would be concerning for splenomegaly.  Have ordered ultrasound of the abdomen to further evaluate this.  To complete her work-up have also added on autoimmune labs.  We will have patient follow-up in 1 week to review lab results and imaging.    Discussed with patient alarm symptoms that would warrant return to the hospital.  In the meantime recommend using an over-the-counter nasal spray: Ponaris to help reduce dryness in the nose.  Of note she does have metal as part of a chin implant.  She states she was told by her plastic surgeon that should be MRI compatible.      1. Frequent epistaxis  2. Persistent headaches  3. Bilateral hearing loss, unspecified hearing loss type  - CBC WITH DIFFERENTIAL; Future  - Comp Metabolic Panel; Future  - Prothrombin Time; Future  - MR-BRAIN IAC'S W/WO; Future    4. Splenomegaly  - US-ABDOMEN COMPLETE SURVEY; Future    5. Other fatigue  - Sed Rate; Future  - CRP QUANTITIVE (NON-CARDIAC); Future  - CAROLYN W/REFLEX IF POSITIVE  - RHEUMATOID ARTHRITIS FACTOR; Future  - CCP ANTIBODIES, IGG/IGA; Future  - TSH WITH REFLEX TO FT4; Future    6. Claustrophobia  -ativan prn pre MR imaging    Return in about 1 week (around 11/14/2023) for lab/imaging follow up.    Patient Instructions   Ponaris nasal spray- ask pharmacist. This is to help with nose dryness        Charity Vick M.D. PGY3  [employer]

## 2023-11-08 ENCOUNTER — HOSPITAL ENCOUNTER (OUTPATIENT)
Dept: LAB | Facility: MEDICAL CENTER | Age: 33
End: 2023-11-08
Payer: MEDICAID

## 2023-11-08 DIAGNOSIS — R04.0 FREQUENT EPISTAXIS: ICD-10-CM

## 2023-11-08 DIAGNOSIS — R53.83 OTHER FATIGUE: ICD-10-CM

## 2023-11-08 LAB
ALBUMIN SERPL BCP-MCNC: 4.8 G/DL (ref 3.2–4.9)
ALBUMIN/GLOB SERPL: 1.7 G/DL
ALP SERPL-CCNC: 82 U/L (ref 30–99)
ALT SERPL-CCNC: 17 U/L (ref 2–50)
ANION GAP SERPL CALC-SCNC: 12 MMOL/L (ref 7–16)
AST SERPL-CCNC: 12 U/L (ref 12–45)
BASOPHILS # BLD AUTO: 0.5 % (ref 0–1.8)
BASOPHILS # BLD: 0.06 K/UL (ref 0–0.12)
BILIRUB SERPL-MCNC: 0.3 MG/DL (ref 0.1–1.5)
BUN SERPL-MCNC: 10 MG/DL (ref 8–22)
CALCIUM ALBUM COR SERPL-MCNC: 9.3 MG/DL (ref 8.5–10.5)
CALCIUM SERPL-MCNC: 9.9 MG/DL (ref 8.5–10.5)
CHLORIDE SERPL-SCNC: 102 MMOL/L (ref 96–112)
CO2 SERPL-SCNC: 23 MMOL/L (ref 20–33)
CREAT SERPL-MCNC: 0.63 MG/DL (ref 0.5–1.4)
CRP SERPL HS-MCNC: 0.36 MG/DL (ref 0–0.75)
EOSINOPHIL # BLD AUTO: 0.12 K/UL (ref 0–0.51)
EOSINOPHIL NFR BLD: 1.1 % (ref 0–6.9)
ERYTHROCYTE [DISTWIDTH] IN BLOOD BY AUTOMATED COUNT: 44.1 FL (ref 35.9–50)
ERYTHROCYTE [SEDIMENTATION RATE] IN BLOOD BY WESTERGREN METHOD: 4 MM/HOUR (ref 0–25)
FASTING STATUS PATIENT QL REPORTED: NORMAL
GFR SERPLBLD CREATININE-BSD FMLA CKD-EPI: 121 ML/MIN/1.73 M 2
GLOBULIN SER CALC-MCNC: 2.9 G/DL (ref 1.9–3.5)
GLUCOSE SERPL-MCNC: 82 MG/DL (ref 65–99)
HCT VFR BLD AUTO: 49.1 % (ref 37–47)
HGB BLD-MCNC: 15.9 G/DL (ref 12–16)
IMM GRANULOCYTES # BLD AUTO: 0.02 K/UL (ref 0–0.11)
IMM GRANULOCYTES NFR BLD AUTO: 0.2 % (ref 0–0.9)
INR PPP: 0.99 (ref 0.87–1.13)
LYMPHOCYTES # BLD AUTO: 1.7 K/UL (ref 1–4.8)
LYMPHOCYTES NFR BLD: 15.1 % (ref 22–41)
MCH RBC QN AUTO: 28.8 PG (ref 27–33)
MCHC RBC AUTO-ENTMCNC: 32.4 G/DL (ref 32.2–35.5)
MCV RBC AUTO: 88.8 FL (ref 81.4–97.8)
MONOCYTES # BLD AUTO: 0.55 K/UL (ref 0–0.85)
MONOCYTES NFR BLD AUTO: 4.9 % (ref 0–13.4)
NEUTROPHILS # BLD AUTO: 8.83 K/UL (ref 1.82–7.42)
NEUTROPHILS NFR BLD: 78.2 % (ref 44–72)
NRBC # BLD AUTO: 0 K/UL
NRBC BLD-RTO: 0 /100 WBC (ref 0–0.2)
PLATELET # BLD AUTO: 276 K/UL (ref 164–446)
PMV BLD AUTO: 10.2 FL (ref 9–12.9)
POTASSIUM SERPL-SCNC: 4.4 MMOL/L (ref 3.6–5.5)
PROT SERPL-MCNC: 7.7 G/DL (ref 6–8.2)
PROTHROMBIN TIME: 13.2 SEC (ref 12–14.6)
RBC # BLD AUTO: 5.53 M/UL (ref 4.2–5.4)
RHEUMATOID FACT SER IA-ACNC: 12 IU/ML (ref 0–14)
SODIUM SERPL-SCNC: 137 MMOL/L (ref 135–145)
TSH SERPL DL<=0.005 MIU/L-ACNC: 3.18 UIU/ML (ref 0.38–5.33)
WBC # BLD AUTO: 11.3 K/UL (ref 4.8–10.8)

## 2023-11-08 PROCEDURE — 85652 RBC SED RATE AUTOMATED: CPT

## 2023-11-08 PROCEDURE — 85610 PROTHROMBIN TIME: CPT

## 2023-11-08 PROCEDURE — 86200 CCP ANTIBODY: CPT

## 2023-11-08 PROCEDURE — 80053 COMPREHEN METABOLIC PANEL: CPT

## 2023-11-08 PROCEDURE — 36415 COLL VENOUS BLD VENIPUNCTURE: CPT

## 2023-11-08 PROCEDURE — 84443 ASSAY THYROID STIM HORMONE: CPT

## 2023-11-08 PROCEDURE — 86140 C-REACTIVE PROTEIN: CPT

## 2023-11-08 PROCEDURE — 85025 COMPLETE CBC W/AUTO DIFF WBC: CPT

## 2023-11-08 PROCEDURE — 86431 RHEUMATOID FACTOR QUANT: CPT

## 2023-11-09 ENCOUNTER — HOSPITAL ENCOUNTER (OUTPATIENT)
Dept: RADIOLOGY | Facility: MEDICAL CENTER | Age: 33
End: 2023-11-09
Payer: MEDICAID

## 2023-11-09 DIAGNOSIS — R16.1 SPLENOMEGALY: ICD-10-CM

## 2023-11-09 PROCEDURE — 76700 US EXAM ABDOM COMPLETE: CPT

## 2023-11-10 LAB — CCP IGA+IGG SERPL IA-ACNC: 3 UNITS (ref 0–19)

## 2023-11-14 ENCOUNTER — OFFICE VISIT (OUTPATIENT)
Dept: INTERNAL MEDICINE | Facility: OTHER | Age: 33
End: 2023-11-14
Payer: MEDICAID

## 2023-11-14 VITALS
SYSTOLIC BLOOD PRESSURE: 122 MMHG | HEIGHT: 64 IN | WEIGHT: 171.4 LBS | OXYGEN SATURATION: 98 % | DIASTOLIC BLOOD PRESSURE: 83 MMHG | BODY MASS INDEX: 29.26 KG/M2 | HEART RATE: 91 BPM | TEMPERATURE: 98 F

## 2023-11-14 DIAGNOSIS — L65.9 HAIR LOSS: ICD-10-CM

## 2023-11-14 DIAGNOSIS — G47.30 SLEEP APNEA, UNSPECIFIED TYPE: ICD-10-CM

## 2023-11-14 DIAGNOSIS — R53.83 OTHER FATIGUE: ICD-10-CM

## 2023-11-14 DIAGNOSIS — R51.9 PERSISTENT HEADACHES: ICD-10-CM

## 2023-11-14 PROCEDURE — 3074F SYST BP LT 130 MM HG: CPT | Performed by: STUDENT IN AN ORGANIZED HEALTH CARE EDUCATION/TRAINING PROGRAM

## 2023-11-14 PROCEDURE — 99214 OFFICE O/P EST MOD 30 MIN: CPT | Mod: GC | Performed by: STUDENT IN AN ORGANIZED HEALTH CARE EDUCATION/TRAINING PROGRAM

## 2023-11-14 PROCEDURE — 3079F DIAST BP 80-89 MM HG: CPT | Performed by: STUDENT IN AN ORGANIZED HEALTH CARE EDUCATION/TRAINING PROGRAM

## 2023-11-14 ASSESSMENT — FIBROSIS 4 INDEX: FIB4 SCORE: 0.34

## 2023-11-14 NOTE — PATIENT INSTRUCTIONS
Thank you for coming in today, Corine  Please get your other lab work done when you can  I have sent an urgent referral for your MRI, please schedule it as soon as possible  I have also sent a referral to sleep medicine, please schedule an appointment when you can  Follow up in the next 5 weeks with Dr Vick to further address your heart history

## 2023-11-14 NOTE — ASSESSMENT & PLAN NOTE
Following with [facility], but reports Emgality is not really helping. Takes Fioricet as needed, when unable to tolerate headaches.  -Can consider sleep apnea as contributing, referral to sleep medicine sent  -Also, w/up for possible brain mass per PCP previously ordered but scheduled for December. Will place urgent MRI w/ and w/o contrast. Oral Ativan for imaging already ordered.

## 2023-11-14 NOTE — PROGRESS NOTES
"    Established Patient    Patient Care Team:  Charity Vick M.D. as PCP - General (Internal Medicine)  Samy Martinez M.D. (Family Medicine)    HPI:  Corine Baugh is a 33 y.o. female who presents today with the following Chief Complaint(s):   Chief Complaint   Patient presents with    Follow-Up    Hair Loss     1x week ago, \"pull out in handfulls\"     Patient last seen in clinic 11/7/23 - labs reviewed with patient.  CBC with WBC 11.3, mildly elevated. CMP unremarkable.   TSH 3.180 wnl  CCP and rheumatoid factor wnl, ESR/CRP normal. Coag studies normal.  Abdominal US without splenomegaly. MRI brain pending.    Today, patient reports persistent fatigue, nosebleeds still occurring almost daily with notable episode lasting around 40 min. Has also noted significant hair fall in the last week. No previous episodes, denies any recent stressors but notices a lot of hair on her pillow when waking up, when she brushes her hair/showers. Has been using Nexus shampoo, no changes in other hair products. Has also tried Selsun blue medicated shampoo and Nizoral shampoo in the past without aid. Hair is usually tied up, but not too tight due to headaches/migraines. Without notable rashes/lesions, bald patches. Not on medications that could potentially cause hair loss.     Patient also states that her  tells her she snores loudly, which has worsened in the last 2 weeks with apneic episodes. She reports waking with a headache (does have Fioricet on medication list but does not use regularly so unlikely analgesic related), does not feel well rested upon waking and constant fatigue. Does not report issues with falling or staying asleep. Naps around 1-2x daily, stating that if she is just sitting down and reading a book she will fall asleep. STOPBANG-4. Mallampati 4.    Other than that, MRI scheduled for December but may need a sooner appointment so urgent order placed. She is also interested in returning to " "work. Work note, with restrictions, provided.    Review of Systems   12 point ROS reviewed and are negative except as mentioned in the HPI.      Past Medical History:   Diagnosis Date    Anaphylactic shock due to shellfish 5/7/2020    Asthma     prn inhalers    Depression     anxiety bipolar depression    Endometriosis     Heart burn     Herpes genitalia     last breakout 12/2016    Migraine aura without headache     Seizure disorder ([facility])     Pt statets \"absent seizures\" last 2016       Patient Active Problem List    Diagnosis Date Noted    Bilateral hearing loss 10/16/2023    Intractable hemiplegic migraine without status migrainosus 01/13/2021    HLA B27 (HLA B27 positive) 07/09/2019    History of herpes genitalis 12/24/2018    Cocaine abuse in remission ([facility]) 12/24/2018    Mild intermittent asthma 12/24/2018    Anxiety 12/21/2018    Endometriosis 12/21/2018    Snoring 12/21/2018    Bipolar depression ([facility]) 01/22/2018       Allergies:Avocado, Banana, Bee, Betadine [povidone iodine], Clarithromycin, Clarithromycin, Demerol, Demerol, Eggplant, Iodine, Iodine, Lavandin oil, Macrobid [nitrofurantoin], Mushroom extract complex, Shellfish allergy, Avocado, Banana, Hydrocodone, Hydrocodone, Latex, Lavandin oil, Montelukast, Montelukast [singulair], Mushroom extract complex, Nitrofurantoin, Shellfish allergy, and Latex    Current Outpatient Medications   Medication Sig Dispense Refill    butalbital/apap/caffeine (FIORICET) -40 mg Tab TAKE 1 TABLET BY MOUTH TWICE A DAY AS NEEDED FOR HEADACHE OR MIGRAINE FOR 30 DAYS. R51.9      EMGALITY 120 MG/ML Solution Auto-injector       verapamil ER (CALAN SR) 120 MG CAPSULE SR 24 HR       ketorolac (TORADOL) 10 MG Tab Take 1 Tablet by mouth every 6 hours as needed.      lamotrigine (LAMICTAL) 150 MG tablet Take 1 Tablet by mouth 2 times a day. 60 Tablet 1    albuterol 108 (90 Base) MCG/ACT Aero Soln inhalation aerosol Inhale 2 Puffs every four hours as needed for Shortness of " "Breath. 1 Each 0    EPINEPHrine (EPIPEN) 0.3 MG/0.3ML Solution Auto-injector solution for injection Inject subcu as needed allergic reaction 2 Each 0    ondansetron (ZOFRAN ODT) 4 MG TABLET DISPERSIBLE Take 1 Tablet by mouth every 6 hours as needed for Nausea. 10 Tablet 0    acyclovir (ZOVIRAX) 400 MG tablet Take 400 mg by mouth 2 times a day as needed (outbreak).      ALPRAZolam (XANAX) 1 MG Tab Take 1 mg by mouth at bedtime as needed for Sleep. (Patient not taking: Reported on 11/14/2023)      Syringe/Needle, Disp, (SYRINGE 3CC/83VC2-2/2\") 22G X 1-1/2\" 3 ML Misc Use to self-inject ketorolac. 10 Each 2     No current facility-administered medications for this visit.       Social History     Tobacco Use    Smoking status: Never    Smokeless tobacco: Never   Vaping Use    Vaping Use: Never used   Substance Use Topics    Alcohol use: Yes     Comment: occasionally    Drug use: Not Currently     Types: Cocaine     Comment: hisory of intranasal cocaine x 3 years, last use 2009       Family History   Problem Relation Age of Onset    Psychiatric Illness Mother     Alcohol/Drug Mother     Diabetes Father         type 1    Alcohol/Drug Father     Cancer Neg Hx          Physical Exam  Vitals:  /83 (BP Location: Left arm, Patient Position: Sitting, BP Cuff Size: Adult)   Pulse 91   Temp 36.7 °C (98 °F) (Temporal)   Ht 1.626 m (5' 4\")   Wt 77.7 kg (171 lb 6.4 oz)   SpO2 98%  Body mass index is 29.42 kg/m².  Constitutional:  Not in acute distress, well appearing.  HEENT:   NC/AT, EOMI, conjunctiva normal, hearing grossly intact, , MMM, Mallampati 4  Cardiovascular: Regular rate and rhythm. I-II systolic murmur noted    Lungs:   Clear to auscultation bilaterally. No wheezes or crackles.   Abdomen: Not distended  Extremities:  No edema. No obvious deformities.  Skin:  Warm and dry.  No visible rashes. Scalp-without bald patches, no rashes or scars/lesions  Neurologic: Alert & oriented x 3, CN II-XII grossly intact, no " focal deficits noted.  Psychiatric:  Affect normal, mood normal, judgment normal.      Assessment and Plan:     Problem List Items Addressed This Visit       Persistent headaches     Following with [facility], but reports Emgality is not really helping. Takes Fioricet as needed, when unable to tolerate headaches.  -Can consider sleep apnea as contributing, referral to sleep medicine sent  -Also, w/up for possible brain mass per PCP previously ordered but scheduled for December. Will place urgent MRI w/ and w/o contrast. Oral Ativan for imaging already ordered.         Relevant Orders    MR-BRAIN-WITH & W/O    Sleep apnea     Reported snoring and witnessed apneic episodes, per . Wakes with a headache, does not feel well rested upon waking, naps 1-2x daily. No issues with falling or staying asleep  STOPBAN, Mallampati 4  Also, patient is young and already on antihypertensive medication. Patient reports heart history as well (murmur, was to have a Ziopatch done but did not do it. For patient to discuss further with PCP at future visit)  -Referral for sleep medicine         Relevant Orders    Referral to Pulmonary and Sleep Medicine    Other fatigue     Labs largely unremarkable. Can also consider sleep apnea as contributing. Please see problem #sleep apnea         Hair loss     No increased stress, no change in hair products, not on medications that could contribute, thyroid studies normal. No notable rashes/lesions, bald patches. Hair pull test positive.  -Unknown etiology at this time. Without notable absorption concerns. But will get iron studies and ferritin to further assess for possible iron deficiency as contributing           Relevant Orders    IRON/TOTAL IRON BIND    FERRITIN       Return in about 5 weeks (around 2023).    Harriet Herrera M.D. PGY-3  [facility]

## 2023-11-14 NOTE — ASSESSMENT & PLAN NOTE
Labs largely unremarkable. Can also consider sleep apnea as contributing. Please see problem #sleep apnea

## 2023-11-14 NOTE — LETTER
November 14, 2023      To whom it may concern:      Corine Baugh was seen at her scheduled appointment today, November 14, 2023. She is able to return to work with restrictions: avoid heavy lifting of greater than 20 lbs and designated breaks every 2 hours for around 10 minutes. If you have any questions or concerns, please do not hesitate to contact me.    Thank you,        Harriet Herrera MD

## 2023-11-14 NOTE — ASSESSMENT & PLAN NOTE
No increased stress, no change in hair products, not on medications that could contribute, thyroid studies normal. No notable rashes/lesions, bald patches. Hair pull test positive.  -Unknown etiology at this time. Without notable absorption concerns. But will get iron studies and ferritin to further assess for possible iron deficiency as contributing

## 2023-11-14 NOTE — ASSESSMENT & PLAN NOTE
Reported snoring and witnessed apneic episodes, per . Wakes with a headache, does not feel well rested upon waking, naps 1-2x daily. No issues with falling or staying asleep  STOPBAN, Mallampati 4  Also, patient is young and already on antihypertensive medication. Patient reports heart history as well (murmur, was to have a Ziopatch done but did not do it. For patient to discuss further with PCP at future visit)  -Referral for sleep medicine

## 2023-11-19 ENCOUNTER — HOSPITAL ENCOUNTER (OUTPATIENT)
Dept: RADIOLOGY | Facility: MEDICAL CENTER | Age: 33
End: 2023-11-19
Attending: STUDENT IN AN ORGANIZED HEALTH CARE EDUCATION/TRAINING PROGRAM
Payer: MEDICAID

## 2023-11-19 DIAGNOSIS — R51.9 PERSISTENT HEADACHES: ICD-10-CM

## 2023-11-19 PROCEDURE — A9579 GAD-BASE MR CONTRAST NOS,1ML: HCPCS | Performed by: STUDENT IN AN ORGANIZED HEALTH CARE EDUCATION/TRAINING PROGRAM

## 2023-11-19 PROCEDURE — 70553 MRI BRAIN STEM W/O & W/DYE: CPT

## 2023-11-19 PROCEDURE — 700117 HCHG RX CONTRAST REV CODE 255: Performed by: STUDENT IN AN ORGANIZED HEALTH CARE EDUCATION/TRAINING PROGRAM

## 2023-11-19 RX ADMIN — GADOTERIDOL 17 ML: 279.3 INJECTION, SOLUTION INTRAVENOUS at 13:57

## 2023-11-29 ENCOUNTER — PATIENT MESSAGE (OUTPATIENT)
Dept: INTERNAL MEDICINE | Facility: OTHER | Age: 33
End: 2023-11-29
Payer: MEDICAID

## 2023-11-29 NOTE — PATIENT COMMUNICATION
Received request via: Patient    Was the patient seen in the last year in this department? Yes    Does the patient have an active prescription (recently filled or refills available) for medication(s) requested?  Was not prescribed in office     Does the patient have retirement Plus and need 100 day supply (blood pressure, diabetes and cholesterol meds only)? Patient does not have SCP

## 2023-12-04 RX ORDER — ACYCLOVIR 400 MG/1
400 TABLET ORAL 2 TIMES DAILY PRN
Qty: 14 TABLET | Refills: 2 | Status: SHIPPED | OUTPATIENT
Start: 2023-12-04 | End: 2023-12-11

## 2023-12-12 ENCOUNTER — PATIENT MESSAGE (OUTPATIENT)
Dept: INTERNAL MEDICINE | Facility: OTHER | Age: 33
End: 2023-12-12
Payer: MEDICAID

## 2023-12-12 DIAGNOSIS — Z86.19 HISTORY OF HERPES GENITALIS: ICD-10-CM

## 2023-12-14 RX ORDER — ACYCLOVIR 400 MG/1
400 TABLET ORAL 2 TIMES DAILY
Qty: 60 TABLET | Refills: 0 | Status: SHIPPED | OUTPATIENT
Start: 2023-12-14

## 2023-12-14 RX ORDER — ACYCLOVIR 800 MG/1
800 TABLET ORAL 2 TIMES DAILY
Qty: 10 TABLET | Refills: 2 | Status: SHIPPED | OUTPATIENT
Start: 2023-12-14 | End: 2023-12-19

## 2023-12-15 NOTE — TELEPHONE ENCOUNTER
Can we call patient to clarify who her PCP is? She originally established care with Dr. Hooker but now I see that I am her PCP? It is fine if she wants to switch, however she will need to come in for a re-establish visit so we can review all of her medical history and medications. Until then, I will send in a one month supply of her medication as she requested but as I have never discussed this medication or its indication with her she will need a visit for future refills. Thanks!

## 2024-05-29 NOTE — TELEPHONE ENCOUNTER
"Having difficulty finding doctor. So she called Medicaid to find referral information and was told because it is a \"cosmetic surgery\" and they need to receive the referral directly to deem it medically necessary before referring her to maxillofacial.  Informed Corine she should get the specific information from Medicaid: what exact paperwork is needed and what their fax number is.  In the meantime, I am sending this question to the referral dept to see if they have sent referrals directly to Medicaid in the past.  Corine verbalized understanding and agreement with plan    " FAMILY HISTORY:  No pertinent family history in first degree relatives

## 2024-07-31 ENCOUNTER — APPOINTMENT (OUTPATIENT)
Dept: LAB | Facility: MEDICAL CENTER | Age: 34
End: 2024-07-31
Payer: MEDICAID

## 2024-11-05 ENCOUNTER — HOSPITAL ENCOUNTER (OUTPATIENT)
Dept: LAB | Facility: MEDICAL CENTER | Age: 34
End: 2024-11-05
Attending: STUDENT IN AN ORGANIZED HEALTH CARE EDUCATION/TRAINING PROGRAM
Payer: COMMERCIAL

## 2024-11-05 ENCOUNTER — HOSPITAL ENCOUNTER (OUTPATIENT)
Dept: LAB | Facility: MEDICAL CENTER | Age: 34
End: 2024-11-05
Attending: NURSE PRACTITIONER
Payer: COMMERCIAL

## 2024-11-05 DIAGNOSIS — L65.9 HAIR LOSS: ICD-10-CM

## 2024-11-05 LAB
BASOPHILS # BLD AUTO: 0.6 % (ref 0–1.8)
BASOPHILS # BLD: 0.05 K/UL (ref 0–0.12)
EOSINOPHIL # BLD AUTO: 0.13 K/UL (ref 0–0.51)
EOSINOPHIL NFR BLD: 1.4 % (ref 0–6.9)
ERYTHROCYTE [DISTWIDTH] IN BLOOD BY AUTOMATED COUNT: 40.2 FL (ref 35.9–50)
ERYTHROCYTE [SEDIMENTATION RATE] IN BLOOD BY WESTERGREN METHOD: 6 MM/HOUR (ref 0–25)
FERRITIN SERPL-MCNC: 91.9 NG/ML (ref 10–291)
HCT VFR BLD AUTO: 47.6 % (ref 37–47)
HGB BLD-MCNC: 15.7 G/DL (ref 12–16)
IMM GRANULOCYTES # BLD AUTO: 0.04 K/UL (ref 0–0.11)
IMM GRANULOCYTES NFR BLD AUTO: 0.4 % (ref 0–0.9)
IRON SATN MFR SERPL: 23 % (ref 15–55)
IRON SERPL-MCNC: 72 UG/DL (ref 40–170)
LYMPHOCYTES # BLD AUTO: 1.83 K/UL (ref 1–4.8)
LYMPHOCYTES NFR BLD: 20.4 % (ref 22–41)
MCH RBC QN AUTO: 28.3 PG (ref 27–33)
MCHC RBC AUTO-ENTMCNC: 33 G/DL (ref 32.2–35.5)
MCV RBC AUTO: 85.9 FL (ref 81.4–97.8)
MONOCYTES # BLD AUTO: 0.54 K/UL (ref 0–0.85)
MONOCYTES NFR BLD AUTO: 6 % (ref 0–13.4)
NEUTROPHILS # BLD AUTO: 6.38 K/UL (ref 1.82–7.42)
NEUTROPHILS NFR BLD: 71.2 % (ref 44–72)
NRBC # BLD AUTO: 0 K/UL
NRBC BLD-RTO: 0 /100 WBC (ref 0–0.2)
PLATELET # BLD AUTO: 297 K/UL (ref 164–446)
PMV BLD AUTO: 10.4 FL (ref 9–12.9)
RBC # BLD AUTO: 5.54 M/UL (ref 4.2–5.4)
TIBC SERPL-MCNC: 314 UG/DL (ref 250–450)
TSH SERPL DL<=0.005 MIU/L-ACNC: 2.37 UIU/ML (ref 0.38–5.33)
UIBC SERPL-MCNC: 242 UG/DL (ref 110–370)
WBC # BLD AUTO: 9 K/UL (ref 4.8–10.8)

## 2024-11-05 PROCEDURE — 86038 ANTINUCLEAR ANTIBODIES: CPT

## 2024-11-05 PROCEDURE — 86140 C-REACTIVE PROTEIN: CPT

## 2024-11-05 PROCEDURE — 84402 ASSAY OF FREE TESTOSTERONE: CPT

## 2024-11-05 PROCEDURE — 86812 HLA TYPING A B OR C: CPT

## 2024-11-05 PROCEDURE — 82728 ASSAY OF FERRITIN: CPT

## 2024-11-05 PROCEDURE — 80053 COMPREHEN METABOLIC PANEL: CPT

## 2024-11-05 PROCEDURE — 85025 COMPLETE CBC W/AUTO DIFF WBC: CPT

## 2024-11-05 PROCEDURE — 84403 ASSAY OF TOTAL TESTOSTERONE: CPT

## 2024-11-05 PROCEDURE — 80061 LIPID PANEL: CPT

## 2024-11-05 PROCEDURE — 84270 ASSAY OF SEX HORMONE GLOBUL: CPT

## 2024-11-05 PROCEDURE — 82670 ASSAY OF TOTAL ESTRADIOL: CPT

## 2024-11-05 PROCEDURE — 85652 RBC SED RATE AUTOMATED: CPT

## 2024-11-05 PROCEDURE — 84443 ASSAY THYROID STIM HORMONE: CPT

## 2024-11-05 PROCEDURE — 83498 ASY HYDROXYPROGESTERONE 17-D: CPT

## 2024-11-05 PROCEDURE — 83550 IRON BINDING TEST: CPT

## 2024-11-05 PROCEDURE — 36415 COLL VENOUS BLD VENIPUNCTURE: CPT

## 2024-11-05 PROCEDURE — 83540 ASSAY OF IRON: CPT

## 2024-11-05 PROCEDURE — 82378 CARCINOEMBRYONIC ANTIGEN: CPT

## 2024-11-06 LAB
ALBUMIN SERPL BCP-MCNC: 4.9 G/DL (ref 3.2–4.9)
ALBUMIN/GLOB SERPL: 1.7 G/DL
ALP SERPL-CCNC: 89 U/L (ref 30–99)
ALT SERPL-CCNC: 14 U/L (ref 2–50)
ANION GAP SERPL CALC-SCNC: 15 MMOL/L (ref 7–16)
AST SERPL-CCNC: 17 U/L (ref 12–45)
BILIRUB SERPL-MCNC: 0.3 MG/DL (ref 0.1–1.5)
BUN SERPL-MCNC: 16 MG/DL (ref 8–22)
CALCIUM ALBUM COR SERPL-MCNC: 9.5 MG/DL (ref 8.5–10.5)
CALCIUM SERPL-MCNC: 10.2 MG/DL (ref 8.5–10.5)
CEA SERPL-MCNC: <0.6 NG/ML (ref 0–3)
CHLORIDE SERPL-SCNC: 105 MMOL/L (ref 96–112)
CHOLEST SERPL-MCNC: 228 MG/DL (ref 100–199)
CO2 SERPL-SCNC: 16 MMOL/L (ref 20–33)
CREAT SERPL-MCNC: 0.82 MG/DL (ref 0.5–1.4)
CRP SERPL HS-MCNC: 0.36 MG/DL (ref 0–0.75)
ESTRADIOL SERPL-MCNC: 85 PG/ML
GFR SERPLBLD CREATININE-BSD FMLA CKD-EPI: 97 ML/MIN/1.73 M 2
GLOBULIN SER CALC-MCNC: 2.9 G/DL (ref 1.9–3.5)
GLUCOSE SERPL-MCNC: 95 MG/DL (ref 65–99)
HDLC SERPL-MCNC: 51 MG/DL
LDLC SERPL CALC-MCNC: 157 MG/DL
POTASSIUM SERPL-SCNC: 4.6 MMOL/L (ref 3.6–5.5)
PROT SERPL-MCNC: 7.8 G/DL (ref 6–8.2)
SODIUM SERPL-SCNC: 136 MMOL/L (ref 135–145)
TRIGL SERPL-MCNC: 100 MG/DL (ref 0–149)

## 2024-11-07 ENCOUNTER — APPOINTMENT (OUTPATIENT)
Dept: LAB | Facility: MEDICAL CENTER | Age: 34
End: 2024-11-07
Payer: COMMERCIAL

## 2024-11-07 LAB
HLA-B27 QL FC: POSITIVE
NUCLEAR IGG SER QL IA: NORMAL

## 2024-11-10 LAB — 17OHP SERPL-MCNC: 162.62 NG/DL

## 2024-11-11 LAB
SHBG SERPL-SCNC: 29 NMOL/L (ref 25–122)
TESTOST FREE SERPL-MCNC: 1.9 PG/ML (ref 1.3–9.2)
TESTOST SERPL-MCNC: 11 NG/DL (ref 9–55)
TESTOSTERONE.FREE+WB SERPL-MCNC: 5.6 NG/DL (ref 4.1–25.5)

## 2024-12-14 ENCOUNTER — APPOINTMENT (OUTPATIENT)
Dept: URGENT CARE | Facility: PHYSICIAN GROUP | Age: 34
End: 2024-12-14
Payer: COMMERCIAL

## 2024-12-17 ENCOUNTER — NON-PROVIDER VISIT (OUTPATIENT)
Dept: CARDIOLOGY | Facility: MEDICAL CENTER | Age: 34
End: 2024-12-17
Attending: NURSE PRACTITIONER
Payer: MEDICAID

## 2024-12-17 DIAGNOSIS — R00.2 PALPITATIONS: ICD-10-CM

## 2024-12-17 PROCEDURE — 93242 EXT ECG>48HR<7D RECORDING: CPT

## 2024-12-17 NOTE — PROGRESS NOTES
Patient enrolled in the 7 day o Holter monitoring program per Kami Washington NP.  >Office hook-up, serial # [id number].  >Currently pending EOS.

## 2024-12-30 ENCOUNTER — TELEPHONE (OUTPATIENT)
Dept: CARDIOLOGY | Facility: MEDICAL CENTER | Age: 34
End: 2024-12-30
Payer: COMMERCIAL

## 2024-12-31 NOTE — TELEPHONE ENCOUNTER
TRAVIS EOS to  for review on 12/31/24 as ADD    Preliminary findings:    Sinus Rhythm  with an avg rate of 91 bpm  Slight P wave morphology changes were noted    Supraventricular ectopics were rare with no noted couplets    Isolated ventricular ectopics were rare    8 patient events associated with:  SR

## 2025-01-28 ENCOUNTER — HOSPITAL ENCOUNTER (OUTPATIENT)
Dept: LAB | Facility: MEDICAL CENTER | Age: 35
End: 2025-01-28
Attending: NURSE PRACTITIONER
Payer: MEDICAID

## 2025-01-28 PROCEDURE — 86780 TREPONEMA PALLIDUM: CPT

## 2025-01-28 PROCEDURE — 87491 CHLMYD TRACH DNA AMP PROBE: CPT

## 2025-01-28 PROCEDURE — 86696 HERPES SIMPLEX TYPE 2 TEST: CPT

## 2025-01-28 PROCEDURE — 36415 COLL VENOUS BLD VENIPUNCTURE: CPT

## 2025-01-28 PROCEDURE — 87591 N.GONORRHOEAE DNA AMP PROB: CPT

## 2025-01-28 PROCEDURE — 86695 HERPES SIMPLEX TYPE 1 TEST: CPT

## 2025-01-28 PROCEDURE — 86694 HERPES SIMPLEX NES ANTBDY: CPT

## 2025-01-30 LAB
HSV1 GG IGG SER-ACNC: <0.01 IV
HSV2 GG IGG SER-ACNC: 2.55 IV

## 2025-03-13 ENCOUNTER — HOSPITAL ENCOUNTER (OUTPATIENT)
Dept: LAB | Facility: MEDICAL CENTER | Age: 35
End: 2025-03-13
Attending: NURSE PRACTITIONER
Payer: COMMERCIAL

## 2025-03-13 PROCEDURE — 86376 MICROSOMAL ANTIBODY EACH: CPT

## 2025-03-13 PROCEDURE — 84443 ASSAY THYROID STIM HORMONE: CPT

## 2025-03-13 PROCEDURE — 36415 COLL VENOUS BLD VENIPUNCTURE: CPT

## 2025-03-13 PROCEDURE — 84439 ASSAY OF FREE THYROXINE: CPT

## 2025-03-13 PROCEDURE — 84481 FREE ASSAY (FT-3): CPT

## 2025-03-14 LAB
T3FREE SERPL-MCNC: 3.65 PG/ML (ref 2–4.4)
T4 FREE SERPL-MCNC: 1.32 NG/DL (ref 0.93–1.7)
THYROPEROXIDASE AB SERPL-ACNC: 44 IU/ML (ref 0–9)
TSH SERPL-ACNC: 3.26 UIU/ML (ref 0.35–5.5)

## 2025-04-28 ENCOUNTER — HOSPITAL ENCOUNTER (OUTPATIENT)
Dept: LAB | Facility: MEDICAL CENTER | Age: 35
End: 2025-04-28
Attending: INTERNAL MEDICINE
Payer: COMMERCIAL

## 2025-04-28 LAB
CHOLEST SERPL-MCNC: 172 MG/DL (ref 100–199)
FASTING STATUS PATIENT QL REPORTED: NORMAL
HDLC SERPL-MCNC: 43 MG/DL
LDLC SERPL CALC-MCNC: 106 MG/DL
TRIGL SERPL-MCNC: 113 MG/DL (ref 0–149)

## 2025-04-28 PROCEDURE — 80061 LIPID PANEL: CPT

## 2025-04-28 PROCEDURE — 36415 COLL VENOUS BLD VENIPUNCTURE: CPT

## 2025-05-21 NOTE — PROGRESS NOTES
"Urogynecology & Reconstructive Pelvic Surgery    Corine Baugh MRN:[id number] :1990    Referred by: ***    Reason for Visit: No chief complaint on file.        Subjective     History of Presenting Illness:    Corine Baugh is a 34 y.o. P*** who presents for the evaluation and management of urinary incontinence.     She has leaks with cough, laugh, sneeze.  Myrbetriq did not help with her symptoms    She is already consulted with Dr. Ritchie for discussion of pelvic pain and endometriosis.  Her menstrual history is notable for heavy painful periods after the birth of her second child, s/p hysterectomy by Dr. Grover in , at which time she was told she has endometriosis and adenomyosis.  The pain continued, and she had a laparoscopic procedure with Dr. Cordon in , again noted to have stage IV endometriosis and advised to see colorectal surgery.She was referred to Dr. Field for stage IV endometriosis in     She has very irregular bowel movements with alternating constipation and diarrhea.    She was referred in     Prior Pelvic surgery:   2017: Laparoscopic-assisted vaginal hysterectomy, bilateral salpingectomy ([facility]).  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  No endometriosis excision performed per operative report ([facility]). \"There are adhesions of the omentum to the left side of the pelvis just adjacent to the left aspect of the vagina.  Also, there are adhesions of the omentum to the left side of the cul-de-sac as well.  The cul-de-sac other than for this adhesion is normal.  Both ovaries are normal and both ovarian   fossae are normal.  The appendix is seen and is normal. \"     Prior treatment:   ***Myrbetriq    Fluid intake:   ***    Pelvic floor symptom review:     Bladder:   Voids per day: *** Voids per night: ***      Urinary incontinence episodes per day: *** "    Urge leakage:  {none/on movement to bathroom/full bladder:15842}   Stress leakage: {none/with cough/with laugh/with excercise/..:37647}   Continuous / insensible urine loss: {YES/NO:}   Nocturnal enuresis: {YES/NO:}   Leakage volume: {drops/moderate/large...:21386}   Number of pads/day: ***    Bladder emptying: {complete/incomplete:42507}   Voiding symptoms: {none/hesitancy/post-void...:99274}   UTI in last 12 months: ***   Other urologic history: ***      Prolapse:     Prolapse symptoms: {none/bulge/exteriorized:65591}   Degree of prolapse: {to introitus/beyond introitus/..:49522}   Duration of prolapse symptoms: ***      Bowel:    Constipation: {YES/NO:}   Bowel movements per day: ***    Straining to empty bowels: {YES/NO:}   Splinting to evacuate: {YES/NO:}   Painful evacuation: {YES/NO:}   Difficulty emptying rectum: {YES/NO:}   Incontinence to stool: ***   Blood in stool: {YES/NO:}   Hemorrhoids: {YES/NO:}   Bowel conditions: {IBS/crohns/uc/celiac/cancer/..:07771}   Most recent colonoscopy: ***       Sexual function:    Sexually active: {YES/NO:}   Gender of partners: {male/female/trans-male/trans...:41493}   Pain with intercourse: {Yes/No:36949}       Pelvic Pain: ***      Past medical and surgical history    Past obstetric history   Number of vaginal deliveries: ***   Number of  deliveries: ***   History of vacuum/forceps: {YES/NO:}   History of obstetric anal sphincter injury: {YES/NO:}    Past gynecological history:    Last menstrual period: Patient's last menstrual period was 2011.   History of abnormal uterine bleeding: {YES/NO:}   History of fibroids: {YES/NO:}   History of endometrial polyps:  {YES/NO:}   History of endometriosis: {YES/NO:}   History of cervical dysplasia: {YES/NO:}   Last pap: ***   Current contraception: ***      Past medical history:  Past Medical History:   Diagnosis Date     "Anaphylactic shock due to shellfish 5/7/2020    Asthma     prn inhalers    Depression     anxiety bipolar depression    Endometriosis     Heart burn     Herpes genitalia     last breakout 12/2016    Migraine aura without headache     Seizure disorder (HCC)     Pt statets \"absent seizures\" last 2016     Past surgical history:  Past Surgical History:   Procedure Laterality Date    PELVISCOPY N/A 7/9/2018    Procedure: PELVISCOPY/ DIAGNOSTIC;  Surgeon: Francisco Cordon M.D.;  Location: SURGERY SAME DAY [facility];  Service: Gynecology    EXAM UNDER ANESTHESIA N/A 7/9/2018    Procedure: EXAM UNDER ANESTHESIA;  Surgeon: Francisco Cordon M.D.;  Location: SURGERY SAME DAY [facility];  Service: Gynecology    LAPAROSCOPIC LYSIS OF ADHESIONS Left 7/9/2018    Procedure: LAPAROSCOPIC LYSIS OF ADHESIONS;  Surgeon: Francisco Cordon M.D.;  Location: SURGERY SAME DAY [facility];  Service: Gynecology    VAGINAL HYSTERECTOMY SCOPE TOTAL Bilateral 4/21/2017    Procedure: VAGINAL HYSTERECTOMY SCOPE TOTAL W/BILATERAL SALPINGECTOMY;  Surgeon: Sonny Larry M.D.;  Location: SURGERY SAME DAY [facility];  Service:     CYSTOSCOPY  4/21/2017    Procedure: CYSTOSCOPY;  Surgeon: Sonny Larry M.D.;  Location: SURGERY SAME DAY [facility];  Service:     GYN SURGERY  2014    tubal    DENTAL EXTRACTION(S)       Medications:has a current medication list which includes the following prescription(s): acyclovir, butalbital/apap/caffeine, emgality, verapamil er, ketorolac, lamotrigine, albuterol, epinephrine, ondansetron, and syringe 3cc/00cu2-9/2\".  Allergies:Avocado, Banana, Bee, Betadine [povidone iodine], Clarithromycin, Clarithromycin, Demerol, Demerol, Eggplant, Iodine, Iodine, Lavandin oil, Macrobid [nitrofurantoin], Mushroom extract complex, Shellfish allergy, Avocado, Banana, Hydrocodone, Hydrocodone, Latex, Lavandin oil, Montelukast, Montelukast [singulair], Mushroom extract complex, Nitrofurantoin, Shellfish allergy, and " "Latex  Family history:  Family History   Problem Relation Age of Onset    Psychiatric Illness Mother     Alcohol/Drug Mother     Diabetes Father         type 1    Alcohol/Drug Father     Cancer Neg Hx      Social history: reports that she has never smoked. She has never used smokeless tobacco. She reports current alcohol use. She reports that she does not currently use drugs after having used the following drugs: Cocaine.    Review of systems: A full review of systems was performed, and negative with the exception of want is noted above in the HPI.        Objective        LMP 04/19/2011     Physical Exam    Genitourinary:    Vulva: {wnl/atrophic/lichen sclerosis/...:29802}   Bulbocavernosus reflex: {Intact/Absent:99077}   Anal wink reflex: {Intact/Absent:16044}   Perineal sensation: {wnl/decreased/asymmetrical/...:61081}   Urethra: {wnl/atrophic/caruncle/prolapse/...:32217}   Vagina: {wnl/atrophic/friable/stenotic/...:74632}   Atrophy: {Desc; none/mild/moderate/severe:787540::\"None\"}   Cough stress test: {not performed/positive/negative:21981}    Pelvic floor:    POP-Q: Aa *** / Ba *** / TVL *** / C *** / D *** / Ap *** / Bp *** / GH *** / PB ***   Prolapse stage: ***   Paravaginal defect: {left/right/bilateral:22705}   Cervical elongation: {YES/NO:20266}   Urethral tenderness: {YES/NO:20266}   Bladder/ suprapubic tenderness: {YES/NO:20266}   Levator tenderness: {none/left/right/bilateral:88683}   Levator muscle tone: {wnl/hypertonic/hypotonic:44260}   Pelvic floor contraction strength (modified Oxford scale): {0=none/1=flicker/2=weak/...:72315}   Pelvic floor contraction duration: {absent/brief/normal:42997}   Bimanual exam: {small, mobile uterus/ bulky uterus/...:79142}   Anal resting tone: {wnl/absent/decreased/...:52571}   Anal squeeze tone: {wnl/absent/decreased/...:07671}   Palpable anal sphincter defect: {YES/NO:20266}   Granulation tissue: {YES/NO:20266}   Epithelial erosions: {YES/NO:20266}   Epithelial " ulcerations: {YES/NO:20266}   Fistula: {none/apical/anterior/...:35172}   Vaginal band/stricture: {YES/NO:20266}    Procedure Performed: {no/bladder instillation/urethral......:09473}    Diagnostic test and records review:    Urine dipstick: ***     Post-void residual: *** mL, performed by {bladder scanner/cath....:41186}    Labs: ***    Surgical pathology 2017  A. Uterus, cervix, bilateral fallopian tubes:          Cervix: Benign cervix negative for dysplasia or malignancy.          Endometrium: Benign endometrium demonstrating mitotically           inactive tubular glands.           No hyperplasia or malignancy is seen.          Myometrium: No histopathologic abnormality other than focal very           superficial adenomyosis without atypia.          [place] detached fimbriated fallopian tube: Benign fallopian           tube demonstrating an adjacent small benign paratubal cyst.          Longer detached fimbriated fallopian tube: Benign fallopian tube           negative for malignancy.     Radiology: ***    Procedural: ***    Documentation reviewed: {prior emr records/flowsheet/...:39130}    Outside records reviewed: *** pages           Assessment & Plan     Corine Baugh is a 34 y.o. P*** with ***. We discussed my recommendations for further diagnosis and treatment at length today.     There are no diagnoses linked to this encounter.               Tawny Blanchard MD, FACOG  Urogynecology and Reconstructive Pelvic Surgery  Department of Obstetrics and Gynecology  [facility]        This medical record contains text that has been entered with the assistance of computer voice recognition and dictation software.  Therefore, it may contain unintended errors in text, spelling, punctuation, or grammar

## 2025-05-22 ENCOUNTER — APPOINTMENT (OUTPATIENT)
Dept: GYNECOLOGY | Facility: CLINIC | Age: 35
End: 2025-05-22
Payer: COMMERCIAL

## 2025-06-03 ENCOUNTER — HOSPITAL ENCOUNTER (OUTPATIENT)
Dept: LAB | Facility: MEDICAL CENTER | Age: 35
End: 2025-06-03
Attending: NURSE PRACTITIONER
Payer: COMMERCIAL

## 2025-06-03 LAB
EST. AVERAGE GLUCOSE BLD GHB EST-MCNC: 114 MG/DL
HBA1C MFR BLD: 5.6 % (ref 4–5.6)

## 2025-06-03 PROCEDURE — 36415 COLL VENOUS BLD VENIPUNCTURE: CPT

## 2025-06-03 PROCEDURE — 83036 HEMOGLOBIN GLYCOSYLATED A1C: CPT

## 2025-07-18 ENCOUNTER — APPOINTMENT (OUTPATIENT)
Dept: GYNECOLOGY | Facility: CLINIC | Age: 35
End: 2025-07-18
Payer: COMMERCIAL

## 2025-07-24 ENCOUNTER — HOSPITAL ENCOUNTER (OUTPATIENT)
Dept: LAB | Facility: MEDICAL CENTER | Age: 35
End: 2025-07-24
Attending: NURSE PRACTITIONER
Payer: COMMERCIAL

## 2025-07-24 PROCEDURE — 86341 ISLET CELL ANTIBODY: CPT

## 2025-07-24 PROCEDURE — 36415 COLL VENOUS BLD VENIPUNCTURE: CPT

## 2025-07-24 PROCEDURE — 86337 INSULIN ANTIBODIES: CPT

## 2025-07-24 PROCEDURE — 84681 ASSAY OF C-PEPTIDE: CPT

## 2025-07-26 LAB — C PEPTIDE SERPL-MCNC: 4.5 NG/ML (ref 0.5–3.3)

## 2025-07-27 LAB — GAD65 AB SER IA-ACNC: <5 IU/ML (ref 0–5)

## 2025-07-28 LAB — INSULIN HUMAN AB SER-ACNC: <0.4 U/ML (ref 0–0.4)

## 2025-08-06 ENCOUNTER — HOSPITAL ENCOUNTER (OUTPATIENT)
Dept: LAB | Facility: MEDICAL CENTER | Age: 35
End: 2025-08-06
Attending: INTERNAL MEDICINE
Payer: COMMERCIAL

## 2025-08-06 LAB
CHOLEST SERPL-MCNC: 155 MG/DL (ref 100–199)
HDLC SERPL-MCNC: 38 MG/DL
LDLC SERPL CALC-MCNC: 101 MG/DL
TRIGL SERPL-MCNC: 80 MG/DL (ref 0–149)

## 2025-08-06 PROCEDURE — 80061 LIPID PANEL: CPT

## 2025-08-06 PROCEDURE — 36415 COLL VENOUS BLD VENIPUNCTURE: CPT

## (undated) DEVICE — PACK LAPAROSCOPY - (1/CA)

## (undated) DEVICE — KIT ANESTHESIA W/CIRCUIT & 3/LT BAG W/FILTER (20EA/CA)

## (undated) DEVICE — SUTURE 0 VICRYL PLUS UR-6 - 27 INCH (36/BX)

## (undated) DEVICE — UTERINE MANIP RUMI 6.7X8 - (5/BX)

## (undated) DEVICE — UTERINE MANIP RUMI 6.7X6 - (5/BX)

## (undated) DEVICE — GLOVE BIOGEL PI INDICATOR SZ 6.5 SURGICAL PF LF - (50/BX 4BX/CA)

## (undated) DEVICE — SUTURE GENERAL

## (undated) DEVICE — SPONGE RADIOPAQUE CTN X-LG - STERILE (50PK/CA) MADE TO ORDER ITEM AND HAS A 4-6 WEEK LEAD TIME

## (undated) DEVICE — PAD BABY LAP 4X18 W/O - RINGS PREWASHED 5/PK 40PK/CS

## (undated) DEVICE — SODIUM CHL IRRIGATION 0.9% 1000ML (12EA/CA)

## (undated) DEVICE — TUBE CONNECTING SUCTION - CLEAR PLASTIC STERILE 72 IN (50EA/CA)

## (undated) DEVICE — CATHETER IV 20 GA X 1-1/4 ---SURG.& SDS ONLY--- (50EA/BX)

## (undated) DEVICE — GLOVE BIOGEL PI INDICATOR SZ 7.5 SURGICAL PF LF -(50/BX 4BX/CA)

## (undated) DEVICE — MASK ANESTHESIA ADULT  - (100/CA)

## (undated) DEVICE — TROCAR STEP 11MM - (3/CA)

## (undated) DEVICE — BANDAID SHEER STRIP 3/4 IN (100EA/BX 12BX/CA)

## (undated) DEVICE — GLOVE BIOGEL SZ 7.5 SURGICAL PF LTX - (50PR/BX 4BX/CA)

## (undated) DEVICE — HEAD HOLDER JUNIOR/ADULT

## (undated) DEVICE — SLEEVE, VASO, THIGH, MED

## (undated) DEVICE — KIT  I.V. START (100EA/CA)

## (undated) DEVICE — SUTURE 1 6-18IN COATED VICRYL - PLUS VIO TIES(12PK/BX)

## (undated) DEVICE — LACTATED RINGERS INJ 1000 ML - (14EA/CA 60CA/PF)

## (undated) DEVICE — CANISTER SUCTION 3000ML MECHANICAL FILTER AUTO SHUTOFF MEDI-VAC NONSTERILE LF DISP  (40EA/CA)

## (undated) DEVICE — ELECTRODE DUAL RETURN W/ CORD - (50/PK)

## (undated) DEVICE — DRAPE VAGINAL BIB W/ POUCH (10EA/CA)

## (undated) DEVICE — SUTURE 4-0 MONOCRYL PLUS PS-2 - 27 INCH (36/BX)

## (undated) DEVICE — NEEDLE INSUFFLATION FOR STEP - (12/BX)

## (undated) DEVICE — TRAY SRGPRP PVP IOD WT PRP - (20/CA)

## (undated) DEVICE — LIGASURE 5MM BLUNT TIP LONG - 44CM (6EA/PK)

## (undated) DEVICE — TUBING CLEARLINK DUO-VENT - C-FLO (48EA/CA)

## (undated) DEVICE — SUTURE 4-0 VICRYL PLUS FS-2 - 27 INCH (36/BX)

## (undated) DEVICE — TUBE E-T HI-LO CUFF 7.0MM (10EA/PK)

## (undated) DEVICE — BANDAID X-LARGE 2 X 4 IN LF (50EA/BX)

## (undated) DEVICE — PACK MINOR BASIN - (2EA/CA)

## (undated) DEVICE — LEAD SET 6 DISP. EKG NIHON KOHDEN (100EA/CA)

## (undated) DEVICE — WATER IRRIGATION STERILE 1000ML (12EA/CA)

## (undated) DEVICE — SUCTION INSTRUMENT YANKAUER BULBOUS TIP W/O VENT (50EA/CA)

## (undated) DEVICE — SUTURE 0 VICRYL PLUS CT-1 - 36 INCH (36/BX)

## (undated) DEVICE — GOWN SURGEONS LARGE - (32/CA)

## (undated) DEVICE — GOWN WARMING STANDARD FLEX - (30/CA)

## (undated) DEVICE — GLOVE SZ 6.5 BIOGEL PI MICRO - PF LF (50PR/BX)

## (undated) DEVICE — ARMREST CRADLE FOAM - (2PR/PK 12PR/CA)

## (undated) DEVICE — TUBING SETDISPOS HIGH FLOW II - (10/BX)

## (undated) DEVICE — GLOVE BIOGEL SZ 8.5 SURGICAL PF LTX - (50PR/BX 4BX/CA)

## (undated) DEVICE — PAD SANITARY 11IN MAXI IND WRAPPED  (12EA/PK 24PK/CA)

## (undated) DEVICE — SYRINGE DISP. 12 CC LL - (100/BX)

## (undated) DEVICE — TRAY FOLEY CATHETER STATLOCK 16FR SURESTEP  (10EA/CA)

## (undated) DEVICE — NEPTUNE 4 PORT MANIFOLD - (20/PK)

## (undated) DEVICE — SUTURE 0 VICRYL PLUS CT-1 - 8 X 18 INCH (12/BX)

## (undated) DEVICE — GUIDE TRACHE TUBE INTUBATING STYLET 5.0-10.0MM 14FR (20EA/PK)

## (undated) DEVICE — GOWN SURGEONS X-LARGE - DISP. (30/CA)

## (undated) DEVICE — SUTURE 2-0 VICRYL PLUS CT-2 - 27 INCH (36/BX)

## (undated) DEVICE — BLADE SURGICAL #10 - (50/BX)

## (undated) DEVICE — TROCAR STEP 5MM - (3/CA)

## (undated) DEVICE — SET LEADWIRE 5 LEAD BEDSIDE DISPOSABLE ECG (1SET OF 5/EA)

## (undated) DEVICE — EVICEL 5ML - (1/BX)REFRIGERATE UPON RECEIPT

## (undated) DEVICE — SENSOR SPO2 NEO LNCS ADHESIVE (20/BX) SEE USER NOTES

## (undated) DEVICE — CANISTER SUCTION RIGID RED 1500CC (40EA/CA)

## (undated) DEVICE — GLOVE SZ 7 BIOGEL PI MICRO - PF LF (50PR/BX 4BX/CA)

## (undated) DEVICE — ELECTRODE 850 FOAM ADHESIVE - HYDROGEL RADIOTRNSPRNT (50/PK)

## (undated) DEVICE — GLOVE SZ 7.5 BIOGEL PI MICRO - PF LF (50PR/BX)

## (undated) DEVICE — SET IRRIGATION CYSTOSCOPY TUBE L80 IN (20EA/CA)

## (undated) DEVICE — PROTECTOR ULNA NERVE - (36PR/CA)

## (undated) DEVICE — ADHESIVE DERMABOND HVD MINI (12EA/BX)

## (undated) DEVICE — SET EXTENSION WITH 2 PORTS (48EA/CA) ***PART #2C8610 IS A SUBSTITUTE*****

## (undated) DEVICE — SYRINGE SAFETY 3 ML 18 GA X 1 1/2 BLUNT LL (100/BX 8BX/CA)

## (undated) DEVICE — GELPORT 120MM - (1/EA)

## (undated) DEVICE — APPLICATOR EVICEL TIP   35CM - (3/BX)

## (undated) DEVICE — SOLUTION D-5-W INJ. 1000 ML (14EA/CA)

## (undated) DEVICE — GLOVE BIOGEL INDICATOR SZ 8.5 SURGICAL PF LTX - (50/BX 4BX/CA)

## (undated) DEVICE — SET SUCTION/IRRIGATION WITH DISPOSABLE TIP (6/CA )PART #0250-070-520 IS A SUB